# Patient Record
Sex: MALE | Race: BLACK OR AFRICAN AMERICAN | Employment: UNEMPLOYED | URBAN - METROPOLITAN AREA
[De-identification: names, ages, dates, MRNs, and addresses within clinical notes are randomized per-mention and may not be internally consistent; named-entity substitution may affect disease eponyms.]

---

## 2020-04-26 ENCOUNTER — APPOINTMENT (OUTPATIENT)
Dept: GENERAL RADIOLOGY | Age: 45
DRG: 053 | End: 2020-04-26
Payer: MEDICAID

## 2020-04-26 ENCOUNTER — APPOINTMENT (OUTPATIENT)
Dept: CT IMAGING | Age: 45
DRG: 053 | End: 2020-04-26
Payer: MEDICAID

## 2020-04-26 ENCOUNTER — HOSPITAL ENCOUNTER (INPATIENT)
Age: 45
LOS: 2 days | Discharge: HOME | DRG: 053 | End: 2020-04-28
Attending: EMERGENCY MEDICINE | Admitting: FAMILY MEDICINE
Payer: MEDICAID

## 2020-04-26 PROBLEM — R56.9 SEIZURE-LIKE ACTIVITY (HCC): Status: ACTIVE | Noted: 2020-04-26

## 2020-04-26 LAB
A/G RATIO: 1.1 (ref 1.1–2.2)
ACETAMINOPHEN LEVEL: <5 UG/ML (ref 10–30)
ALBUMIN SERPL-MCNC: 4.7 G/DL (ref 3.4–5)
ALP BLD-CCNC: 84 U/L (ref 40–129)
ALT SERPL-CCNC: 80 U/L (ref 10–40)
AMPHETAMINE SCREEN, URINE: NORMAL
ANION GAP SERPL CALCULATED.3IONS-SCNC: 33 MMOL/L (ref 3–16)
AST SERPL-CCNC: 104 U/L (ref 15–37)
BARBITURATE SCREEN URINE: NORMAL
BASE EXCESS VENOUS: -2.9 MMOL/L (ref -3–3)
BASOPHILS ABSOLUTE: 0.1 K/UL (ref 0–0.2)
BASOPHILS RELATIVE PERCENT: 1.7 %
BENZODIAZEPINE SCREEN, URINE: NORMAL
BETA-HYDROXYBUTYRATE: 0.1 MMOL/L (ref 0–0.27)
BILIRUB SERPL-MCNC: 0.5 MG/DL (ref 0–1)
BILIRUBIN URINE: NEGATIVE
BLOOD, URINE: ABNORMAL
BUN BLDV-MCNC: 12 MG/DL (ref 7–20)
CALCIUM SERPL-MCNC: 9.3 MG/DL (ref 8.3–10.6)
CANNABINOID SCREEN URINE: NORMAL
CARBOXYHEMOGLOBIN: 2.5 % (ref 0–1.5)
CHLORIDE BLD-SCNC: 89 MMOL/L (ref 99–110)
CLARITY: CLEAR
CO2: 12 MMOL/L (ref 21–32)
COCAINE METABOLITE SCREEN URINE: NORMAL
COLOR: YELLOW
CREAT SERPL-MCNC: 1 MG/DL (ref 0.9–1.3)
EOSINOPHILS ABSOLUTE: 0.1 K/UL (ref 0–0.6)
EOSINOPHILS RELATIVE PERCENT: 1 %
EPITHELIAL CELLS, UA: 0 /HPF (ref 0–5)
ETHANOL: NORMAL MG/DL (ref 0–0.08)
GFR AFRICAN AMERICAN: >60
GFR NON-AFRICAN AMERICAN: >60
GLOBULIN: 4.4 G/DL
GLUCOSE BLD-MCNC: 244 MG/DL (ref 70–99)
GLUCOSE BLD-MCNC: 77 MG/DL (ref 70–99)
GLUCOSE BLD-MCNC: 79 MG/DL (ref 70–99)
GLUCOSE URINE: 250 MG/DL
HCO3 VENOUS: 24.3 MMOL/L (ref 23–29)
HCT VFR BLD CALC: 43.1 % (ref 40.5–52.5)
HEMOGLOBIN, VEN, REDUCED: 13 %
HEMOGLOBIN: 14.3 G/DL (ref 13.5–17.5)
HYALINE CASTS: 1 /LPF (ref 0–8)
INR BLD: 1 (ref 0.86–1.14)
KETONES, URINE: NEGATIVE MG/DL
LACTIC ACID: 3.3 MMOL/L (ref 0.4–2)
LEUKOCYTE ESTERASE, URINE: NEGATIVE
LIPASE: 30 U/L (ref 13–60)
LYMPHOCYTES ABSOLUTE: 1.8 K/UL (ref 1–5.1)
LYMPHOCYTES RELATIVE PERCENT: 30.7 %
Lab: NORMAL
MCH RBC QN AUTO: 34.1 PG (ref 26–34)
MCHC RBC AUTO-ENTMCNC: 33.1 G/DL (ref 31–36)
MCV RBC AUTO: 103 FL (ref 80–100)
METHADONE SCREEN, URINE: NORMAL
METHEMOGLOBIN VENOUS: 0.5 %
MICROSCOPIC EXAMINATION: YES
MONOCYTES ABSOLUTE: 0.6 K/UL (ref 0–1.3)
MONOCYTES RELATIVE PERCENT: 10.2 %
NEUTROPHILS ABSOLUTE: 3.2 K/UL (ref 1.7–7.7)
NEUTROPHILS RELATIVE PERCENT: 56.4 %
NITRITE, URINE: NEGATIVE
O2 CONTENT, VEN: 16 VOL %
O2 SAT, VEN: 87 %
O2 THERAPY: ABNORMAL
OPIATE SCREEN URINE: NORMAL
OXYCODONE URINE: NORMAL
PCO2, VEN: 50.8 MMHG (ref 40–50)
PDW BLD-RTO: 12.9 % (ref 12.4–15.4)
PERFORMED ON: NORMAL
PERFORMED ON: NORMAL
PH UA: 6
PH UA: 6 (ref 5–8)
PH VENOUS: 7.29 (ref 7.35–7.45)
PHENCYCLIDINE SCREEN URINE: NORMAL
PLATELET # BLD: 149 K/UL (ref 135–450)
PMV BLD AUTO: 7.8 FL (ref 5–10.5)
PO2, VEN: 59.9 MMHG (ref 25–40)
POTASSIUM REFLEX MAGNESIUM: 3.6 MMOL/L (ref 3.5–5.1)
PROPOXYPHENE SCREEN: NORMAL
PROTEIN UA: 100 MG/DL
PROTHROMBIN TIME: 11.6 SEC (ref 10–13.2)
RBC # BLD: 4.18 M/UL (ref 4.2–5.9)
RBC UA: 1 /HPF (ref 0–4)
SALICYLATE, SERUM: <0.3 MG/DL (ref 15–30)
SODIUM BLD-SCNC: 134 MMOL/L (ref 136–145)
SPECIFIC GRAVITY UA: 1.02 (ref 1–1.03)
TCO2 CALC VENOUS: 58 MMOL/L
TOTAL PROTEIN: 9.1 G/DL (ref 6.4–8.2)
TROPONIN: <0.01 NG/ML
URINE REFLEX TO CULTURE: ABNORMAL
URINE TYPE: ABNORMAL
UROBILINOGEN, URINE: 0.2 E.U./DL
WBC # BLD: 5.7 K/UL (ref 4–11)
WBC UA: 0 /HPF (ref 0–5)

## 2020-04-26 PROCEDURE — G0480 DRUG TEST DEF 1-7 CLASSES: HCPCS

## 2020-04-26 PROCEDURE — 2100000000 HC CCU R&B

## 2020-04-26 PROCEDURE — 96365 THER/PROPH/DIAG IV INF INIT: CPT

## 2020-04-26 PROCEDURE — 83036 HEMOGLOBIN GLYCOSYLATED A1C: CPT

## 2020-04-26 PROCEDURE — 80053 COMPREHEN METABOLIC PANEL: CPT

## 2020-04-26 PROCEDURE — 82803 BLOOD GASES ANY COMBINATION: CPT

## 2020-04-26 PROCEDURE — 80307 DRUG TEST PRSMV CHEM ANLYZR: CPT

## 2020-04-26 PROCEDURE — 6360000002 HC RX W HCPCS: Performed by: FAMILY MEDICINE

## 2020-04-26 PROCEDURE — 93005 ELECTROCARDIOGRAM TRACING: CPT

## 2020-04-26 PROCEDURE — 99285 EMERGENCY DEPT VISIT HI MDM: CPT

## 2020-04-26 PROCEDURE — 85610 PROTHROMBIN TIME: CPT

## 2020-04-26 PROCEDURE — 6370000000 HC RX 637 (ALT 250 FOR IP): Performed by: FAMILY MEDICINE

## 2020-04-26 PROCEDURE — 6360000002 HC RX W HCPCS: Performed by: EMERGENCY MEDICINE

## 2020-04-26 PROCEDURE — 71045 X-RAY EXAM CHEST 1 VIEW: CPT

## 2020-04-26 PROCEDURE — 85025 COMPLETE CBC W/AUTO DIFF WBC: CPT

## 2020-04-26 PROCEDURE — 2500000003 HC RX 250 WO HCPCS: Performed by: EMERGENCY MEDICINE

## 2020-04-26 PROCEDURE — 83605 ASSAY OF LACTIC ACID: CPT

## 2020-04-26 PROCEDURE — 83690 ASSAY OF LIPASE: CPT

## 2020-04-26 PROCEDURE — 81001 URINALYSIS AUTO W/SCOPE: CPT

## 2020-04-26 PROCEDURE — 96375 TX/PRO/DX INJ NEW DRUG ADDON: CPT

## 2020-04-26 PROCEDURE — 74176 CT ABD & PELVIS W/O CONTRAST: CPT

## 2020-04-26 PROCEDURE — 84484 ASSAY OF TROPONIN QUANT: CPT

## 2020-04-26 PROCEDURE — 82010 KETONE BODYS QUAN: CPT

## 2020-04-26 PROCEDURE — 70450 CT HEAD/BRAIN W/O DYE: CPT

## 2020-04-26 PROCEDURE — 36415 COLL VENOUS BLD VENIPUNCTURE: CPT

## 2020-04-26 PROCEDURE — 2580000003 HC RX 258: Performed by: FAMILY MEDICINE

## 2020-04-26 PROCEDURE — 2580000003 HC RX 258: Performed by: EMERGENCY MEDICINE

## 2020-04-26 RX ORDER — ONDANSETRON 2 MG/ML
INJECTION INTRAMUSCULAR; INTRAVENOUS
Status: DISPENSED
Start: 2020-04-26 | End: 2020-04-27

## 2020-04-26 RX ORDER — INSULIN LISPRO 100 [IU]/ML
0-12 INJECTION, SOLUTION INTRAVENOUS; SUBCUTANEOUS
Status: DISCONTINUED | OUTPATIENT
Start: 2020-04-27 | End: 2020-04-28 | Stop reason: HOSPADM

## 2020-04-26 RX ORDER — LEVETIRACETAM 5 MG/ML
500 INJECTION INTRAVASCULAR EVERY 12 HOURS
Status: DISCONTINUED | OUTPATIENT
Start: 2020-04-26 | End: 2020-04-28 | Stop reason: HOSPADM

## 2020-04-26 RX ORDER — LORAZEPAM 2 MG/ML
1 INJECTION INTRAMUSCULAR ONCE
Status: COMPLETED | OUTPATIENT
Start: 2020-04-26 | End: 2020-04-26

## 2020-04-26 RX ORDER — ONDANSETRON 2 MG/ML
4 INJECTION INTRAMUSCULAR; INTRAVENOUS ONCE
Status: COMPLETED | OUTPATIENT
Start: 2020-04-26 | End: 2020-04-26

## 2020-04-26 RX ORDER — NICOTINE POLACRILEX 4 MG
15 LOZENGE BUCCAL PRN
Status: DISCONTINUED | OUTPATIENT
Start: 2020-04-26 | End: 2020-04-28 | Stop reason: HOSPADM

## 2020-04-26 RX ORDER — INSULIN LISPRO 100 [IU]/ML
0-6 INJECTION, SOLUTION INTRAVENOUS; SUBCUTANEOUS NIGHTLY
Status: DISCONTINUED | OUTPATIENT
Start: 2020-04-26 | End: 2020-04-28 | Stop reason: HOSPADM

## 2020-04-26 RX ORDER — DEXTROSE MONOHYDRATE 25 G/50ML
12.5 INJECTION, SOLUTION INTRAVENOUS PRN
Status: DISCONTINUED | OUTPATIENT
Start: 2020-04-26 | End: 2020-04-28 | Stop reason: HOSPADM

## 2020-04-26 RX ORDER — POLYETHYLENE GLYCOL 3350 17 G/17G
17 POWDER, FOR SOLUTION ORAL DAILY PRN
Status: DISCONTINUED | OUTPATIENT
Start: 2020-04-26 | End: 2020-04-28 | Stop reason: HOSPADM

## 2020-04-26 RX ORDER — SODIUM CHLORIDE 0.9 % (FLUSH) 0.9 %
10 SYRINGE (ML) INJECTION PRN
Status: DISCONTINUED | OUTPATIENT
Start: 2020-04-26 | End: 2020-04-28 | Stop reason: HOSPADM

## 2020-04-26 RX ORDER — 0.9 % SODIUM CHLORIDE 0.9 %
1000 INTRAVENOUS SOLUTION INTRAVENOUS ONCE
Status: COMPLETED | OUTPATIENT
Start: 2020-04-26 | End: 2020-04-26

## 2020-04-26 RX ORDER — AMLODIPINE BESYLATE 5 MG/1
10 TABLET ORAL NIGHTLY
Status: DISCONTINUED | OUTPATIENT
Start: 2020-04-26 | End: 2020-04-28 | Stop reason: HOSPADM

## 2020-04-26 RX ORDER — PROMETHAZINE HYDROCHLORIDE 25 MG/1
12.5 TABLET ORAL EVERY 6 HOURS PRN
Status: DISCONTINUED | OUTPATIENT
Start: 2020-04-26 | End: 2020-04-28 | Stop reason: HOSPADM

## 2020-04-26 RX ORDER — DEXTROSE MONOHYDRATE 50 MG/ML
100 INJECTION, SOLUTION INTRAVENOUS PRN
Status: DISCONTINUED | OUTPATIENT
Start: 2020-04-26 | End: 2020-04-28 | Stop reason: HOSPADM

## 2020-04-26 RX ORDER — HYDRALAZINE HYDROCHLORIDE 20 MG/ML
10 INJECTION INTRAMUSCULAR; INTRAVENOUS EVERY 4 HOURS PRN
Status: DISCONTINUED | OUTPATIENT
Start: 2020-04-26 | End: 2020-04-28 | Stop reason: HOSPADM

## 2020-04-26 RX ORDER — ACETAMINOPHEN 325 MG/1
650 TABLET ORAL EVERY 6 HOURS PRN
Status: DISCONTINUED | OUTPATIENT
Start: 2020-04-26 | End: 2020-04-28 | Stop reason: HOSPADM

## 2020-04-26 RX ORDER — LORAZEPAM 2 MG/ML
2 INJECTION INTRAMUSCULAR
Status: DISCONTINUED | OUTPATIENT
Start: 2020-04-26 | End: 2020-04-28 | Stop reason: HOSPADM

## 2020-04-26 RX ORDER — ONDANSETRON 2 MG/ML
4 INJECTION INTRAMUSCULAR; INTRAVENOUS EVERY 6 HOURS PRN
Status: DISCONTINUED | OUTPATIENT
Start: 2020-04-26 | End: 2020-04-28 | Stop reason: HOSPADM

## 2020-04-26 RX ORDER — ACETAMINOPHEN 650 MG/1
650 SUPPOSITORY RECTAL EVERY 6 HOURS PRN
Status: DISCONTINUED | OUTPATIENT
Start: 2020-04-26 | End: 2020-04-28 | Stop reason: HOSPADM

## 2020-04-26 RX ORDER — LORAZEPAM 2 MG/ML
3 INJECTION INTRAMUSCULAR
Status: DISCONTINUED | OUTPATIENT
Start: 2020-04-26 | End: 2020-04-28 | Stop reason: HOSPADM

## 2020-04-26 RX ORDER — LORAZEPAM 1 MG/1
4 TABLET ORAL
Status: DISCONTINUED | OUTPATIENT
Start: 2020-04-26 | End: 2020-04-28 | Stop reason: HOSPADM

## 2020-04-26 RX ORDER — SODIUM CHLORIDE 0.9 % (FLUSH) 0.9 %
10 SYRINGE (ML) INJECTION EVERY 12 HOURS SCHEDULED
Status: DISCONTINUED | OUTPATIENT
Start: 2020-04-26 | End: 2020-04-28 | Stop reason: HOSPADM

## 2020-04-26 RX ORDER — LORAZEPAM 2 MG/ML
4 INJECTION INTRAMUSCULAR
Status: DISCONTINUED | OUTPATIENT
Start: 2020-04-26 | End: 2020-04-28 | Stop reason: HOSPADM

## 2020-04-26 RX ORDER — LORAZEPAM 1 MG/1
3 TABLET ORAL
Status: DISCONTINUED | OUTPATIENT
Start: 2020-04-26 | End: 2020-04-28 | Stop reason: HOSPADM

## 2020-04-26 RX ORDER — LORAZEPAM 2 MG/ML
1 INJECTION INTRAMUSCULAR
Status: DISCONTINUED | OUTPATIENT
Start: 2020-04-26 | End: 2020-04-28 | Stop reason: HOSPADM

## 2020-04-26 RX ORDER — LORAZEPAM 1 MG/1
1 TABLET ORAL
Status: DISCONTINUED | OUTPATIENT
Start: 2020-04-26 | End: 2020-04-28 | Stop reason: HOSPADM

## 2020-04-26 RX ORDER — LORAZEPAM 1 MG/1
2 TABLET ORAL
Status: DISCONTINUED | OUTPATIENT
Start: 2020-04-26 | End: 2020-04-28 | Stop reason: HOSPADM

## 2020-04-26 RX ADMIN — FOLIC ACID 1000 ML/HR: 5 INJECTION, SOLUTION INTRAMUSCULAR; INTRAVENOUS; SUBCUTANEOUS at 17:07

## 2020-04-26 RX ADMIN — Medication 10 ML: at 21:50

## 2020-04-26 RX ADMIN — AMLODIPINE BESYLATE 10 MG: 5 TABLET ORAL at 21:20

## 2020-04-26 RX ADMIN — LEVETIRACETAM 500 MG: 5 INJECTION INTRAVENOUS at 21:21

## 2020-04-26 RX ADMIN — SODIUM CHLORIDE 1000 ML: 9 INJECTION, SOLUTION INTRAVENOUS at 15:37

## 2020-04-26 RX ADMIN — ENOXAPARIN SODIUM 40 MG: 40 INJECTION SUBCUTANEOUS at 21:44

## 2020-04-26 RX ADMIN — LORAZEPAM 1 MG: 2 INJECTION INTRAMUSCULAR; INTRAVENOUS at 16:18

## 2020-04-26 RX ADMIN — ONDANSETRON 4 MG: 2 INJECTION INTRAMUSCULAR; INTRAVENOUS at 16:17

## 2020-04-26 ASSESSMENT — PAIN SCALES - GENERAL
PAINLEVEL_OUTOF10: 0
PAINLEVEL_OUTOF10: 0

## 2020-04-26 NOTE — ED PROVIDER NOTES
[facility] Emergency Department      Pt Name: Lois Bruner  MRN: [id number]  [place] 1975  Date of evaluation: 4/26/2020  Provider: Westley Gallardo MD  CHIEF COMPLAINT  Chief Complaint   Patient presents with    Seizures     Pt brought in per [address] [place] [address], per report family reports possible seizure, pt was shaking and not responsive for short period of time. Pt arrives alert and oriented to person and place only. HPI  Lois Bruner is a 38 y.o. male who presents because of a seizure. A friend of his called EMS because he was unresponsive and exhibited shaking at home. Is unclear how long it occurred. The patient was confused on EMS arrival.  He reports not ever having a seizure before. He does occasionally use alcohol. He denies drug use. He is initially a very poor historian and confused. He admits to abdominal pain. REVIEW OF SYSTEMS:  No fever, no new medication, no tongue injury, no chest pain, incontinence negative Pertinent positives and negatives as per the HPI. All other review of systems reviewed and negative. Nursing notes reviewed. PAST MEDICAL HISTORY  History reviewed. No pertinent past medical history. SURGICAL HISTORY  History reviewed. No pertinent surgical history. MEDICATIONS:  No current facility-administered medications on file prior to encounter. No current outpatient medications on file prior to encounter. ALLERGIES  Patient has no known allergies. FAMILY HISTORY:  History reviewed. No pertinent family history. SOCIAL HISTORY  Social History     Tobacco Use    Smoking status: Current Every Day Smoker    Smokeless tobacco: Never Used   Substance Use Topics    Alcohol use: Yes     Comment: socially    Drug use: Never     IMMUNIZATIONS  Noncontributory    PHYSICAL EXAM  VITAL SIGNS:  Blood pressure (!) 152/92, pulse 97, temperature 99 °F (37.2 °C), temperature source Oral, resp. rate 23, SpO2 96 %.   Constitutional:  38 y.o. male alert, cooperative but appears confused  HENT:  Atraumatic, mucous membranes moist, tongue without bruising  Eyes:   Conjunctiva clear, no icterus  Neck:  Supple, no adenopathy, no JVD, no meningeal signs  Cardiovascular:  Regular, no rubs, no discernible murmur  Thorax & Lungs:  No accessory muscle usage, clear  Abdomen:  Soft, bowel sounds present, no tenderness  Back:  No deformity  Genitalia:  Deferred  Rectal:  Deferred  Extremities:  No cyanosis, no edema  Skin:  Warm, dry  Neurologic:  Alert & oriented, no slurred speech, CN function intact, PERRL, coordination of extremities symmetric, tremor of both hands and tongue  Psychiatric:  Affect appropriate    DIAGNOSTIC RESULTS:  Labs resulted at the time of this note reviewed.   Labs Reviewed   CBC WITH AUTO DIFFERENTIAL - Abnormal; Notable for the following components:       Result Value    RBC 4.18 (*)     .0 (*)     MCH 34.1 (*)     All other components within normal limits    Narrative:     Performed at:  [facility] [address], [place], [address] [facility]   Phone [phone]   COMPREHENSIVE METABOLIC PANEL W/ REFLEX TO MG FOR LOW K - Abnormal; Notable for the following components:    Sodium 134 (*)     Chloride 89 (*)     CO2 12 (*)     Anion Gap 33 (*)     Glucose 244 (*)     Total Protein 9.1 (*)     ALT 80 (*)      (*)     All other components within normal limits    Narrative:     Farzaneh Friedman  [facility] tel. [phone],  Chemistry results called to and read back by Narendra Bowden, 04/26/2020  16:19, by [facility]  Performed at:  [facility] [address], [place], [address]   Phone [phone]   URINE RT REFLEX TO CULTURE - Abnormal; Notable for the following components:    Glucose, Ur 250 (*)     Blood, Urine TRACE (*)     Protein,  (*)     All other components within normal limits    Narrative:     Performed at:  [facility] [address], [place], [place] [address]   Phone [phone]   BLOOD GAS, VENOUS - Abnormal; Notable for the following components:    pH, Yoan 7.288 (*)     pCO2, Yoan 50.8 (*)     pO2, Yoan 59.9 (*)     Carboxyhemoglobin 2.5 (*)     All other components within normal limits    Narrative:     Performed at:  [facility] [address] [place], [address]   Phone [phone]   ACETAMINOPHEN LEVEL - Abnormal; Notable for the following components:    Acetaminophen Level <5 (*)     All other components within normal limits    Narrative:     Performed at:  [facility] [address] [facility] [facility] [address]  [place], [address] [facility]   Phone [phone]   SALICYLATE LEVEL - Abnormal; Notable for the following components:    Salicylate, Serum <0.1 (*)     All other components within normal limits    Narrative:     Performed at:  [facility] [address] [place],  [place], [address]   Phone [phone]   LACTIC ACID, PLASMA - Abnormal; Notable for the following components:    Lactic Acid 3.3 (*)     All other components within normal limits    Narrative:     Performed at:  [facility] [address] [place],  [place], [address] [employer]   Phone [phone]   TROPONIN    Narrative:     Roxanna Davila  [facility] tel. [phone],  Chemistry results called to and read back by Osman Huang, 04/26/2020  16:19, by [facility]  Performed at:  [facility] [address] [place],  [place], [address] [employer]   Phone [phone]   PROTIME-INR    Narrative:     Performed at:  [facility] [address] [place],  [place], [address]   Phone [phone]   URINE DRUG SCREEN    Narrative:     Performed at:  [facility] [address] [facility] [place], [address] [facility]   Phone [phone]   ETHANOL    Narrative:     oRxanna Davila  [facility] tel. [phone],  Chemistry results called to and read back by Osman Huang,

## 2020-04-26 NOTE — ED NOTES
Dr. Rima Valentino notified of CO2 12. Pt alert and oriented times 4. Confused at times, but easily redirectable. No seizure activity noted.       Charly Phillips RN  04/26/20 3022

## 2020-04-26 NOTE — ED NOTES
Large amount of undigested emesis at this time.  Dr. Sheela Mosqueda notified     Niki Dubon RN  04/26/20 1615

## 2020-04-27 ENCOUNTER — APPOINTMENT (OUTPATIENT)
Dept: MRI IMAGING | Age: 45
DRG: 053 | End: 2020-04-27
Payer: MEDICAID

## 2020-04-27 LAB
A/G RATIO: 1.1 (ref 1.1–2.2)
ALBUMIN SERPL-MCNC: 4 G/DL (ref 3.4–5)
ALP BLD-CCNC: 49 U/L (ref 40–129)
ALT SERPL-CCNC: 62 U/L (ref 10–40)
ANION GAP SERPL CALCULATED.3IONS-SCNC: 12 MMOL/L (ref 3–16)
AST SERPL-CCNC: 74 U/L (ref 15–37)
BASE EXCESS VENOUS: 2.7 MMOL/L (ref -3–3)
BASOPHILS ABSOLUTE: 0.1 K/UL (ref 0–0.2)
BASOPHILS RELATIVE PERCENT: 1.4 %
BILIRUB SERPL-MCNC: 1 MG/DL (ref 0–1)
BUN BLDV-MCNC: 6 MG/DL (ref 7–20)
CALCIUM SERPL-MCNC: 8.8 MG/DL (ref 8.3–10.6)
CARBOXYHEMOGLOBIN: 1.2 % (ref 0–1.5)
CHLORIDE BLD-SCNC: 98 MMOL/L (ref 99–110)
CO2: 26 MMOL/L (ref 21–32)
CREAT SERPL-MCNC: 0.7 MG/DL (ref 0.9–1.3)
EKG ATRIAL RATE: 94 BPM
EKG DIAGNOSIS: NORMAL
EKG P AXIS: 66 DEGREES
EKG P-R INTERVAL: 154 MS
EKG Q-T INTERVAL: 352 MS
EKG QRS DURATION: 92 MS
EKG QTC CALCULATION (BAZETT): 440 MS
EKG R AXIS: 70 DEGREES
EKG T AXIS: 32 DEGREES
EKG VENTRICULAR RATE: 94 BPM
EOSINOPHILS ABSOLUTE: 0.1 K/UL (ref 0–0.6)
EOSINOPHILS RELATIVE PERCENT: 3.6 %
ESTIMATED AVERAGE GLUCOSE: 111.2 MG/DL
GFR AFRICAN AMERICAN: >60
GFR NON-AFRICAN AMERICAN: >60
GLOBULIN: 3.8 G/DL
GLUCOSE BLD-MCNC: 109 MG/DL (ref 70–99)
GLUCOSE BLD-MCNC: 91 MG/DL (ref 70–99)
HBA1C MFR BLD: 5.5 %
HCO3 VENOUS: 27.1 MMOL/L (ref 23–29)
HCT VFR BLD CALC: 39.2 % (ref 40.5–52.5)
HEMOGLOBIN: 13.6 G/DL (ref 13.5–17.5)
LACTIC ACID: 1.1 MMOL/L (ref 0.4–2)
LYMPHOCYTES ABSOLUTE: 0.6 K/UL (ref 1–5.1)
LYMPHOCYTES RELATIVE PERCENT: 14.9 %
MAGNESIUM: 2.1 MG/DL (ref 1.8–2.4)
MCH RBC QN AUTO: 34 PG (ref 26–34)
MCHC RBC AUTO-ENTMCNC: 34.7 G/DL (ref 31–36)
MCV RBC AUTO: 98 FL (ref 80–100)
METHEMOGLOBIN VENOUS: 0.5 %
MONOCYTES ABSOLUTE: 0.6 K/UL (ref 0–1.3)
MONOCYTES RELATIVE PERCENT: 14.1 %
NEUTROPHILS ABSOLUTE: 2.6 K/UL (ref 1.7–7.7)
NEUTROPHILS RELATIVE PERCENT: 66 %
O2 CONTENT, VEN: 19 VOL %
O2 SAT, VEN: 99 %
O2 THERAPY: ABNORMAL
PCO2, VEN: 40.1 MMHG (ref 40–50)
PDW BLD-RTO: 12.9 % (ref 12.4–15.4)
PERFORMED ON: NORMAL
PH VENOUS: 7.44 (ref 7.35–7.45)
PLATELET # BLD: 113 K/UL (ref 135–450)
PMV BLD AUTO: 7.5 FL (ref 5–10.5)
PO2, VEN: 99.4 MMHG (ref 25–40)
POTASSIUM REFLEX MAGNESIUM: 3.5 MMOL/L (ref 3.5–5.1)
RBC # BLD: 4 M/UL (ref 4.2–5.9)
SODIUM BLD-SCNC: 136 MMOL/L (ref 136–145)
TCO2 CALC VENOUS: 64 MMOL/L
TOTAL PROTEIN: 7.8 G/DL (ref 6.4–8.2)
WBC # BLD: 3.9 K/UL (ref 4–11)

## 2020-04-27 PROCEDURE — 6360000002 HC RX W HCPCS: Performed by: FAMILY MEDICINE

## 2020-04-27 PROCEDURE — 93010 ELECTROCARDIOGRAM REPORT: CPT | Performed by: INTERNAL MEDICINE

## 2020-04-27 PROCEDURE — 85025 COMPLETE CBC W/AUTO DIFF WBC: CPT

## 2020-04-27 PROCEDURE — 95819 EEG AWAKE AND ASLEEP: CPT

## 2020-04-27 PROCEDURE — 95819 EEG AWAKE AND ASLEEP: CPT | Performed by: PSYCHIATRY & NEUROLOGY

## 2020-04-27 PROCEDURE — 2580000003 HC RX 258: Performed by: FAMILY MEDICINE

## 2020-04-27 PROCEDURE — 94760 N-INVAS EAR/PLS OXIMETRY 1: CPT

## 2020-04-27 PROCEDURE — 80053 COMPREHEN METABOLIC PANEL: CPT

## 2020-04-27 PROCEDURE — 6370000000 HC RX 637 (ALT 250 FOR IP): Performed by: FAMILY MEDICINE

## 2020-04-27 PROCEDURE — 70551 MRI BRAIN STEM W/O DYE: CPT

## 2020-04-27 PROCEDURE — 82803 BLOOD GASES ANY COMBINATION: CPT

## 2020-04-27 PROCEDURE — 83605 ASSAY OF LACTIC ACID: CPT

## 2020-04-27 PROCEDURE — 83735 ASSAY OF MAGNESIUM: CPT

## 2020-04-27 PROCEDURE — 2060000000 HC ICU INTERMEDIATE R&B

## 2020-04-27 PROCEDURE — 36415 COLL VENOUS BLD VENIPUNCTURE: CPT

## 2020-04-27 RX ADMIN — LEVETIRACETAM 500 MG: 5 INJECTION INTRAVENOUS at 23:55

## 2020-04-27 RX ADMIN — LEVETIRACETAM 500 MG: 5 INJECTION INTRAVENOUS at 09:29

## 2020-04-27 RX ADMIN — Medication 10 ML: at 23:42

## 2020-04-27 RX ADMIN — Medication 10 ML: at 09:29

## 2020-04-27 RX ADMIN — ENOXAPARIN SODIUM 40 MG: 40 INJECTION SUBCUTANEOUS at 09:29

## 2020-04-27 RX ADMIN — AMLODIPINE BESYLATE 10 MG: 5 TABLET ORAL at 23:42

## 2020-04-27 ASSESSMENT — PAIN SCALES - GENERAL
PAINLEVEL_OUTOF10: 0

## 2020-04-27 NOTE — H&P
HOSPITALISTS HISTORY AND PHYSICAL    4/26/2020 10:08 PM    Patient Information:  Juan Luis Crocker is a 38 y.o. male [id number]  PCP:  No primary care provider on file. (Tel: None )    Chief complaint:    Chief Complaint   Patient presents with    Seizures     Pt brought in per [facility], per report family reports possible seizure, pt was shaking and not responsive for short period of time. Pt arrives alert and oriented to person and place only. History of Present Illness:  Zulema Kessler is a 38 y.o. male  Is brought in due to concern for seizure like activity. The pt Is alert here but is somewhat slow in responding. He states he felt very weak this morning and was not able to get out of bed. Denies toungue or lip biting. He denies family or personal h/o seizurre. Denies drug use. Drink couple of beers on the weekend. Last drink was last night. Smokes cigarettes. Urine drug screen is neg for illicit drugs  NIHSScore 0 . CT head is neg for acute findings. REVIEW OF SYSTEMS:   Constitutional: Negative for fever,chills or night sweats  ENT: Negative for rhinorrhea, epistaxis, hoarseness, sore throat. Respiratory: Negative for shortness of breath,wheezing  Cardiovascular: Negative for chest pain, palpitations   Gastrointestinal: Negative for nausea, vomiting, diarrhea  Genitourinary: Negative for polyuria, dysuria   Hematologic/Lymphatic: Negative for bleeding tendency, easy bruising  Musculoskeletal: Negative for myalgias and arthralgias  Neurologic: Negative for confusion,dysarthria. Skin: Negative for itching,rash  Psychiatric: Negative for depression,anxiety, agitation. Endocrine: Negative for polydipsia,polyuria,heat /cold intolerance. Past Medical History:   has no past medical history on file. Past Surgical History:   has no past surgical history on file.      Medications:  No current facility-administered medications on file MD        enoxaparin (LOVENOX) injection 40 mg  40 mg Subcutaneous Daily Sharita Lopez MD   40 mg at 04/26/20 2144    glucose (GLUTOSE) 40 % oral gel 15 g  15 g Oral PRN Ivana Foley MD        dextrose 50 % IV solution  12.5 g Intravenous PRN Ivana Foley MD        glucagon (rDNA) injection 1 mg  1 mg Intramuscular PRN Ivana Foley MD        dextrose 5 % solution  100 mL/hr Intravenous PRN Ivana Foley MD       Chelsea Gerard Denise Relic ON 4/27/2020] insulin lispro (1 Unit Dial) 0-12 Units  0-12 Units Subcutaneous TID WC Sharita Wilkins MD        insulin lispro (1 Unit Dial) 0-6 Units  0-6 Units Subcutaneous Nightly Ivana Foley MD        ondansetron [facility]) 4 MG/2ML injection             hydrALAZINE (APRESOLINE) injection 10 mg  10 mg Intravenous Q4H PRN Ivana Foley MD        amLODIPine (NORVASC) tablet 10 mg  10 mg Oral Nightly Ivana Foley MD   10 mg at 04/26/20 2120     Allergies:  No Known Allergies     Social History:   reports that he has been smoking. He has never used smokeless tobacco. He reports current alcohol use of about 2.0 standard drinks of alcohol per week. He reports that he does not use drugs. Family History:  family history is not on file. ,     Physical Exam:  BP (!) 163/101   Pulse 95   Temp 98.1 °F (36.7 °C) (Temporal)   Resp 20   Wt 150 lb 9.2 oz (68.3 kg)   SpO2 99%   BMI 26.67 kg/m²     General appearance:  Appears comfortable. Well nourished  Eyes: Sclera clear, pupils equal  ENT: Moist mucus membranes, no thrush. Trachea midline. Cardiovascular: Regular rhythm, normal S1, S2. No murmur, gallop, rub. No edema in lower extremities  Respiratory: Clear to auscultation bilaterally, no wheeze, good inspiratory effort  Gastrointestinal: Abdomen soft, non-tender, not distended, normal bowel sounds  Musculoskeletal: No cyanosis in digits, neck supple  Neurology: Cranial nerves grossly intact. Alert and oriented in time, place and person.  No speech or motor deficits  Psychiatry:

## 2020-04-28 VITALS
BODY MASS INDEX: 23.63 KG/M2 | OXYGEN SATURATION: 100 % | RESPIRATION RATE: 16 BRPM | DIASTOLIC BLOOD PRESSURE: 93 MMHG | SYSTOLIC BLOOD PRESSURE: 131 MMHG | TEMPERATURE: 98 F | WEIGHT: 150.57 LBS | HEART RATE: 81 BPM | HEIGHT: 67 IN

## 2020-04-28 LAB
GLUCOSE BLD-MCNC: 91 MG/DL (ref 70–99)
GLUCOSE BLD-MCNC: 99 MG/DL (ref 70–99)
PERFORMED ON: NORMAL
PERFORMED ON: NORMAL

## 2020-04-28 PROCEDURE — 6360000002 HC RX W HCPCS: Performed by: FAMILY MEDICINE

## 2020-04-28 PROCEDURE — 2580000003 HC RX 258: Performed by: FAMILY MEDICINE

## 2020-04-28 PROCEDURE — 99254 IP/OBS CNSLTJ NEW/EST MOD 60: CPT | Performed by: PSYCHIATRY & NEUROLOGY

## 2020-04-28 RX ORDER — AMLODIPINE BESYLATE 10 MG/1
10 TABLET ORAL NIGHTLY
Qty: 30 TABLET | Refills: 3 | Status: SHIPPED | OUTPATIENT
Start: 2020-04-28 | End: 2020-04-28 | Stop reason: HOSPADM

## 2020-04-28 RX ORDER — AMLODIPINE BESYLATE 10 MG/1
10 TABLET ORAL DAILY
Qty: 30 TABLET | Refills: 3 | Status: ON HOLD | OUTPATIENT
Start: 2020-04-28 | End: 2020-06-12 | Stop reason: SDUPTHER

## 2020-04-28 RX ORDER — AMLODIPINE BESYLATE 10 MG/1
10 TABLET ORAL NIGHTLY
Qty: 30 TABLET | Refills: 3 | Status: SHIPPED | OUTPATIENT
Start: 2020-04-28 | End: 2020-04-28

## 2020-04-28 RX ADMIN — ENOXAPARIN SODIUM 40 MG: 40 INJECTION SUBCUTANEOUS at 08:35

## 2020-04-28 RX ADMIN — Medication 10 ML: at 08:35

## 2020-04-28 ASSESSMENT — PAIN SCALES - GENERAL
PAINLEVEL_OUTOF10: 0
PAINLEVEL_OUTOF10: 0

## 2020-04-28 NOTE — DISCHARGE SUMMARY
Discharge Medication List        Current Discharge Medication List          Labs: For convenience and continuity at follow-up the following most recent labs are provided:    Lab Results   Component Value Date    WBC 3.9 04/27/2020    HGB 13.6 04/27/2020    HCT 39.2 04/27/2020    MCV 98.0 04/27/2020     04/27/2020     04/27/2020    K 3.5 04/27/2020    CL 98 04/27/2020    CO2 26 04/27/2020    BUN 6 04/27/2020    CREATININE 0.7 04/27/2020    CALCIUM 8.8 04/27/2020    ALKPHOS 49 04/27/2020    ALT 62 04/27/2020    AST 74 04/27/2020    BILITOT 1.0 04/27/2020    LABALBU 4.0 04/27/2020     Lab Results   Component Value Date    INR 1.00 04/26/2020       Radiology:  Ct Abdomen Pelvis Wo Contrast Additional Contrast? None    Result Date: 4/26/2020  EXAMINATION: CT OF THE ABDOMEN AND PELVIS WITHOUT CONTRAST 4/26/2020 4:06 pm TECHNIQUE: CT of the abdomen and pelvis was performed without the administration of intravenous contrast. Multiplanar reformatted images are provided for review. Dose modulation, iterative reconstruction, and/or weight based adjustment of the mA/kV was utilized to reduce the radiation dose to as low as reasonably achievable. COMPARISON: None. HISTORY: ORDERING SYSTEM PROVIDED HISTORY: vomiting, ams TECHNOLOGIST PROVIDED HISTORY: Additional Contrast?->None Reason for exam:->vomiting, ams Reason for Exam: AMS vomiting Acuity: Acute Type of Exam: Initial Additional signs and symptoms: seizure FINDINGS: Lower Chest: Limited images through the lung bases are unremarkable. Organs: Lack of intravenous contrast limits evaluation of the solid organs, vascular structures, and bowel. The liver and gallbladder are unremarkable. No biliary ductal dilatation is identified. The pancreas, spleen, and bilateral adrenal glands are unremarkable. 13 mm cyst in the left kidney. The right kidney is normal in appearance. No obstructive uropathy or urinary collecting system calculi. GI/Bowel: Normal appendix.   The is mild mucoperiosteal thickening in the maxillary sinuses, ethmoid air cells and sphenoid sinus. There is mild mucoperiosteal thickening in the frontal sinuses. The mastoid air cells are clear. BONES/SOFT TISSUES: The bone marrow signal intensity appears normal. The soft tissues demonstrate no acute abnormality. High-signal change in the periventricular white matter that are nonspecific. Demyelination could be considered in the appropriate clinical setting given the patient's young age. Chronic small vessel ischemic disease, Lyme disease, vasculitis, or migraines can cause a similar finding. No acute brain parenchymal abnormality.          Signed:    Warden Haley MD   4/28/2020

## 2020-04-28 NOTE — CONSULTS
per day Patrica Miller MD   10 mL at 04/28/20 0835    sodium chloride flush 0.9 % injection 10 mL  10 mL Intravenous PRN Patrica Miller MD        acetaminophen (TYLENOL) tablet 650 mg  650 mg Oral Q6H PRN Patrica Miller MD        Or   Ailin Rod acetaminophen (TYLENOL) suppository 650 mg  650 mg Rectal Q6H PRN Patrica Miller MD        polyethylene glycol (GLYCOLAX) packet 17 g  17 g Oral Daily PRN Patrica Miller MD        promethazine (PHENERGAN) tablet 12.5 mg  12.5 mg Oral Q6H PRN Patrica Miller MD        Or    ondansetron (ZOFRAN) injection 4 mg  4 mg Intravenous Q6H PRN Patrica Miller MD        enoxaparin (LOVENOX) injection 40 mg  40 mg Subcutaneous Daily Patrica Miller MD   40 mg at 04/28/20 0835    glucose (GLUTOSE) 40 % oral gel 15 g  15 g Oral PRN Patrica Miller MD        dextrose 50 % IV solution  12.5 g Intravenous PRN Patrica Miller MD        glucagon (rDNA) injection 1 mg  1 mg Intramuscular PRN Patrica Miller MD        dextrose 5 % solution  100 mL/hr Intravenous PRN Patrica Miller MD        insulin lispro (1 Unit Dial) 0-12 Units  0-12 Units Subcutaneous TID WC Patrica Miller MD        insulin lispro (1 Unit Dial) 0-6 Units  0-6 Units Subcutaneous Nightly Patrica Miller MD        hydrALAZINE (APRESOLINE) injection 10 mg  10 mg Intravenous Q4H PRN Patrica Miller MD        amLODIPine (NORVASC) tablet 10 mg  10 mg Oral Nightly Patrica Miller MD   10 mg at 04/27/20 2902     No Known Allergies    PHYSICAL EXAMINATION:  BP (!) 131/93   Pulse 81   Temp 98 °F (36.7 °C) (Oral)   Resp 16   Ht 5' 7\" (1.702 m)   Wt 150 lb 9.2 oz (68.3 kg)   SpO2 100%   BMI 23.58 kg/m²   Appearance: Well appearing, well nourished and in no distress  Mental Status Exam: Patient is alert, oriented to person, place and time.    Recent and remote memory is normal  Fund of Knowledge is normal  Attention/concentration is normal.   Speech : No dysarthria  Language : No aphasia  Funduscopic Exam: sharp disc margins  Cranial Nerves:   II:

## 2020-04-28 NOTE — PROGRESS NOTES
4 Eyes Skin Assessment     The patient is being assess for  Admission    I agree that 2 RN's have performed a thorough Head to Toe Skin Assessment on the patient. ALL assessment sites listed below have been assessed. Areas assessed by both nurses: patrick and arnav  [x]   Head, Face, and Ears   [x]   Shoulders, Back, and Chest  [x]   Arms, Elbows, and Hands   [x]   Coccyx, Sacrum, and IschIum  [x]   Legs, Feet, and Heels        Does the Patient have Skin Breakdown?   No         Luc Prevention initiated:  No   Wound Care Orders initiated:  No      [facility] nurse consulted for Pressure Injury (Stage 3,4, Unstageable, DTI, NWPT, and Complex wounds), New and Established Ostomies:  No      Nurse 1 eSignature: Electronically signed by Willy Keys RN on 4/27/20 at 6:49 AM EDT    **SHARE this note so that the co-signing nurse is able to place an eSignature**    Nurse 2 eSignature: Electronically signed by Isaak Donnelly RN on 4/27/20 at 6:57 AM EDT
Data- discharge order received, pt verbalized agreement to discharge, disposition to previous residence, no needs for HHC/DME. Action- discharge instructions prepared and given to Patient, pt verbalized understanding. Medication information packet given r/t NEW and/or CHANGED prescriptions emphasizing name/purpose/side effects, pt verbalized understanding. Discharge instruction summary: Diet- General, Activity- As tolerated,  Patient given a list of PCP's and I highlighted offices close to home. Paper prescription given. Response- Pt belongings gathered, IV removed. Disposition is home (no HHC/DME needs), transported with personal belonings, taken to lobby via w/c, no complications. Patient reminded that he can not drive for 3 months or until he is cleared by a family physician. PCP list was given. Highlighted close offices. Patient reminded that his prescription is only for three months and will need a new prescription by a PCP.
Pt transferred to  via w/c.
at 4/26/2020 1810  Gross per 24 hour   Intake 2000 ml   Output --   Net 2000 ml      Wt Readings from Last 3 Encounters:   04/27/20 150 lb 9.2 oz (68.3 kg)   07/06/18 160 lb (72.6 kg)       General appearance:  Appears comfortable. AAOx3  HEENT: atraumatic, Pupils equal, muscous membranes moist, no masses appreciated  Cardiovascular: Regular rate and rhythm no murmurs appreciated  Respiratory: CTAB no wheezing  Gastrointestinal: Abdomen soft, non-tender, BS+  EXT: no edema  Neurology: no gross focal deficts, strenght 5/5 bilaterally no dysmetria  Psychiatry: Appropriate affect. Not agitated  Skin: Warm, dry, no rashes appreciated    Labs and Tests:  CBC:   Recent Labs     04/26/20  1530   WBC 5.7   HGB 14.3        BMP:    Recent Labs     04/26/20  1530   *   K 3.6   CL 89*   CO2 12*   BUN 12   CREATININE 1.0   GLUCOSE 244*     Hepatic:   Recent Labs     04/26/20  1530   *   ALT 80*   BILITOT 0.5   ALKPHOS 84     CT ABDOMEN PELVIS WO CONTRAST Additional Contrast? None   Final Result   No acute abnormality in the abdomen or pelvis. CT Head WO Contrast   Final Result   No acute intracranial abnormality. XR CHEST PORTABLE   Final Result   No acute process. MRI BRAIN WO CONTRAST    (Results Pending)       Recent imaging reviewed    Problem List  Active Problems:    Seizure-like activity ([address]  Resolved Problems:    * No resolved hospital problems.  *       Assessment & Plan:   ?Seizure:  ?metabolic acidosis at time bicarb of 12 will repeat bmp and check, get vbg  - eeg and mri pending  - IV keppra  - neuro consult  - seziure precuations    HTN: started norvascs    Elevated lfts check cmp repeat     Diet: DIET CARB CONTROL;  Code:Full Code  DVT PPXlovenox  Disposition  Pending work up likely home after      Karla Rocha MD   4/27/2020 9:51 AM

## 2020-04-28 NOTE — PLAN OF CARE
Problem: Falls - Risk of:  Goal: Will remain free from falls  Description: Will remain free from falls  Outcome: Ongoing  Goal: Absence of physical injury  Description: Absence of physical injury  Outcome: Ongoing  Note: LOW fall risk. Pt is aware of needs, rings for assistance if needed. Nonskid socks on, callbell in reach.

## 2020-05-21 ENCOUNTER — HOSPITAL ENCOUNTER (INPATIENT)
Age: 45
LOS: 1 days | Discharge: HOME | DRG: 897 | End: 2020-05-22
Attending: EMERGENCY MEDICINE | Admitting: FAMILY MEDICINE
Payer: MEDICAID

## 2020-05-21 ENCOUNTER — APPOINTMENT (OUTPATIENT)
Dept: GENERAL RADIOLOGY | Age: 45
DRG: 897 | End: 2020-05-21

## 2020-05-21 PROBLEM — F10.239 ALCOHOL DEPENDENCE WITH WITHDRAWAL (HCC): Status: ACTIVE | Noted: 2020-05-21

## 2020-05-21 LAB
A/G RATIO: 1.2 (ref 1.1–2.2)
ALBUMIN SERPL-MCNC: 4.6 G/DL (ref 3.4–5)
ALP BLD-CCNC: 87 U/L (ref 40–129)
ALT SERPL-CCNC: 88 U/L (ref 10–40)
AMPHETAMINE SCREEN, URINE: NORMAL
ANION GAP SERPL CALCULATED.3IONS-SCNC: 18 MMOL/L (ref 3–16)
AST SERPL-CCNC: 108 U/L (ref 15–37)
BARBITURATE SCREEN URINE: NORMAL
BASE EXCESS VENOUS: 2.3 MMOL/L (ref -3–3)
BASOPHILS ABSOLUTE: 0.1 K/UL (ref 0–0.2)
BASOPHILS RELATIVE PERCENT: 1.3 %
BENZODIAZEPINE SCREEN, URINE: NORMAL
BILIRUB SERPL-MCNC: 0.7 MG/DL (ref 0–1)
BILIRUBIN URINE: NEGATIVE
BLOOD, URINE: NEGATIVE
BUN BLDV-MCNC: 12 MG/DL (ref 7–20)
CALCIUM SERPL-MCNC: 9.6 MG/DL (ref 8.3–10.6)
CANNABINOID SCREEN URINE: NORMAL
CARBOXYHEMOGLOBIN: 2.6 % (ref 0–1.5)
CHLORIDE BLD-SCNC: 90 MMOL/L (ref 99–110)
CLARITY: CLEAR
CO2: 23 MMOL/L (ref 21–32)
COCAINE METABOLITE SCREEN URINE: NORMAL
COLOR: YELLOW
CREAT SERPL-MCNC: 0.6 MG/DL (ref 0.9–1.3)
EOSINOPHILS ABSOLUTE: 0 K/UL (ref 0–0.6)
EOSINOPHILS RELATIVE PERCENT: 0.6 %
EPITHELIAL CELLS, UA: 1 /HPF (ref 0–5)
ETHANOL: 57 MG/DL (ref 0–0.08)
GFR AFRICAN AMERICAN: >60
GFR NON-AFRICAN AMERICAN: >60
GLOBULIN: 3.8 G/DL
GLUCOSE BLD-MCNC: 93 MG/DL (ref 70–99)
GLUCOSE URINE: NEGATIVE MG/DL
HCO3 VENOUS: 26.9 MMOL/L (ref 23–29)
HCT VFR BLD CALC: 39.1 % (ref 40.5–52.5)
HEMOGLOBIN: 13.4 G/DL (ref 13.5–17.5)
HYALINE CASTS: 4 /LPF (ref 0–8)
INR BLD: 0.94 (ref 0.86–1.14)
KETONES, URINE: ABNORMAL MG/DL
LACTIC ACID: 2.4 MMOL/L (ref 0.4–2)
LACTIC ACID: 4 MMOL/L (ref 0.4–2)
LEUKOCYTE ESTERASE, URINE: NEGATIVE
LIPASE: 44 U/L (ref 13–60)
LYMPHOCYTES ABSOLUTE: 0.6 K/UL (ref 1–5.1)
LYMPHOCYTES RELATIVE PERCENT: 15 %
Lab: NORMAL
MCH RBC QN AUTO: 33.5 PG (ref 26–34)
MCHC RBC AUTO-ENTMCNC: 34.4 G/DL (ref 31–36)
MCV RBC AUTO: 97.4 FL (ref 80–100)
METHADONE SCREEN, URINE: NORMAL
METHEMOGLOBIN VENOUS: 0.3 %
MICROSCOPIC EXAMINATION: YES
MONOCYTES ABSOLUTE: 0.4 K/UL (ref 0–1.3)
MONOCYTES RELATIVE PERCENT: 10.4 %
NEUTROPHILS ABSOLUTE: 2.8 K/UL (ref 1.7–7.7)
NEUTROPHILS RELATIVE PERCENT: 72.7 %
NITRITE, URINE: NEGATIVE
O2 CONTENT, VEN: 19 VOL %
O2 SAT, VEN: 100 %
O2 THERAPY: ABNORMAL
OPIATE SCREEN URINE: NORMAL
OXYCODONE URINE: NORMAL
PCO2, VEN: 40.7 MMHG (ref 40–50)
PDW BLD-RTO: 12.5 % (ref 12.4–15.4)
PH UA: 7
PH UA: 7 (ref 5–8)
PH VENOUS: 7.43 (ref 7.35–7.45)
PHENCYCLIDINE SCREEN URINE: NORMAL
PLATELET # BLD: 111 K/UL (ref 135–450)
PMV BLD AUTO: 7.3 FL (ref 5–10.5)
PO2, VEN: 181 MMHG (ref 25–40)
POTASSIUM REFLEX MAGNESIUM: 3.8 MMOL/L (ref 3.5–5.1)
PROPOXYPHENE SCREEN: NORMAL
PROTEIN UA: 100 MG/DL
PROTHROMBIN TIME: 10.9 SEC (ref 10–13.2)
RBC # BLD: 4.01 M/UL (ref 4.2–5.9)
RBC UA: 1 /HPF (ref 0–4)
SODIUM BLD-SCNC: 131 MMOL/L (ref 136–145)
SPECIFIC GRAVITY UA: 1.02 (ref 1–1.03)
TCO2 CALC VENOUS: 63 MMOL/L
TOTAL PROTEIN: 8.4 G/DL (ref 6.4–8.2)
TROPONIN: <0.01 NG/ML
URINE REFLEX TO CULTURE: ABNORMAL
URINE TYPE: ABNORMAL
UROBILINOGEN, URINE: 2 E.U./DL
WBC # BLD: 3.8 K/UL (ref 4–11)
WBC UA: 2 /HPF (ref 0–5)

## 2020-05-21 PROCEDURE — 80053 COMPREHEN METABOLIC PANEL: CPT

## 2020-05-21 PROCEDURE — 6360000002 HC RX W HCPCS: Performed by: EMERGENCY MEDICINE

## 2020-05-21 PROCEDURE — 36415 COLL VENOUS BLD VENIPUNCTURE: CPT

## 2020-05-21 PROCEDURE — 96372 THER/PROPH/DIAG INJ SC/IM: CPT

## 2020-05-21 PROCEDURE — 82803 BLOOD GASES ANY COMBINATION: CPT

## 2020-05-21 PROCEDURE — 99285 EMERGENCY DEPT VISIT HI MDM: CPT

## 2020-05-21 PROCEDURE — 84484 ASSAY OF TROPONIN QUANT: CPT

## 2020-05-21 PROCEDURE — 6360000002 HC RX W HCPCS: Performed by: PHYSICIAN ASSISTANT

## 2020-05-21 PROCEDURE — 6370000000 HC RX 637 (ALT 250 FOR IP): Performed by: PHYSICIAN ASSISTANT

## 2020-05-21 PROCEDURE — G0480 DRUG TEST DEF 1-7 CLASSES: HCPCS

## 2020-05-21 PROCEDURE — 71045 X-RAY EXAM CHEST 1 VIEW: CPT

## 2020-05-21 PROCEDURE — 96361 HYDRATE IV INFUSION ADD-ON: CPT

## 2020-05-21 PROCEDURE — 2580000003 HC RX 258: Performed by: PHYSICIAN ASSISTANT

## 2020-05-21 PROCEDURE — 83690 ASSAY OF LIPASE: CPT

## 2020-05-21 PROCEDURE — 96374 THER/PROPH/DIAG INJ IV PUSH: CPT

## 2020-05-21 PROCEDURE — 85610 PROTHROMBIN TIME: CPT

## 2020-05-21 PROCEDURE — 80307 DRUG TEST PRSMV CHEM ANLYZR: CPT

## 2020-05-21 PROCEDURE — U0003 INFECTIOUS AGENT DETECTION BY NUCLEIC ACID (DNA OR RNA); SEVERE ACUTE RESPIRATORY SYNDROME CORONAVIRUS 2 (SARS-COV-2) (CORONAVIRUS DISEASE [COVID-19]), AMPLIFIED PROBE TECHNIQUE, MAKING USE OF HIGH THROUGHPUT TECHNOLOGIES AS DESCRIBED BY CMS-2020-01-R: HCPCS

## 2020-05-21 PROCEDURE — 1200000000 HC SEMI PRIVATE

## 2020-05-21 PROCEDURE — 93005 ELECTROCARDIOGRAM TRACING: CPT | Performed by: EMERGENCY MEDICINE

## 2020-05-21 PROCEDURE — 85025 COMPLETE CBC W/AUTO DIFF WBC: CPT

## 2020-05-21 PROCEDURE — 96375 TX/PRO/DX INJ NEW DRUG ADDON: CPT

## 2020-05-21 PROCEDURE — 83605 ASSAY OF LACTIC ACID: CPT

## 2020-05-21 PROCEDURE — 81001 URINALYSIS AUTO W/SCOPE: CPT

## 2020-05-21 RX ORDER — THIAMINE HYDROCHLORIDE 100 MG/ML
300 INJECTION, SOLUTION INTRAMUSCULAR; INTRAVENOUS NIGHTLY
Status: CANCELLED | OUTPATIENT
Start: 2020-05-21 | End: 2020-05-24

## 2020-05-21 RX ORDER — ONDANSETRON 2 MG/ML
4 INJECTION INTRAMUSCULAR; INTRAVENOUS ONCE
Status: COMPLETED | OUTPATIENT
Start: 2020-05-21 | End: 2020-05-21

## 2020-05-21 RX ORDER — PROMETHAZINE HYDROCHLORIDE 25 MG/1
12.5 TABLET ORAL EVERY 6 HOURS PRN
Status: DISCONTINUED | OUTPATIENT
Start: 2020-05-21 | End: 2020-05-22 | Stop reason: HOSPADM

## 2020-05-21 RX ORDER — LORAZEPAM 1 MG/1
1 TABLET ORAL
Status: DISCONTINUED | OUTPATIENT
Start: 2020-05-21 | End: 2020-05-22 | Stop reason: HOSPADM

## 2020-05-21 RX ORDER — SODIUM CHLORIDE 0.9 % (FLUSH) 0.9 %
10 SYRINGE (ML) INJECTION EVERY 12 HOURS SCHEDULED
Status: DISCONTINUED | OUTPATIENT
Start: 2020-05-21 | End: 2020-05-22 | Stop reason: HOSPADM

## 2020-05-21 RX ORDER — LORAZEPAM 2 MG/ML
1 INJECTION INTRAMUSCULAR ONCE
Status: COMPLETED | OUTPATIENT
Start: 2020-05-21 | End: 2020-05-21

## 2020-05-21 RX ORDER — FOLIC ACID 1 MG/1
1 TABLET ORAL DAILY
Status: DISCONTINUED | OUTPATIENT
Start: 2020-05-22 | End: 2020-05-22 | Stop reason: HOSPADM

## 2020-05-21 RX ORDER — PHENOBARBITAL SODIUM 130 MG/ML
200 INJECTION INTRAMUSCULAR ONCE
Status: COMPLETED | OUTPATIENT
Start: 2020-05-21 | End: 2020-05-21

## 2020-05-21 RX ORDER — SODIUM CHLORIDE 0.9 % (FLUSH) 0.9 %
10 SYRINGE (ML) INJECTION EVERY 12 HOURS SCHEDULED
Status: DISCONTINUED | OUTPATIENT
Start: 2020-05-21 | End: 2020-05-21 | Stop reason: SDUPTHER

## 2020-05-21 RX ORDER — SODIUM CHLORIDE 0.9 % (FLUSH) 0.9 %
10 SYRINGE (ML) INJECTION PRN
Status: DISCONTINUED | OUTPATIENT
Start: 2020-05-21 | End: 2020-05-22 | Stop reason: HOSPADM

## 2020-05-21 RX ORDER — ACETAMINOPHEN 650 MG/1
650 SUPPOSITORY RECTAL EVERY 6 HOURS PRN
Status: DISCONTINUED | OUTPATIENT
Start: 2020-05-21 | End: 2020-05-22 | Stop reason: HOSPADM

## 2020-05-21 RX ORDER — LORAZEPAM 1 MG/1
4 TABLET ORAL
Status: DISCONTINUED | OUTPATIENT
Start: 2020-05-21 | End: 2020-05-22 | Stop reason: HOSPADM

## 2020-05-21 RX ORDER — LORAZEPAM 2 MG/ML
1 INJECTION INTRAMUSCULAR
Status: DISCONTINUED | OUTPATIENT
Start: 2020-05-21 | End: 2020-05-22 | Stop reason: HOSPADM

## 2020-05-21 RX ORDER — LORAZEPAM 2 MG/ML
2 INJECTION INTRAMUSCULAR
Status: DISCONTINUED | OUTPATIENT
Start: 2020-05-21 | End: 2020-05-22 | Stop reason: HOSPADM

## 2020-05-21 RX ORDER — AMLODIPINE BESYLATE 5 MG/1
10 TABLET ORAL DAILY
Status: DISCONTINUED | OUTPATIENT
Start: 2020-05-22 | End: 2020-05-22 | Stop reason: HOSPADM

## 2020-05-21 RX ORDER — SODIUM CHLORIDE 9 MG/ML
INJECTION, SOLUTION INTRAVENOUS CONTINUOUS
Status: DISCONTINUED | OUTPATIENT
Start: 2020-05-21 | End: 2020-05-22 | Stop reason: HOSPADM

## 2020-05-21 RX ORDER — FAMOTIDINE 20 MG/1
20 TABLET, FILM COATED ORAL 2 TIMES DAILY
Status: DISCONTINUED | OUTPATIENT
Start: 2020-05-21 | End: 2020-05-22 | Stop reason: HOSPADM

## 2020-05-21 RX ORDER — CHLORDIAZEPOXIDE HYDROCHLORIDE 25 MG/1
50 CAPSULE, GELATIN COATED ORAL 3 TIMES DAILY
Status: DISCONTINUED | OUTPATIENT
Start: 2020-05-21 | End: 2020-05-22

## 2020-05-21 RX ORDER — 0.9 % SODIUM CHLORIDE 0.9 %
1000 INTRAVENOUS SOLUTION INTRAVENOUS ONCE
Status: COMPLETED | OUTPATIENT
Start: 2020-05-21 | End: 2020-05-21

## 2020-05-21 RX ORDER — LORAZEPAM 2 MG/ML
4 INJECTION INTRAMUSCULAR
Status: DISCONTINUED | OUTPATIENT
Start: 2020-05-21 | End: 2020-05-22 | Stop reason: HOSPADM

## 2020-05-21 RX ORDER — SODIUM CHLORIDE 0.9 % (FLUSH) 0.9 %
10 SYRINGE (ML) INJECTION PRN
Status: DISCONTINUED | OUTPATIENT
Start: 2020-05-21 | End: 2020-05-21 | Stop reason: SDUPTHER

## 2020-05-21 RX ORDER — LORAZEPAM 1 MG/1
2 TABLET ORAL
Status: DISCONTINUED | OUTPATIENT
Start: 2020-05-21 | End: 2020-05-22 | Stop reason: HOSPADM

## 2020-05-21 RX ORDER — LORAZEPAM 1 MG/1
3 TABLET ORAL
Status: DISCONTINUED | OUTPATIENT
Start: 2020-05-21 | End: 2020-05-22 | Stop reason: HOSPADM

## 2020-05-21 RX ORDER — ACETAMINOPHEN 325 MG/1
650 TABLET ORAL EVERY 6 HOURS PRN
Status: DISCONTINUED | OUTPATIENT
Start: 2020-05-21 | End: 2020-05-22 | Stop reason: HOSPADM

## 2020-05-21 RX ORDER — LORAZEPAM 2 MG/ML
3 INJECTION INTRAMUSCULAR
Status: DISCONTINUED | OUTPATIENT
Start: 2020-05-21 | End: 2020-05-22 | Stop reason: HOSPADM

## 2020-05-21 RX ORDER — THIAMINE MONONITRATE (VIT B1) 100 MG
100 TABLET ORAL DAILY
Status: DISCONTINUED | OUTPATIENT
Start: 2020-05-24 | End: 2020-05-22 | Stop reason: HOSPADM

## 2020-05-21 RX ORDER — MULTIVITAMIN WITH IRON
1 TABLET ORAL DAILY
Status: DISCONTINUED | OUTPATIENT
Start: 2020-05-22 | End: 2020-05-22 | Stop reason: HOSPADM

## 2020-05-21 RX ORDER — ONDANSETRON 2 MG/ML
4 INJECTION INTRAMUSCULAR; INTRAVENOUS EVERY 6 HOURS PRN
Status: DISCONTINUED | OUTPATIENT
Start: 2020-05-21 | End: 2020-05-22 | Stop reason: HOSPADM

## 2020-05-21 RX ADMIN — THIAMINE HYDROCHLORIDE 300 MG: 100 INJECTION, SOLUTION INTRAMUSCULAR; INTRAVENOUS at 23:36

## 2020-05-21 RX ADMIN — SODIUM CHLORIDE: 9 INJECTION, SOLUTION INTRAVENOUS at 23:36

## 2020-05-21 RX ADMIN — ONDANSETRON 4 MG: 2 INJECTION INTRAMUSCULAR; INTRAVENOUS at 17:41

## 2020-05-21 RX ADMIN — SODIUM CHLORIDE 1000 ML: 9 INJECTION, SOLUTION INTRAVENOUS at 18:16

## 2020-05-21 RX ADMIN — PHENOBARBITAL SODIUM 200 MG: 130 INJECTION INTRAMUSCULAR at 19:54

## 2020-05-21 RX ADMIN — SODIUM CHLORIDE 1000 ML: 9 INJECTION, SOLUTION INTRAVENOUS at 17:41

## 2020-05-21 RX ADMIN — CHLORDIAZEPOXIDE HYDROCHLORIDE 50 MG: 25 CAPSULE ORAL at 23:36

## 2020-05-21 RX ADMIN — LORAZEPAM 1 MG: 2 INJECTION INTRAMUSCULAR; INTRAVENOUS at 18:57

## 2020-05-21 RX ADMIN — FAMOTIDINE 20 MG: 20 TABLET, FILM COATED ORAL at 23:36

## 2020-05-21 ASSESSMENT — ENCOUNTER SYMPTOMS
RHINORRHEA: 0
SHORTNESS OF BREATH: 0
NAUSEA: 1
ABDOMINAL PAIN: 0
DIARRHEA: 0
VOMITING: 1
COUGH: 0

## 2020-05-21 NOTE — ED PROVIDER NOTES
As physician-in-triage, I performed a medical screening history and physical exam on Ricardo Ram. I also cared for and evaluated the patient in conjunction with the ED Advanced Practice Provider. All diagnostic, treatment, and disposition decisions were made by myself in conjunction with the advanced practice provider. For all further details of the patient's emergency department visit, please see the advanced practice provider's documentation. Presents the ER for evaluation positive tremulousness, with the use of alcohol was ethanol is at the 7 mg/dL, his tox urine is negative for any amphetamines, concern for underlying alcohol withdrawal he has a mild depressed white blood cell count his hemoglobin stable his platelets are slightly depressed. He was given Ativan and phenobarb. He will be placed on alcohol withdrawal taper. Is an extensive work-up for seizures which has been negative as well as for stroke. Patient will be admitted to the hospital for evaluation of acute alcohol withdrawal, he did require benzodiazepine use, as well as barbiturates for alcohol withdrawal tremors and agitation controlled.       Impression: Acute alcohol withdrawal, agitation,     Gerson Lange MD  43/92/93 7393

## 2020-05-21 NOTE — ED NOTES
kelvin Shore at bedside, states not concerned for CoVid-19 at this time.      Linden Johnson RN  05/21/20 9632

## 2020-05-21 NOTE — ED PROVIDER NOTES
[address] [facility]        Pt Name: Adriana Orourke  MRN: [id number]  [place] 1975  Date of evaluation: 5/21/2020  Provider: Echo Marino PA-C  PCP: No primary care provider on file. I have seen and evaluated this patient with my supervising physician Omayra Baumann MD.    [facility]       Chief Complaint   Patient presents with    Emesis     Pt to ER with ff squad from home with c/o fevers, emesis, and visable chills, states started this morning. pt dry heaving at triage, able to bring up scant amount of bile. pt warm to touch, 99.4. pt onloy answering triage questions with yes or no and whispers. speaks Albanian, but able to understand Lonza Hausen. HISTORY OF PRESENT ILLNESS   (Location, Timing/Onset, Context/Setting, Quality, Duration, Modifying Factors, Severity, Associated Signs and Symptoms)  Note limiting factors. Adriana Orourke is a 38 y.o. male who presents to the emergency department today for evaluation for evaluation for nausea and vomiting. The patient states that he felt fine last night when he went to bed, and when he woke up this morning he had multiple episodes of nausea and vomiting. There is been no reports of any bilious emesis, hematemesis or coffee-ground emesis. The patient has no abdominal pain or diarrhea. The patient has chills, but has not had any fevers at home and is afebrile when he arrived to the ED. There is been no chest pain or shortness of breath. He has no cough or congestion. He has no urinary symptoms. No known sick contacts. He denies any alcohol use, drug use or tobacco use. He is not eating any new or different foods. The patient otherwise has no complaints. Nursing Notes were all reviewed and agreed with or any disagreements were addressed in the HPI.     REVIEW OF SYSTEMS    (2-9 systems for level 4, 10 or more for level 5)     Review of Systems   Constitutional: Positive for following components:    Lactic Acid 4.0 (*)     All other components within normal limits    Narrative:     T[facility] [place]cl[place]  [phone],  Chemistry results called to and read back by RNTesfaye, 05/21/2020  18:07, by [facility]  Performed at:  [facility]  [address], [address]   Phone [phone]   URINE RT REFLEX TO CULTURE - Abnormal; Notable for the following components:    Ketones, Urine TRACE (*)     Protein,  (*)     Urobilinogen, Urine 2.0 (*)     All other components within normal limits    Narrative:     Performed at:  [facility] [address], [address]   Phone [phone]   LACTIC ACID, PLASMA - Abnormal; Notable for the following components:    Lactic Acid 2.4 (*)     All other components within normal limits    Narrative:     Performed at:  [facility] [address] [place], [address]   Phone [phone]   TROPONIN    Narrative:     Performed at:  [facility] [address], [address]   Phone [phone]   PROTIME-INR    Narrative:     Performed at:  [facility]  [email or web address],  [place], [address]   Phone [phone]   ETHANOL    Narrative:     Performed at:  [facility] [address] [place], [address]   Phone [phone]   URINE DRUG SCREEN    Narrative:     Performed at:  [facility] [email or web address],  [place], [address]   Phone [phone]   LIPASE    Narrative:     Performed at:  [facility] [address] [place], [address]   Phone [phone]   MICROSCOPIC URINALYSIS    Narrative:     Performed at:  [facility]  [email or web address],  [place], [address]   Phone [phone]       All other labs were

## 2020-05-22 VITALS
RESPIRATION RATE: 14 BRPM | HEART RATE: 82 BPM | DIASTOLIC BLOOD PRESSURE: 67 MMHG | HEIGHT: 67 IN | TEMPERATURE: 98.5 F | BODY MASS INDEX: 22.85 KG/M2 | WEIGHT: 145.6 LBS | SYSTOLIC BLOOD PRESSURE: 103 MMHG | OXYGEN SATURATION: 99 %

## 2020-05-22 LAB
A/G RATIO: 1.1 (ref 1.1–2.2)
ALBUMIN SERPL-MCNC: 3.8 G/DL (ref 3.4–5)
ALP BLD-CCNC: 61 U/L (ref 40–129)
ALT SERPL-CCNC: 68 U/L (ref 10–40)
ANION GAP SERPL CALCULATED.3IONS-SCNC: 10 MMOL/L (ref 3–16)
AST SERPL-CCNC: 79 U/L (ref 15–37)
BILIRUB SERPL-MCNC: 0.8 MG/DL (ref 0–1)
BILIRUB SERPL-MCNC: 0.9 MG/DL (ref 0–1)
BILIRUBIN DIRECT: <0.2 MG/DL (ref 0–0.3)
BILIRUBIN, INDIRECT: NORMAL MG/DL (ref 0–1)
BLOOD SMEAR REVIEW: NORMAL
BUN BLDV-MCNC: 10 MG/DL (ref 7–20)
C DIFF TOXIN/ANTIGEN: NORMAL
CALCIUM SERPL-MCNC: 9.3 MG/DL (ref 8.3–10.6)
CHLORIDE BLD-SCNC: 97 MMOL/L (ref 99–110)
CO2: 26 MMOL/L (ref 21–32)
CREAT SERPL-MCNC: 0.7 MG/DL (ref 0.9–1.3)
EKG ATRIAL RATE: 96 BPM
EKG DIAGNOSIS: NORMAL
EKG P AXIS: 58 DEGREES
EKG P-R INTERVAL: 136 MS
EKG Q-T INTERVAL: 358 MS
EKG QRS DURATION: 94 MS
EKG QTC CALCULATION (BAZETT): 452 MS
EKG R AXIS: 44 DEGREES
EKG T AXIS: 37 DEGREES
EKG VENTRICULAR RATE: 96 BPM
GFR AFRICAN AMERICAN: >60
GFR NON-AFRICAN AMERICAN: >60
GLOBULIN: 3.6 G/DL
GLUCOSE BLD-MCNC: 80 MG/DL (ref 70–99)
HCT VFR BLD CALC: 37.5 % (ref 40.5–52.5)
HCT VFR BLD CALC: 38 % (ref 40.5–52.5)
HEMOGLOBIN: 13.3 G/DL (ref 13.5–17.5)
IMMATURE RETIC FRACT: 0.46 (ref 0.21–0.37)
LACTATE DEHYDROGENASE: 181 U/L (ref 100–190)
LACTIC ACID: 0.8 MMOL/L (ref 0.4–2)
MAGNESIUM: 1.6 MG/DL (ref 1.8–2.4)
MCH RBC QN AUTO: 33.8 PG (ref 26–34)
MCHC RBC AUTO-ENTMCNC: 35 G/DL (ref 31–36)
MCV RBC AUTO: 96.6 FL (ref 80–100)
PDW BLD-RTO: 12.6 % (ref 12.4–15.4)
PLATELET # BLD: 97 K/UL (ref 135–450)
PMV BLD AUTO: 7.1 FL (ref 5–10.5)
POTASSIUM REFLEX MAGNESIUM: 3.5 MMOL/L (ref 3.5–5.1)
RBC # BLD: 3.94 M/UL (ref 4.2–5.9)
RETICULOCYTE ABSOLUTE COUNT: 0.05 M/UL
RETICULOCYTE COUNT PCT: 1.25 % (ref 0.5–2.18)
SARS-COV-2, PCR: NOT DETECTED
SODIUM BLD-SCNC: 133 MMOL/L (ref 136–145)
TOTAL PROTEIN: 7.4 G/DL (ref 6.4–8.2)
WBC # BLD: 3.9 K/UL (ref 4–11)

## 2020-05-22 PROCEDURE — 83010 ASSAY OF HAPTOGLOBIN QUANT: CPT

## 2020-05-22 PROCEDURE — 87449 NOS EACH ORGANISM AG IA: CPT

## 2020-05-22 PROCEDURE — 83615 LACTATE (LD) (LDH) ENZYME: CPT

## 2020-05-22 PROCEDURE — 83735 ASSAY OF MAGNESIUM: CPT

## 2020-05-22 PROCEDURE — 82247 BILIRUBIN TOTAL: CPT

## 2020-05-22 PROCEDURE — 83540 ASSAY OF IRON: CPT

## 2020-05-22 PROCEDURE — 85027 COMPLETE CBC AUTOMATED: CPT

## 2020-05-22 PROCEDURE — 83550 IRON BINDING TEST: CPT

## 2020-05-22 PROCEDURE — 82728 ASSAY OF FERRITIN: CPT

## 2020-05-22 PROCEDURE — 82248 BILIRUBIN DIRECT: CPT

## 2020-05-22 PROCEDURE — 6360000002 HC RX W HCPCS: Performed by: PHYSICIAN ASSISTANT

## 2020-05-22 PROCEDURE — 6370000000 HC RX 637 (ALT 250 FOR IP): Performed by: PHYSICIAN ASSISTANT

## 2020-05-22 PROCEDURE — 93010 ELECTROCARDIOGRAM REPORT: CPT | Performed by: INTERNAL MEDICINE

## 2020-05-22 PROCEDURE — 2580000003 HC RX 258: Performed by: PHYSICIAN ASSISTANT

## 2020-05-22 PROCEDURE — 85045 AUTOMATED RETICULOCYTE COUNT: CPT

## 2020-05-22 PROCEDURE — 82746 ASSAY OF FOLIC ACID SERUM: CPT

## 2020-05-22 PROCEDURE — 36415 COLL VENOUS BLD VENIPUNCTURE: CPT

## 2020-05-22 PROCEDURE — 82607 VITAMIN B-12: CPT

## 2020-05-22 PROCEDURE — 87324 CLOSTRIDIUM AG IA: CPT

## 2020-05-22 PROCEDURE — 80053 COMPREHEN METABOLIC PANEL: CPT

## 2020-05-22 PROCEDURE — 83605 ASSAY OF LACTIC ACID: CPT

## 2020-05-22 RX ORDER — LOPERAMIDE HYDROCHLORIDE 2 MG/1
2 CAPSULE ORAL 3 TIMES DAILY PRN
Status: DISCONTINUED | OUTPATIENT
Start: 2020-05-22 | End: 2020-05-22 | Stop reason: HOSPADM

## 2020-05-22 RX ADMIN — ENOXAPARIN SODIUM 40 MG: 40 INJECTION SUBCUTANEOUS at 09:01

## 2020-05-22 RX ADMIN — FAMOTIDINE 20 MG: 20 TABLET, FILM COATED ORAL at 09:01

## 2020-05-22 RX ADMIN — CHLORDIAZEPOXIDE HYDROCHLORIDE 50 MG: 25 CAPSULE ORAL at 14:25

## 2020-05-22 RX ADMIN — THERA TABS 1 TABLET: TAB at 09:01

## 2020-05-22 RX ADMIN — FOLIC ACID 1 MG: 1 TABLET ORAL at 09:02

## 2020-05-22 RX ADMIN — AMLODIPINE BESYLATE 10 MG: 5 TABLET ORAL at 09:01

## 2020-05-22 RX ADMIN — SODIUM CHLORIDE: 9 INJECTION, SOLUTION INTRAVENOUS at 09:53

## 2020-05-22 RX ADMIN — CHLORDIAZEPOXIDE HYDROCHLORIDE 50 MG: 25 CAPSULE ORAL at 09:01

## 2020-05-22 ASSESSMENT — PAIN SCALES - GENERAL
PAINLEVEL_OUTOF10: 0
PAINLEVEL_OUTOF10: 0

## 2020-05-22 NOTE — FLOWSHEET NOTE
IV removed went over discharge instructions. Labs were drawn. Discussed ride home. Pt did not have one.

## 2020-05-22 NOTE — ACP (ADVANCE CARE PLANNING)
Advance Care Planning     Advance Care Planning Clinical Specialist  Conversation Note      Date of ACP Conversation: 5/21/2020    Conversation Conducted with: artur    ACP Clinical Specialist: [id number]  makes decisions on behalf of the incapacitated patient: Decision Maker is asked to consider and make decisions based on patient values, known preferences, or best interests. Health Care Decision Maker:   Patient makes his own decisions    Current Designated [place]:   (If there is a [address] named in the [facility] Makers\" box in the ACP activity, but it is not visible above, be sure to open that field and then select the health care decision maker relationship (ie \"primary\") in the blank space to the right of the name.) Validate  this information as still accurate & up-to-date; edit [place] field as needed.)    Note: Assess and validate information in current ACP documents, as indicated. If no Decision Maker listed above or available through scanned documents, then:    If no Authorized Decision Maker has previously been identified, then patient chooses [place]:  \"Who would you like to name as your primary health care decision-maker? \"               Name: Ginna Rasmussen        Relationship: spouse          Phone number: [phone]  Idalmis Hug this person be reached easily? \" Yes      Note: If the relationship of these Decision-Makers to the patient does NOT follow your state's Next of Kin hierarchy, recommend that patient complete ACP document that meets state-specific requirements to allow them to act on the patient's behalf when appropriate. Care Preferences    Hospitalization: \"If your health worsens and it becomes clear that your chance of recovery is unlikely, what would your preference be regarding hospitalization? \"    Choice:  [x] The patient wants hospitalization  [] The patient prefers

## 2020-05-22 NOTE — PROGRESS NOTES
[facility]   CONSULT NOTE    5/22/2020 1:15 PM    Patient Information: Duc Olguin   Date of Admit:  5/21/2020  Primary Care Physician:  No primary care provider on file. Requesting Physician:  Tatyana Pool MD    Reason for consult:   Evaluation and recommendations for pancytopenia    Chief complaint:    Chief Complaint   Patient presents with    Emesis     Pt to ER with ff squad from home with c/o fevers, emesis, and visable chills, states started this morning. pt dry heaving at triage, able to bring up scant amount of bile. pt warm to touch, 99.4. pt onloy answering triage questions with yes or no and whispers. speaks Chadian, but able to understand [address] History of Present Illness:  Duc Olguin is a 38 y.o. male on Tatyana Pool MD service who was admitted on 5/21/2020 for nausea and vomiting. He was admitted with alcohol withdrawal. He was found to have pancytopenia. He has no fevers, chills or night sweats. No weight loss. No external bruising or bleeding. He had pancytopenia when he was admitted last month with seizures due to alcohol withdrawal. He was tested for COVID-19 during this admission which was negative. Also negative for C. Diff and UTI. Past Medical History:     has a past medical history of HTN (hypertension). Past Surgical History:    History reviewed. No pertinent surgical history. Current Medications:     amLODIPine  10 mg Oral Daily    sodium chloride flush  10 mL Intravenous 2 times per day    famotidine  20 mg Oral BID    enoxaparin  40 mg Subcutaneous Daily    folic acid  1 mg Oral Daily    multivitamin  1 tablet Oral Daily    chlordiazePOXIDE  50 mg Oral TID    thiamine (VITAMIN B1) IVPB  300 mg Intravenous Q24H    Followed by   Gokul Show ON 5/24/2020] thiamine  100 mg Oral Daily       Allergies:    No Known Allergies     Social History:    reports that he has been smoking.  He has never used smokeless tobacco. He reports current vomiting, ams   TECHNOLOGIST PROVIDED HISTORY:   Additional Contrast?->None   Reason for exam:->vomiting, ams   Reason for Exam: AMS vomiting   Acuity: Acute   Type of Exam: Initial   Additional signs and symptoms: seizure       FINDINGS:   Lower Chest: Limited images through the lung bases are unremarkable.       Organs: Lack of intravenous contrast limits evaluation of the solid organs,   vascular structures, and bowel.  The liver and gallbladder are unremarkable. No biliary ductal dilatation is identified.  The pancreas, spleen, and   bilateral adrenal glands are unremarkable.  13 mm cyst in the left kidney. The right kidney is normal in appearance.  No obstructive uropathy or urinary   collecting system calculi.       GI/Bowel: Normal appendix.  The colon is unremarkable.  No bowel obstruction   or abnormal bowel wall thickening.       Pelvis: The urinary bladder is normal in appearance.  The prostate gland is   unremarkable.  No free fluid in the pelvis.  No pelvic or inguinal   lymphadenopathy.       Peritoneum/Retroperitoneum: The abdominal aorta is normal in appearance.  No   retroperitoneal or mesenteric lymphadenopathy is identified.  No free air or   fluid is seen in the abdomen.       Bones/Soft Tissues: No acute osseous or soft tissue abnormality.           Impression   No acute abnormality in the abdomen or pelvis.         Narrative   EXAMINATION:   MRI OF THE BRAIN WITHOUT CONTRAST  4/27/2020 3:32 pm       TECHNIQUE:   Multiplanar multisequence MRI of the brain was performed without the   administration of intravenous contrast.       COMPARISON:   None       HISTORY:   ORDERING SYSTEM PROVIDED HISTORY: seizures altered mental state   TECHNOLOGIST PROVIDED HISTORY:   Reason for exam:->seizures altered mental state   Reason for Exam: Pt brought in per [address] [place] [address], per report family reports   possible seizure, pt was shaking and not responsive for short period of time.    Pt arrives alert and oriented for projection and lung volumes.  Lungs clear.  Costophrenic   angles sharp           Impression   No evidence of pneumonia           IMPRESSION/RECOMMENDATIONS:    Active Problems:    Alcohol dependence with withdrawal (HCC)  Resolved Problems:    * No resolved hospital problems. *       41 year old male with a history of hypertension. Admitted with alcohol withdrawal. He has:    Pancytopenia:  - Likely bone marrow suppression secondary to ETOH abuse. - Will check iron studies, B12 and folate. - Workup for hemolysis. - Peripheral smear to be reviewed. This plan was discussed with the patient and he/she verbalized understanding. Thank you for allowing us to participate in the care of this patient.      Maxim Trinidad, [facility]  [facility], [address] [place] [address].  [phone]

## 2020-05-22 NOTE — PLAN OF CARE
Problem: Safety:  Goal: Ability to remain free from injury will improve  Description: Ability to remain free from injury will improve  5/22/2020 0946 by Savanna Connor RN  Outcome: Ongoing  5/22/2020 0123 by Maeve Zuluaga RN  Outcome: Ongoing   Independent after set up     Problem: Falls - Risk of:  Goal: Will remain free from falls  Description: Will remain free from falls  Outcome: Ongoing   Will monitor

## 2020-05-22 NOTE — PROGRESS NOTES
Assessment completed, see flowsheet for details. Respiratory: Lung sounds are clear bilaterally. GI: Bowel sounds are present in all quadrants. No N/V reported. : Within defined limits. Neuro: Pt A/O x4     Peripheral vascular: Pulses are palpable in all extremities. Tremors noted on Bilateral upper extremities. Skin:  Abrasion to the lip noted. No evidence of new skin breakdown assessed during shift. Pain: Pt reported a 0 on a 0-10 scale. /89   Pulse 93   Temp 99.3 °F (37.4 °C) (Temporal)   Resp 28   Ht 5' 7\" (1.702 m)   Wt 145 lb 4.8 oz (65.9 kg)   SpO2 98%   BMI 22.76 kg/m²      Call light within reach. Will continue to monitor.

## 2020-05-22 NOTE — DISCHARGE SUMMARY
Hospital Medicine Discharge Summary    Patient: Nikolai Bravo     Gender: male  : 1975   Age: 38 y.o. MRN: [id number]    Admitting Physician: Eliza Martin MD  Discharge Physician: Valentin Ramirez MD     Code Status: Full Code     Admit Date: 2020   Discharge Date:   2020    Disposition:  Home    Discharge Diagnoses: Active Hospital Problems    Diagnosis Date Noted    Alcohol dependence with withdrawal [facility]) [F10.239] 2020       Follow-up appointments:  two weeks    Outpatient to do list: get a pcp    Condition at Discharge:  [address] Course:   Nikolai Bravo is a 38 y.o. male who presented to ED with complaint of nausea and vomiting. He reports he felt fine when he went to bed last night but when he woke up this morning he was nauseous and has had multiple episodes of vomiting throughout the day. He denies any bilious emesis, hematemesis, or coffee-ground emesis. He denies abdominal pain, diarrhea, constipation. He does report chills but denies any fevers. He denies dizziness, chest pain, shortness of breath, cough, edema, pain with urination. He denies any known sick contacts or recent travel. He denies any drug or alcohol use. Of note, patient was admitted about a month ago for seizure-like activity after vomiting. He had an extensive workup with negative EEG and MRI showing mild periventricular disease. Patient did not have any prior history of seizures and had no further seizure activity throughout hospital course. Neurology recommended repeat MRI in 6 months, and he was not discharged on any antiepileptics.     Work-up initiated in ED. CXR negative for acute process. UA negative for UTI. EKG shows NSR with nonspecific ST/T changes without evidence of acute ischemia or infarction. Troponin negative. Mild hyponatremia noted (Na 131). Mild transaminitis noted (, ALT 88), stable compared to previous.   Pancytopenia noted (WBC 3.8, Hgb 13.4, Plt 111), stable compared to previous. Although patient denies any alcohol use, his ethanol level was 57. Urine drug screen negative. Initial lactic acid 4.0, improved to 2.4 following 2L IV NS in ED. Patient reports nausea has improved with treatment in ED    Alcohol dependence with withdrawal  Patient advised to stop drinking alcohol and was advised to join a 12 step program.    for resources. IV thiamine, PO folic acid, MVI  CIWA protocol to manage withdrawal.  Librium 50 mg TID  He did not require any ativan     Nausea and vomiting  Patient denies abdominal pain  Stable transaminitis. Lipase WNL. Antiemetics. IVF  Resolved and tolerating a regular diet     Pancytopenia  Stable. WBC 3.8, Hgb 13.4, Plt 111  No obvious s/s of bleeding. Afebrile. No previous workup available for review  Likely suppressed from etoh     Hyponatremia  Mild and asymptomatic  IVF with NS   Monitor Na     Transaminitis  Stable.  Suspect due to alcohol use  Will repeat in AM  If LFTs remain stable, recommend outpatient follow up for further evaluation     HTN  Continue home norvasc      COVID-19 NEGATIVE     Discharge Medications:   Current Discharge Medication List        Current Discharge Medication List        Current Discharge Medication List      CONTINUE these medications which have NOT CHANGED    Details   amLODIPine (NORVASC) 10 MG tablet Take 1 tablet by mouth daily  Qty: 30 tablet, Refills: 3           Current Discharge Medication List          Discharge ROS:  A complete review of systems was asked and negative except for wanting to go home    Discharge Exam:    /67   Pulse 82   Temp 98.5 °F (36.9 °C) (Temporal)   Resp 14   Ht 5' 7\" (1.702 m)   Wt 145 lb 9.6 oz (66 kg)   SpO2 99%   BMI 22.80 kg/m²   General appearance:  NAD  HEENT:   Normal cephalic, atraumatic, moist mucous membranes, no oropharyngeal erythema or exudate  Neck: Supple, trachea midline, no anterior cervical or SC LAD  Heart[de-identified] Normal s1/s2, RRR, no

## 2020-05-22 NOTE — PLAN OF CARE
Problem: Coping:  Goal: Ability to cope will improve  Description: Ability to cope will improve  Outcome: Ongoing  Goal: Ability to identify appropriate support needs will improve  Description: Ability to identify appropriate support needs will improve  Outcome: Ongoing     Problem: Safety:  Goal: Ability to remain free from injury will improve  Description: Ability to remain free from injury will improve  Outcome: Ongoing     Problem: Coping:  Goal: Ability to cope will improve  Description: Ability to cope will improve  Outcome: Ongoing  Goal: Ability to identify appropriate support needs will improve  Description: Ability to identify appropriate support needs will improve  Outcome: Ongoing     Problem: Safety:  Goal: Ability to remain free from injury will improve  Description: Ability to remain free from injury will improve  Outcome: Ongoing

## 2020-05-22 NOTE — H&P
N/A    Diet: DIET GENERAL;  Code Status: Full Code    Consults:  IP CONSULT TO HOSPITALIST  IP CONSULT TO SOCIAL WORK  IP CONSULT TO HEM/ONC    Disposition: Admit to Inpatient   ELOS: Greater than two midnights due to medical therapy     Vicente Mueller PA-C    Thank you for the opportunity to be involved in this patient's care. If you have any questions or concerns please feel free to contact me at [phone].

## 2020-05-23 LAB
FERRITIN: 1029 NG/ML (ref 30–400)
FOLATE: 10.67 NG/ML (ref 4.78–24.2)
HAPTOGLOBIN: 83 MG/DL (ref 30–200)
IRON SATURATION: 43 % (ref 20–50)
IRON: 96 UG/DL (ref 59–158)
TOTAL IRON BINDING CAPACITY: 222 UG/DL (ref 260–445)
VITAMIN B-12: 1269 PG/ML (ref 211–911)

## 2020-06-09 ENCOUNTER — HOSPITAL ENCOUNTER (EMERGENCY)
Age: 45
Discharge: HOME | End: 2020-06-09
Payer: MEDICAID

## 2020-06-09 ENCOUNTER — APPOINTMENT (OUTPATIENT)
Dept: CT IMAGING | Age: 45
End: 2020-06-09
Payer: MEDICAID

## 2020-06-09 ENCOUNTER — HOSPITAL ENCOUNTER (INPATIENT)
Age: 45
LOS: 2 days | Discharge: HOME | End: 2020-06-12
Attending: EMERGENCY MEDICINE | Admitting: INTERNAL MEDICINE
Payer: MEDICAID

## 2020-06-09 VITALS
SYSTOLIC BLOOD PRESSURE: 115 MMHG | RESPIRATION RATE: 18 BRPM | HEART RATE: 89 BPM | BODY MASS INDEX: 22.91 KG/M2 | HEIGHT: 67 IN | DIASTOLIC BLOOD PRESSURE: 60 MMHG | TEMPERATURE: 97.5 F | WEIGHT: 146 LBS | OXYGEN SATURATION: 94 %

## 2020-06-09 LAB
A/G RATIO: 1 (ref 1.1–2.2)
ALBUMIN SERPL-MCNC: 4.1 G/DL (ref 3.4–5)
ALP BLD-CCNC: 88 U/L (ref 40–129)
ALT SERPL-CCNC: 29 U/L (ref 10–40)
ANION GAP SERPL CALCULATED.3IONS-SCNC: 17 MMOL/L (ref 3–16)
AST SERPL-CCNC: 71 U/L (ref 15–37)
BASOPHILS ABSOLUTE: 0.2 K/UL (ref 0–0.2)
BASOPHILS RELATIVE PERCENT: 4.7 %
BILIRUB SERPL-MCNC: 0.4 MG/DL (ref 0–1)
BILIRUBIN URINE: NEGATIVE
BLOOD, URINE: NEGATIVE
BUN BLDV-MCNC: 11 MG/DL (ref 7–20)
CALCIUM SERPL-MCNC: 8.6 MG/DL (ref 8.3–10.6)
CHLORIDE BLD-SCNC: 97 MMOL/L (ref 99–110)
CLARITY: CLEAR
CO2: 20 MMOL/L (ref 21–32)
COLOR: YELLOW
CREAT SERPL-MCNC: 0.8 MG/DL (ref 0.9–1.3)
EOSINOPHILS ABSOLUTE: 0.2 K/UL (ref 0–0.6)
EOSINOPHILS RELATIVE PERCENT: 5.4 %
GFR AFRICAN AMERICAN: >60
GFR NON-AFRICAN AMERICAN: >60
GLOBULIN: 4 G/DL
GLUCOSE BLD-MCNC: 99 MG/DL (ref 70–99)
GLUCOSE URINE: NEGATIVE MG/DL
HCT VFR BLD CALC: 41.5 % (ref 40.5–52.5)
HEMOGLOBIN: 14.2 G/DL (ref 13.5–17.5)
KETONES, URINE: NEGATIVE MG/DL
LEUKOCYTE ESTERASE, URINE: NEGATIVE
LIPASE: 79 U/L (ref 13–60)
LYMPHOCYTES ABSOLUTE: 1.3 K/UL (ref 1–5.1)
LYMPHOCYTES RELATIVE PERCENT: 35.1 %
MCH RBC QN AUTO: 34.1 PG (ref 26–34)
MCHC RBC AUTO-ENTMCNC: 34.3 G/DL (ref 31–36)
MCV RBC AUTO: 99.2 FL (ref 80–100)
MICROSCOPIC EXAMINATION: NORMAL
MONOCYTES ABSOLUTE: 0.5 K/UL (ref 0–1.3)
MONOCYTES RELATIVE PERCENT: 12.2 %
NEUTROPHILS ABSOLUTE: 1.6 K/UL (ref 1.7–7.7)
NEUTROPHILS RELATIVE PERCENT: 42.6 %
NITRITE, URINE: NEGATIVE
PDW BLD-RTO: 13.1 % (ref 12.4–15.4)
PH UA: 5.5 (ref 5–8)
PLATELET # BLD: 158 K/UL (ref 135–450)
PMV BLD AUTO: 7.1 FL (ref 5–10.5)
POTASSIUM SERPL-SCNC: 4.4 MMOL/L (ref 3.5–5.1)
PROTEIN UA: NEGATIVE MG/DL
RBC # BLD: 4.18 M/UL (ref 4.2–5.9)
SODIUM BLD-SCNC: 134 MMOL/L (ref 136–145)
SPECIFIC GRAVITY UA: 1.02 (ref 1–1.03)
TOTAL PROTEIN: 8.1 G/DL (ref 6.4–8.2)
URINE REFLEX TO CULTURE: NORMAL
URINE TYPE: NORMAL
UROBILINOGEN, URINE: 0.2 E.U./DL
WBC # BLD: 3.8 K/UL (ref 4–11)

## 2020-06-09 PROCEDURE — 96374 THER/PROPH/DIAG INJ IV PUSH: CPT

## 2020-06-09 PROCEDURE — 99285 EMERGENCY DEPT VISIT HI MDM: CPT

## 2020-06-09 PROCEDURE — 83690 ASSAY OF LIPASE: CPT

## 2020-06-09 PROCEDURE — 80053 COMPREHEN METABOLIC PANEL: CPT

## 2020-06-09 PROCEDURE — 6360000002 HC RX W HCPCS: Performed by: PHYSICIAN ASSISTANT

## 2020-06-09 PROCEDURE — 96375 TX/PRO/DX INJ NEW DRUG ADDON: CPT

## 2020-06-09 PROCEDURE — 81003 URINALYSIS AUTO W/O SCOPE: CPT

## 2020-06-09 PROCEDURE — 85025 COMPLETE CBC W/AUTO DIFF WBC: CPT

## 2020-06-09 PROCEDURE — 6360000004 HC RX CONTRAST MEDICATION: Performed by: PHYSICIAN ASSISTANT

## 2020-06-09 PROCEDURE — 2580000003 HC RX 258: Performed by: PHYSICIAN ASSISTANT

## 2020-06-09 PROCEDURE — 99284 EMERGENCY DEPT VISIT MOD MDM: CPT

## 2020-06-09 PROCEDURE — 36415 COLL VENOUS BLD VENIPUNCTURE: CPT

## 2020-06-09 PROCEDURE — 74177 CT ABD & PELVIS W/CONTRAST: CPT

## 2020-06-09 RX ORDER — DICYCLOMINE HYDROCHLORIDE 10 MG/1
10 CAPSULE ORAL EVERY 6 HOURS PRN
Qty: 20 CAPSULE | Refills: 0 | Status: ON HOLD | OUTPATIENT
Start: 2020-06-09 | End: 2020-06-12 | Stop reason: HOSPADM

## 2020-06-09 RX ORDER — MORPHINE SULFATE 4 MG/ML
4 INJECTION, SOLUTION INTRAMUSCULAR; INTRAVENOUS ONCE
Status: COMPLETED | OUTPATIENT
Start: 2020-06-09 | End: 2020-06-09

## 2020-06-09 RX ORDER — 0.9 % SODIUM CHLORIDE 0.9 %
1000 INTRAVENOUS SOLUTION INTRAVENOUS ONCE
Status: COMPLETED | OUTPATIENT
Start: 2020-06-09 | End: 2020-06-09

## 2020-06-09 RX ORDER — ONDANSETRON 2 MG/ML
4 INJECTION INTRAMUSCULAR; INTRAVENOUS ONCE
Status: COMPLETED | OUTPATIENT
Start: 2020-06-09 | End: 2020-06-09

## 2020-06-09 RX ORDER — ONDANSETRON 4 MG/1
4 TABLET, ORALLY DISINTEGRATING ORAL EVERY 8 HOURS PRN
Qty: 20 TABLET | Refills: 0 | Status: SHIPPED | OUTPATIENT
Start: 2020-06-09

## 2020-06-09 RX ADMIN — MORPHINE SULFATE 4 MG: 4 INJECTION INTRAVENOUS at 12:51

## 2020-06-09 RX ADMIN — ONDANSETRON 4 MG: 2 INJECTION INTRAMUSCULAR; INTRAVENOUS at 12:51

## 2020-06-09 RX ADMIN — SODIUM CHLORIDE 1000 ML: 9 INJECTION, SOLUTION INTRAVENOUS at 12:51

## 2020-06-09 RX ADMIN — IOPAMIDOL 75 ML: 755 INJECTION, SOLUTION INTRAVENOUS at 13:02

## 2020-06-09 ASSESSMENT — PAIN SCALES - WONG BAKER: WONGBAKER_NUMERICALRESPONSE: 2

## 2020-06-09 ASSESSMENT — PAIN DESCRIPTION - PROGRESSION: CLINICAL_PROGRESSION: RESOLVED

## 2020-06-09 ASSESSMENT — ENCOUNTER SYMPTOMS
CHEST TIGHTNESS: 0
BACK PAIN: 0
ABDOMINAL PAIN: 1
VOMITING: 0
NAUSEA: 0
SHORTNESS OF BREATH: 0
DIARRHEA: 0

## 2020-06-09 ASSESSMENT — PAIN SCALES - GENERAL: PAINLEVEL_OUTOF10: 5

## 2020-06-09 ASSESSMENT — PAIN DESCRIPTION - LOCATION: LOCATION: ABDOMEN

## 2020-06-09 ASSESSMENT — PAIN DESCRIPTION - DESCRIPTORS: DESCRIPTORS: CRAMPING;DISCOMFORT

## 2020-06-09 NOTE — ED PROVIDER NOTES
[address] [facility]        Pt Name: Jassi Aldana  MRN: [id number]  [place] 1975  Date of evaluation: 6/9/2020  Provider: Joyce Bueno PA-C  PCP: No primary care provider on file. I have seen and evaluated this patient with my supervising physician No att. providers found. CHIEF COMPLAINT       Chief Complaint   Patient presents with    Abdominal Pain     pt brought in by  EMS from home. Pt c/o intermitten lower abd pain. Sts it makes it hard to eat. But denies n/v/d or constipation. HISTORY OF PRESENT ILLNESS   (Location, Timing/Onset, Context/Setting, Quality, Duration, Modifying Factors, Severity, Associated Signs and Symptoms)  Note limiting factors. Jassi Aldana is a 38 y.o. male presents the emergency department with reports of increasing lower abdominal pain that began this morning. Patient states that he has had both right lower quadrant as well as left lower quadrant abdominal tenderness. He states it feels somewhat crampy in nature but has become increasingly constant through the course of the morning. He states he has mild nausea and is not hungry but reports he is not had any vomiting. He reports positive flatus and denies diarrhea or constipation. Patient's had difficulty similar to this in the past but he does not know what his diagnosis was. He states the pain, discomfort was so bad he had to call emergency medical services was summoned by [place] to his home to bring him to the emergency department for evaluation and treatment. At the present time he is reporting to me pain and discomfort over his abdomen rating it to be 7 out of 10. He is found nothing to make the symptoms better and or worsen with this he presents for evaluation and treatment. Upon further questioning the patient denies hematuria and or flank pain. He has no additional GI or  complaints.   He denies chest pain, palpitations, lightheadedness shortness of breath denies fevers and or chills and has had no sick contacts. No reports of concerns for possibility of foodborne or waterborne illness and no reports of recent antibiotic use. Nursing Notes were all reviewed and agreed with or any disagreements were addressed in the HPI. REVIEW OF SYSTEMS    (2-9 systems for level 4, 10 or more for level 5)     Review of Systems   Constitutional: Negative for activity change, chills and fever. Respiratory: Negative for chest tightness and shortness of breath. Cardiovascular: Negative for chest pain. Gastrointestinal: Positive for abdominal pain. Negative for diarrhea, nausea and vomiting. Genitourinary: Negative for dysuria and flank pain. Musculoskeletal: Negative for back pain and myalgias. Skin: Negative for rash and wound. Positives and Pertinent negatives as per HPI. Except as noted above in the ROS, all other systems were reviewed and negative. PAST MEDICAL HISTORY     Past Medical History:   Diagnosis Date    HTN (hypertension)          SURGICAL HISTORY   History reviewed. No pertinent surgical history. CURRENTMEDICATIONS       Previous Medications    AMLODIPINE (NORVASC) 10 MG TABLET    Take 1 tablet by mouth daily         ALLERGIES     Patient has no known allergies. FAMILYHISTORY     History reviewed. No pertinent family history. SOCIAL HISTORY       Social History     Tobacco Use    Smoking status: Former Smoker    Smokeless tobacco: Never Used    Tobacco comment: one a day    Substance Use Topics    Alcohol use:  Yes     Alcohol/week: 2.0 standard drinks     Types: 2 Cans of beer per week     Comment: socially    Drug use: Never       SCREENINGS             PHYSICAL EXAM    (up to 7 for level 4, 8 or more for level 5)     ED Triage Vitals [06/09/20 1152]   BP Temp Temp Source Pulse Resp SpO2 Height Weight   (!) 149/95 97.5 °F (36.4 °C) Oral 89 18 94 % 5' 7\" (1.702 m) 146 lb (66.2 kg) Physical Exam  Vitals signs and nursing note reviewed. Constitutional:       General: He is not in acute distress. Appearance: Normal appearance. He is well-developed. He is not diaphoretic. HENT:      Head: Normocephalic and atraumatic. Right Ear: External ear normal.      Left Ear: External ear normal.   Eyes:      General: No scleral icterus. Right eye: No discharge. Left eye: No discharge. Conjunctiva/sclera: Conjunctivae normal.   Neck:      Musculoskeletal: Normal range of motion. Vascular: No JVD. Cardiovascular:      Rate and Rhythm: Normal rate and regular rhythm. Heart sounds: No murmur. No friction rub. No gallop. Pulmonary:      Effort: Pulmonary effort is normal. No accessory muscle usage or respiratory distress. Breath sounds: Normal breath sounds. No wheezing, rhonchi or rales. Abdominal:      General: Abdomen is flat. Bowel sounds are decreased. There is no distension. Palpations: Abdomen is soft. Abdomen is not rigid. There is no mass. Tenderness: There is abdominal tenderness in the right lower quadrant and left lower quadrant. There is no right CVA tenderness, left CVA tenderness, guarding or rebound. Positive signs include McBurney's sign. Negative signs include Martin's sign. Skin:     General: Skin is warm and dry. Neurological:      Mental Status: He is alert and oriented to person, place, and time. GCS: GCS eye subscore is 4. GCS verbal subscore is 5. GCS motor subscore is 6. Cranial Nerves: No cranial nerve deficit. Sensory: No sensory deficit.       Coordination: Coordination normal.   Psychiatric:         Behavior: Behavior normal.         DIAGNOSTIC RESULTS   LABS:    Labs Reviewed   CBC WITH AUTO DIFFERENTIAL - Abnormal; Notable for the following components:       Result Value    WBC 3.8 (*)     RBC 4.18 (*)     MCH 34.1 (*)     Neutrophils Absolute 1.6 (*)     All other components within normal 126/76   Pulse:       Resp:       Temp:       TempSrc:       SpO2: 97% 95% 96% 91%   Weight:       Height:           Patient was given the following medications:  Medications   0.9 % sodium chloride bolus (0 mLs Intravenous Stopped 6/9/20 1400)   morphine injection 4 mg (4 mg Intravenous Given 6/9/20 1251)   ondansetron (ZOFRAN) injection 4 mg (4 mg Intravenous Given 6/9/20 1251)   iopamidol (ISOVUE-370) 76 % injection 75 mL (75 mLs Intravenous Given 6/9/20 1302)       The patient's detailed history of present illness is documented as above. Upon arrival to the emergency department the patient's vital signs are as documented. The patient is noted to be hemodynamically stable and afebrile. Physical examination findings are as above. IV access was obtained. Laboratory testing and work-up was initiated. Patient CBC demonstrates evidence of leukopenia. He has an absolute neutrophil count of 1.6. He has had the leukopenia in the past.  Today he has no evidence of anemia and/or thrombocytopenia. Patient's BUN is 11 creatinine is 0.8 nonfasting glucose 199. Sodium 134 and chloride 97 which would have been addressed with the saline as above. He does have a CO2 of 20. He does have a mild gap and does report that he uses alcohol on a regular basis and this likely is the cause of his mild metabolic acidosis. Lipase is mildly elevated at 79 and UA demonstrates no evidence of infection. CT the abdomen and pelvis with IV contrast is completed and demonstrates no evidence of acute intra-abdominal or intrapelvic pathology. Lengthy discussion took place with the patient in regards the above-mentioned. He is had problems with abdominal pain in the past.  He states he really would like to go home. I do believe that he can be managed with close follow-up on an outpatient basis with a primary care physician as well as gastroenterology but he is also been given strict potential instructions for return.  The patient has been

## 2020-06-09 NOTE — ED NOTES
Pt d/c instructions given, v/u. IV removed without complications. A&O with no signs of distress. Wheel chair offered, pt declined. Denies needs at this time. Pt ambulated to exit.         Dasha Arambula RN  06/09/20 [id number]

## 2020-06-10 PROBLEM — F10.930 ALCOHOL WITHDRAWAL, UNCOMPLICATED (HCC): Status: ACTIVE | Noted: 2020-06-10

## 2020-06-10 LAB
A/G RATIO: 1.2 (ref 1.1–2.2)
ALBUMIN SERPL-MCNC: 4.4 G/DL (ref 3.4–5)
ALP BLD-CCNC: 81 U/L (ref 40–129)
ALT SERPL-CCNC: 31 U/L (ref 10–40)
ANION GAP SERPL CALCULATED.3IONS-SCNC: 14 MMOL/L (ref 3–16)
AST SERPL-CCNC: 64 U/L (ref 15–37)
BASOPHILS ABSOLUTE: 0.1 K/UL (ref 0–0.2)
BASOPHILS RELATIVE PERCENT: 1.4 %
BILIRUB SERPL-MCNC: 0.6 MG/DL (ref 0–1)
BUN BLDV-MCNC: 9 MG/DL (ref 7–20)
CALCIUM SERPL-MCNC: 8.7 MG/DL (ref 8.3–10.6)
CHLORIDE BLD-SCNC: 97 MMOL/L (ref 99–110)
CO2: 25 MMOL/L (ref 21–32)
CREAT SERPL-MCNC: 0.7 MG/DL (ref 0.9–1.3)
EOSINOPHILS ABSOLUTE: 0 K/UL (ref 0–0.6)
EOSINOPHILS RELATIVE PERCENT: 1 %
ETHANOL: 101 MG/DL (ref 0–0.08)
GFR AFRICAN AMERICAN: >60
GFR NON-AFRICAN AMERICAN: >60
GLOBULIN: 3.8 G/DL
GLUCOSE BLD-MCNC: 130 MG/DL (ref 70–99)
HCT VFR BLD CALC: 40.8 % (ref 40.5–52.5)
HEMOGLOBIN: 14.2 G/DL (ref 13.5–17.5)
LYMPHOCYTES ABSOLUTE: 0.7 K/UL (ref 1–5.1)
LYMPHOCYTES RELATIVE PERCENT: 14 %
MCH RBC QN AUTO: 33.7 PG (ref 26–34)
MCHC RBC AUTO-ENTMCNC: 34.9 G/DL (ref 31–36)
MCV RBC AUTO: 96.5 FL (ref 80–100)
MONOCYTES ABSOLUTE: 0.4 K/UL (ref 0–1.3)
MONOCYTES RELATIVE PERCENT: 8.1 %
NEUTROPHILS ABSOLUTE: 3.8 K/UL (ref 1.7–7.7)
NEUTROPHILS RELATIVE PERCENT: 75.5 %
PDW BLD-RTO: 12.9 % (ref 12.4–15.4)
PLATELET # BLD: 148 K/UL (ref 135–450)
PMV BLD AUTO: 6.9 FL (ref 5–10.5)
POTASSIUM REFLEX MAGNESIUM: 3.6 MMOL/L (ref 3.5–5.1)
RBC # BLD: 4.23 M/UL (ref 4.2–5.9)
SODIUM BLD-SCNC: 136 MMOL/L (ref 136–145)
TOTAL PROTEIN: 8.2 G/DL (ref 6.4–8.2)
WBC # BLD: 5 K/UL (ref 4–11)

## 2020-06-10 PROCEDURE — 96375 TX/PRO/DX INJ NEW DRUG ADDON: CPT

## 2020-06-10 PROCEDURE — 2580000003 HC RX 258: Performed by: EMERGENCY MEDICINE

## 2020-06-10 PROCEDURE — 6370000000 HC RX 637 (ALT 250 FOR IP): Performed by: NURSE PRACTITIONER

## 2020-06-10 PROCEDURE — 6360000002 HC RX W HCPCS

## 2020-06-10 PROCEDURE — G0480 DRUG TEST DEF 1-7 CLASSES: HCPCS

## 2020-06-10 PROCEDURE — 85025 COMPLETE CBC W/AUTO DIFF WBC: CPT

## 2020-06-10 PROCEDURE — 6360000002 HC RX W HCPCS: Performed by: NURSE PRACTITIONER

## 2020-06-10 PROCEDURE — 80053 COMPREHEN METABOLIC PANEL: CPT

## 2020-06-10 PROCEDURE — 2580000003 HC RX 258: Performed by: NURSE PRACTITIONER

## 2020-06-10 PROCEDURE — 6360000002 HC RX W HCPCS: Performed by: EMERGENCY MEDICINE

## 2020-06-10 PROCEDURE — 2500000003 HC RX 250 WO HCPCS: Performed by: NURSE PRACTITIONER

## 2020-06-10 PROCEDURE — 6370000000 HC RX 637 (ALT 250 FOR IP): Performed by: INTERNAL MEDICINE

## 2020-06-10 PROCEDURE — 6360000002 HC RX W HCPCS: Performed by: INTERNAL MEDICINE

## 2020-06-10 PROCEDURE — 96374 THER/PROPH/DIAG INJ IV PUSH: CPT

## 2020-06-10 PROCEDURE — 2060000000 HC ICU INTERMEDIATE R&B

## 2020-06-10 PROCEDURE — 2580000003 HC RX 258: Performed by: INTERNAL MEDICINE

## 2020-06-10 RX ORDER — MAGNESIUM SULFATE 1 G/100ML
1 INJECTION INTRAVENOUS PRN
Status: DISCONTINUED | OUTPATIENT
Start: 2020-06-10 | End: 2020-06-12 | Stop reason: HOSPADM

## 2020-06-10 RX ORDER — MULTIVITAMIN WITH IRON
1 TABLET ORAL DAILY
Status: DISCONTINUED | OUTPATIENT
Start: 2020-06-10 | End: 2020-06-12 | Stop reason: HOSPADM

## 2020-06-10 RX ORDER — ONDANSETRON 2 MG/ML
4 INJECTION INTRAMUSCULAR; INTRAVENOUS ONCE
Status: COMPLETED | OUTPATIENT
Start: 2020-06-10 | End: 2020-06-10

## 2020-06-10 RX ORDER — LORAZEPAM 1 MG/1
2 TABLET ORAL
Status: DISCONTINUED | OUTPATIENT
Start: 2020-06-10 | End: 2020-06-10

## 2020-06-10 RX ORDER — LORAZEPAM 1 MG/1
1 TABLET ORAL
Status: DISCONTINUED | OUTPATIENT
Start: 2020-06-10 | End: 2020-06-10

## 2020-06-10 RX ORDER — LORAZEPAM 1 MG/1
4 TABLET ORAL
Status: DISCONTINUED | OUTPATIENT
Start: 2020-06-10 | End: 2020-06-10

## 2020-06-10 RX ORDER — LORAZEPAM 1 MG/1
3 TABLET ORAL EVERY 4 HOURS PRN
Status: DISCONTINUED | OUTPATIENT
Start: 2020-06-10 | End: 2020-06-12 | Stop reason: HOSPADM

## 2020-06-10 RX ORDER — LORAZEPAM 1 MG/1
2 TABLET ORAL EVERY 4 HOURS PRN
Status: DISCONTINUED | OUTPATIENT
Start: 2020-06-10 | End: 2020-06-12 | Stop reason: HOSPADM

## 2020-06-10 RX ORDER — AMLODIPINE BESYLATE 5 MG/1
10 TABLET ORAL DAILY
Status: DISCONTINUED | OUTPATIENT
Start: 2020-06-10 | End: 2020-06-12 | Stop reason: HOSPADM

## 2020-06-10 RX ORDER — DOCUSATE SODIUM 100 MG/1
100 CAPSULE, LIQUID FILLED ORAL 2 TIMES DAILY PRN
Status: DISCONTINUED | OUTPATIENT
Start: 2020-06-10 | End: 2020-06-12 | Stop reason: HOSPADM

## 2020-06-10 RX ORDER — LORAZEPAM 2 MG/ML
2 INJECTION INTRAMUSCULAR
Status: DISCONTINUED | OUTPATIENT
Start: 2020-06-10 | End: 2020-06-10

## 2020-06-10 RX ORDER — LORAZEPAM 1 MG/1
1 TABLET ORAL EVERY 4 HOURS PRN
Status: DISCONTINUED | OUTPATIENT
Start: 2020-06-10 | End: 2020-06-12 | Stop reason: HOSPADM

## 2020-06-10 RX ORDER — POTASSIUM CHLORIDE 20 MEQ/1
40 TABLET, EXTENDED RELEASE ORAL PRN
Status: DISCONTINUED | OUTPATIENT
Start: 2020-06-10 | End: 2020-06-12 | Stop reason: HOSPADM

## 2020-06-10 RX ORDER — LORAZEPAM 2 MG/ML
INJECTION INTRAMUSCULAR
Status: COMPLETED
Start: 2020-06-10 | End: 2020-06-10

## 2020-06-10 RX ORDER — SODIUM CHLORIDE AND POTASSIUM CHLORIDE .9; .15 G/100ML; G/100ML
SOLUTION INTRAVENOUS CONTINUOUS
Status: DISPENSED | OUTPATIENT
Start: 2020-06-10 | End: 2020-06-12

## 2020-06-10 RX ORDER — LORAZEPAM 2 MG/ML
4 INJECTION INTRAMUSCULAR EVERY 4 HOURS PRN
Status: DISCONTINUED | OUTPATIENT
Start: 2020-06-10 | End: 2020-06-12 | Stop reason: HOSPADM

## 2020-06-10 RX ORDER — SODIUM CHLORIDE 0.9 % (FLUSH) 0.9 %
10 SYRINGE (ML) INJECTION PRN
Status: DISCONTINUED | OUTPATIENT
Start: 2020-06-10 | End: 2020-06-12 | Stop reason: HOSPADM

## 2020-06-10 RX ORDER — ONDANSETRON 2 MG/ML
4 INJECTION INTRAMUSCULAR; INTRAVENOUS EVERY 6 HOURS PRN
Status: DISCONTINUED | OUTPATIENT
Start: 2020-06-10 | End: 2020-06-12 | Stop reason: HOSPADM

## 2020-06-10 RX ORDER — ACETAMINOPHEN 650 MG/1
650 SUPPOSITORY RECTAL EVERY 6 HOURS PRN
Status: DISCONTINUED | OUTPATIENT
Start: 2020-06-10 | End: 2020-06-12 | Stop reason: HOSPADM

## 2020-06-10 RX ORDER — PROMETHAZINE HYDROCHLORIDE 25 MG/1
12.5 TABLET ORAL EVERY 6 HOURS PRN
Status: DISCONTINUED | OUTPATIENT
Start: 2020-06-10 | End: 2020-06-12 | Stop reason: HOSPADM

## 2020-06-10 RX ORDER — FOLIC ACID 1 MG/1
1 TABLET ORAL DAILY
Status: DISCONTINUED | OUTPATIENT
Start: 2020-06-10 | End: 2020-06-10

## 2020-06-10 RX ORDER — CHLORDIAZEPOXIDE HYDROCHLORIDE 5 MG/1
5 CAPSULE, GELATIN COATED ORAL EVERY 6 HOURS
Status: DISCONTINUED | OUTPATIENT
Start: 2020-06-10 | End: 2020-06-12 | Stop reason: HOSPADM

## 2020-06-10 RX ORDER — LORAZEPAM 2 MG/ML
4 INJECTION INTRAMUSCULAR
Status: DISCONTINUED | OUTPATIENT
Start: 2020-06-10 | End: 2020-06-10

## 2020-06-10 RX ORDER — LORAZEPAM 1 MG/1
4 TABLET ORAL EVERY 4 HOURS PRN
Status: DISCONTINUED | OUTPATIENT
Start: 2020-06-10 | End: 2020-06-12 | Stop reason: HOSPADM

## 2020-06-10 RX ORDER — LORAZEPAM 2 MG/ML
1 INJECTION INTRAMUSCULAR EVERY 4 HOURS PRN
Status: DISCONTINUED | OUTPATIENT
Start: 2020-06-10 | End: 2020-06-12 | Stop reason: HOSPADM

## 2020-06-10 RX ORDER — ACETAMINOPHEN 325 MG/1
650 TABLET ORAL EVERY 6 HOURS PRN
Status: DISCONTINUED | OUTPATIENT
Start: 2020-06-10 | End: 2020-06-12 | Stop reason: HOSPADM

## 2020-06-10 RX ORDER — LORAZEPAM 2 MG/ML
3 INJECTION INTRAMUSCULAR
Status: DISCONTINUED | OUTPATIENT
Start: 2020-06-10 | End: 2020-06-10

## 2020-06-10 RX ORDER — LORAZEPAM 2 MG/ML
2 INJECTION INTRAMUSCULAR EVERY 4 HOURS PRN
Status: DISCONTINUED | OUTPATIENT
Start: 2020-06-10 | End: 2020-06-12 | Stop reason: HOSPADM

## 2020-06-10 RX ORDER — THIAMINE MONONITRATE (VIT B1) 100 MG
100 TABLET ORAL DAILY
Status: DISCONTINUED | OUTPATIENT
Start: 2020-06-10 | End: 2020-06-10

## 2020-06-10 RX ORDER — POTASSIUM CHLORIDE 7.45 MG/ML
10 INJECTION INTRAVENOUS PRN
Status: DISCONTINUED | OUTPATIENT
Start: 2020-06-10 | End: 2020-06-12 | Stop reason: HOSPADM

## 2020-06-10 RX ORDER — LORAZEPAM 2 MG/ML
3 INJECTION INTRAMUSCULAR EVERY 4 HOURS PRN
Status: DISCONTINUED | OUTPATIENT
Start: 2020-06-10 | End: 2020-06-12 | Stop reason: HOSPADM

## 2020-06-10 RX ORDER — LORAZEPAM 1 MG/1
3 TABLET ORAL
Status: DISCONTINUED | OUTPATIENT
Start: 2020-06-10 | End: 2020-06-10

## 2020-06-10 RX ORDER — LORAZEPAM 2 MG/ML
1 INJECTION INTRAMUSCULAR
Status: DISCONTINUED | OUTPATIENT
Start: 2020-06-10 | End: 2020-06-10

## 2020-06-10 RX ORDER — FOLIC ACID 1 MG/1
1 TABLET ORAL DAILY
Status: DISCONTINUED | OUTPATIENT
Start: 2020-06-13 | End: 2020-06-12 | Stop reason: HOSPADM

## 2020-06-10 RX ORDER — 0.9 % SODIUM CHLORIDE 0.9 %
1000 INTRAVENOUS SOLUTION INTRAVENOUS ONCE
Status: COMPLETED | OUTPATIENT
Start: 2020-06-10 | End: 2020-06-10

## 2020-06-10 RX ORDER — SODIUM CHLORIDE 0.9 % (FLUSH) 0.9 %
10 SYRINGE (ML) INJECTION PRN
Status: DISCONTINUED | OUTPATIENT
Start: 2020-06-10 | End: 2020-06-10 | Stop reason: SDUPTHER

## 2020-06-10 RX ORDER — DICYCLOMINE HYDROCHLORIDE 10 MG/1
10 CAPSULE ORAL EVERY 6 HOURS PRN
Status: DISCONTINUED | OUTPATIENT
Start: 2020-06-10 | End: 2020-06-12 | Stop reason: HOSPADM

## 2020-06-10 RX ORDER — SODIUM CHLORIDE 0.9 % (FLUSH) 0.9 %
10 SYRINGE (ML) INJECTION EVERY 12 HOURS SCHEDULED
Status: DISCONTINUED | OUTPATIENT
Start: 2020-06-10 | End: 2020-06-10 | Stop reason: SDUPTHER

## 2020-06-10 RX ORDER — SODIUM CHLORIDE 0.9 % (FLUSH) 0.9 %
10 SYRINGE (ML) INJECTION EVERY 12 HOURS SCHEDULED
Status: DISCONTINUED | OUTPATIENT
Start: 2020-06-10 | End: 2020-06-12 | Stop reason: HOSPADM

## 2020-06-10 RX ORDER — THIAMINE MONONITRATE (VIT B1) 100 MG
100 TABLET ORAL DAILY
Status: DISCONTINUED | OUTPATIENT
Start: 2020-06-13 | End: 2020-06-12 | Stop reason: HOSPADM

## 2020-06-10 RX ADMIN — AMLODIPINE BESYLATE 10 MG: 5 TABLET ORAL at 08:33

## 2020-06-10 RX ADMIN — LORAZEPAM 1 MG: 1 TABLET ORAL at 12:34

## 2020-06-10 RX ADMIN — LORAZEPAM 3 MG: 2 INJECTION INTRAMUSCULAR; INTRAVENOUS at 08:23

## 2020-06-10 RX ADMIN — ONDANSETRON 4 MG: 2 INJECTION INTRAMUSCULAR; INTRAVENOUS at 01:34

## 2020-06-10 RX ADMIN — CHLORDIAZEPOXIDE HYDROCHLORIDE 5 MG: 5 CAPSULE ORAL at 23:44

## 2020-06-10 RX ADMIN — SODIUM CHLORIDE 1000 ML: 9 INJECTION, SOLUTION INTRAVENOUS at 01:34

## 2020-06-10 RX ADMIN — LORAZEPAM 1 MG: 2 INJECTION INTRAMUSCULAR at 04:11

## 2020-06-10 RX ADMIN — CHLORDIAZEPOXIDE HYDROCHLORIDE 5 MG: 5 CAPSULE ORAL at 09:48

## 2020-06-10 RX ADMIN — POTASSIUM CHLORIDE AND SODIUM CHLORIDE: 900; 150 INJECTION, SOLUTION INTRAVENOUS at 12:34

## 2020-06-10 RX ADMIN — POTASSIUM CHLORIDE AND SODIUM CHLORIDE: 900; 150 INJECTION, SOLUTION INTRAVENOUS at 19:46

## 2020-06-10 RX ADMIN — ENOXAPARIN SODIUM 40 MG: 40 INJECTION SUBCUTANEOUS at 08:33

## 2020-06-10 RX ADMIN — ONDANSETRON 4 MG: 2 INJECTION INTRAMUSCULAR; INTRAVENOUS at 07:31

## 2020-06-10 RX ADMIN — CHLORDIAZEPOXIDE HYDROCHLORIDE 5 MG: 5 CAPSULE ORAL at 17:44

## 2020-06-10 RX ADMIN — Medication 10 ML: at 08:34

## 2020-06-10 RX ADMIN — Medication 100 MG: at 05:56

## 2020-06-10 RX ADMIN — THIAMINE HYDROCHLORIDE: 100 INJECTION, SOLUTION INTRAMUSCULAR; INTRAVENOUS at 13:58

## 2020-06-10 RX ADMIN — LORAZEPAM 1 MG: 2 INJECTION INTRAMUSCULAR; INTRAVENOUS at 02:32

## 2020-06-10 RX ADMIN — POTASSIUM CHLORIDE AND SODIUM CHLORIDE: 900; 150 INJECTION, SOLUTION INTRAVENOUS at 05:56

## 2020-06-10 RX ADMIN — THERA TABS 1 TABLET: TAB at 05:56

## 2020-06-10 RX ADMIN — LORAZEPAM 1 MG: 2 INJECTION INTRAMUSCULAR; INTRAVENOUS at 04:11

## 2020-06-10 RX ADMIN — FOLIC ACID 1 MG: 1 TABLET ORAL at 05:56

## 2020-06-10 ASSESSMENT — PAIN SCALES - GENERAL
PAINLEVEL_OUTOF10: 0

## 2020-06-10 NOTE — PROGRESS NOTES
[facility] PROGRESS NOTE    6/10/2020 7:10 AM        Name: Kenji Hair . Admitted: 6/9/2020  Primary Care Provider: No primary care provider on file. (Tel: None)      Subjective:  .     Admitted this am with ETOH withdrawal   Was just d/c in April and May for similar admission  Very mild tremor noted  He is alert and pleasant       Reviewed interval ancillary notes    Current Medications  amLODIPine (NORVASC) tablet 10 mg, Daily  dicyclomine (BENTYL) capsule 10 mg, Q6H PRN  sodium chloride flush 0.9 % injection 10 mL, 2 times per day  sodium chloride flush 0.9 % injection 10 mL, PRN  vitamin B-1 (THIAMINE) tablet 427 mg, Daily  folic acid (FOLVITE) tablet 1 mg, Daily  multivitamin 1 tablet, Daily  potassium chloride (KLOR-CON M) extended release tablet 40 mEq, PRN    Or  potassium bicarb-citric acid (EFFER-K) effervescent tablet 40 mEq, PRN    Or  potassium chloride 10 mEq/100 mL IVPB (Peripheral Line), PRN  magnesium sulfate 1 g in dextrose 5% 100 mL IVPB, PRN  acetaminophen (TYLENOL) tablet 650 mg, Q6H PRN    Or  acetaminophen (TYLENOL) suppository 650 mg, Q6H PRN  docusate sodium (COLACE) capsule 100 mg, BID PRN  promethazine (PHENERGAN) tablet 12.5 mg, Q6H PRN    Or  ondansetron (ZOFRAN) injection 4 mg, Q6H PRN  enoxaparin (LOVENOX) injection 40 mg, Daily  LORazepam (ATIVAN) tablet 1 mg, Q4H PRN    Or  LORazepam (ATIVAN) injection 1 mg, Q4H PRN    Or  LORazepam (ATIVAN) tablet 2 mg, Q4H PRN    Or  LORazepam (ATIVAN) injection 2 mg, Q4H PRN    Or  LORazepam (ATIVAN) tablet 3 mg, Q4H PRN    Or  LORazepam (ATIVAN) injection 3 mg, Q4H PRN    Or  LORazepam (ATIVAN) tablet 4 mg, Q4H PRN    Or  LORazepam (ATIVAN) injection 4 mg, Q4H PRN  0.9% NaCl with KCl 20 mEq infusion, Continuous        Objective:  /84   Pulse 85   Temp 98.2 °F (36.8 °C) (Oral)   Resp 14   Ht 5' 7\" (1.702 m)   Wt 148 lb (67.1 kg)   SpO2

## 2020-06-10 NOTE — ED NOTES
Nursing report called to  HUBER Chapman on receiving unit. RN accepts patient and has no further questions.      Oumar Reeder RN  06/10/20 [id number]

## 2020-06-10 NOTE — ED PROVIDER NOTES
Right Ear: External ear normal.      Left Ear: External ear normal.      Nose: Nose normal.      Mouth/Throat:      Mouth: Mucous membranes are moist.   Eyes:      General: No scleral icterus. Right eye: No discharge. Left eye: No discharge. Comments: Conjunctival injection   Neck:      Musculoskeletal: Neck supple. Cardiovascular:      Rate and Rhythm: Normal rate and regular rhythm. Heart sounds: Normal heart sounds. Pulmonary:      Effort: Pulmonary effort is normal. No respiratory distress. Breath sounds: Normal breath sounds. No wheezing or rales. Abdominal:      General: Bowel sounds are normal. There is no distension. Palpations: Abdomen is soft. Tenderness: There is no abdominal tenderness. There is no guarding or rebound. Comments: Patient is heaving and does vomit nonbloody emesis. Skin:     Coloration: Skin is not pale. Neurological:      Mental Status: He is alert. Comments: Tongue fasciculations and flapping tremors. Resting tremors which are worsened with movement.    Psychiatric:         Mood and Affect: Mood normal.         Behavior: Behavior normal.           DIAGNOSTIC RESULTS     EKG: All EKG's are interpreted by the Emergency Department Physician who either signs or Co-signs this chart in the absence of a cardiologist.    12 lead EKG shows     RADIOLOGY:   Non-plain film images such as CT, Ultrasound and MRI are read by the radiologist. Plain radiographic images are visualized and preliminarily interpreted by the emergency physician with the below findings:        Interpretation per the Radiologist below, if available at the time of this note:    No orders to display         ED BEDSIDE ULTRASOUND:   Performed by ED Physician - none    LABS:  Labs Reviewed   CBC WITH AUTO DIFFERENTIAL - Abnormal; Notable for the following components:       Result Value    Lymphocytes Absolute 0.7 (*)     All other components within normal limits

## 2020-06-11 ENCOUNTER — ANESTHESIA EVENT (OUTPATIENT)
Dept: ENDOSCOPY | Age: 45
End: 2020-06-11
Payer: MEDICAID

## 2020-06-11 LAB
A/G RATIO: 1.1 (ref 1.1–2.2)
ALBUMIN SERPL-MCNC: 3.9 G/DL (ref 3.4–5)
ALP BLD-CCNC: 69 U/L (ref 40–129)
ALT SERPL-CCNC: 30 U/L (ref 10–40)
ANION GAP SERPL CALCULATED.3IONS-SCNC: 10 MMOL/L (ref 3–16)
AST SERPL-CCNC: 55 U/L (ref 15–37)
BASOPHILS ABSOLUTE: 0 K/UL (ref 0–0.2)
BASOPHILS RELATIVE PERCENT: 1.1 %
BILIRUB SERPL-MCNC: 0.9 MG/DL (ref 0–1)
BUN BLDV-MCNC: 5 MG/DL (ref 7–20)
CALCIUM SERPL-MCNC: 9.2 MG/DL (ref 8.3–10.6)
CHLORIDE BLD-SCNC: 99 MMOL/L (ref 99–110)
CO2: 25 MMOL/L (ref 21–32)
CREAT SERPL-MCNC: 0.7 MG/DL (ref 0.9–1.3)
EOSINOPHILS ABSOLUTE: 0.2 K/UL (ref 0–0.6)
EOSINOPHILS RELATIVE PERCENT: 5.2 %
GFR AFRICAN AMERICAN: >60
GFR NON-AFRICAN AMERICAN: >60
GLOBULIN: 3.7 G/DL
GLUCOSE BLD-MCNC: 119 MG/DL (ref 70–99)
HCT VFR BLD CALC: 40.1 % (ref 40.5–52.5)
HEMOGLOBIN: 14 G/DL (ref 13.5–17.5)
LYMPHOCYTES ABSOLUTE: 0.6 K/UL (ref 1–5.1)
LYMPHOCYTES RELATIVE PERCENT: 18.9 %
MAGNESIUM: 1.8 MG/DL (ref 1.8–2.4)
MCH RBC QN AUTO: 34.1 PG (ref 26–34)
MCHC RBC AUTO-ENTMCNC: 35 G/DL (ref 31–36)
MCV RBC AUTO: 97.5 FL (ref 80–100)
MONOCYTES ABSOLUTE: 0.4 K/UL (ref 0–1.3)
MONOCYTES RELATIVE PERCENT: 12.8 %
NEUTROPHILS ABSOLUTE: 2 K/UL (ref 1.7–7.7)
NEUTROPHILS RELATIVE PERCENT: 62 %
PDW BLD-RTO: 12.9 % (ref 12.4–15.4)
PHOSPHORUS: 2.2 MG/DL (ref 2.5–4.9)
PLATELET # BLD: 130 K/UL (ref 135–450)
PMV BLD AUTO: 7.2 FL (ref 5–10.5)
POTASSIUM SERPL-SCNC: 3.9 MMOL/L (ref 3.5–5.1)
RBC # BLD: 4.12 M/UL (ref 4.2–5.9)
SODIUM BLD-SCNC: 134 MMOL/L (ref 136–145)
TOTAL PROTEIN: 7.6 G/DL (ref 6.4–8.2)
WBC # BLD: 3.2 K/UL (ref 4–11)

## 2020-06-11 PROCEDURE — 2580000003 HC RX 258: Performed by: NURSE PRACTITIONER

## 2020-06-11 PROCEDURE — 6360000002 HC RX W HCPCS: Performed by: NURSE PRACTITIONER

## 2020-06-11 PROCEDURE — 86705 HEP B CORE ANTIBODY IGM: CPT

## 2020-06-11 PROCEDURE — 36415 COLL VENOUS BLD VENIPUNCTURE: CPT

## 2020-06-11 PROCEDURE — 85025 COMPLETE CBC W/AUTO DIFF WBC: CPT

## 2020-06-11 PROCEDURE — 80053 COMPREHEN METABOLIC PANEL: CPT

## 2020-06-11 PROCEDURE — 86704 HEP B CORE ANTIBODY TOTAL: CPT

## 2020-06-11 PROCEDURE — 2060000000 HC ICU INTERMEDIATE R&B

## 2020-06-11 PROCEDURE — 87340 HEPATITIS B SURFACE AG IA: CPT

## 2020-06-11 PROCEDURE — 6370000000 HC RX 637 (ALT 250 FOR IP): Performed by: NURSE PRACTITIONER

## 2020-06-11 PROCEDURE — 6370000000 HC RX 637 (ALT 250 FOR IP): Performed by: PHYSICIAN ASSISTANT

## 2020-06-11 PROCEDURE — 83735 ASSAY OF MAGNESIUM: CPT

## 2020-06-11 PROCEDURE — 86803 HEPATITIS C AB TEST: CPT

## 2020-06-11 PROCEDURE — 2500000003 HC RX 250 WO HCPCS: Performed by: NURSE PRACTITIONER

## 2020-06-11 PROCEDURE — 6360000002 HC RX W HCPCS: Performed by: INTERNAL MEDICINE

## 2020-06-11 PROCEDURE — 6370000000 HC RX 637 (ALT 250 FOR IP): Performed by: INTERNAL MEDICINE

## 2020-06-11 PROCEDURE — 84100 ASSAY OF PHOSPHORUS: CPT

## 2020-06-11 RX ORDER — PANTOPRAZOLE SODIUM 40 MG/1
40 TABLET, DELAYED RELEASE ORAL
Status: DISCONTINUED | OUTPATIENT
Start: 2020-06-11 | End: 2020-06-12 | Stop reason: HOSPADM

## 2020-06-11 RX ORDER — FAMOTIDINE 20 MG/1
20 TABLET, FILM COATED ORAL 2 TIMES DAILY
Status: ON HOLD | COMMUNITY
Start: 2020-05-21 | End: 2020-06-12 | Stop reason: HOSPADM

## 2020-06-11 RX ORDER — FOLIC ACID 1 MG/1
1 TABLET ORAL DAILY
Status: ON HOLD | COMMUNITY
Start: 2020-05-22 | End: 2020-06-12 | Stop reason: SDUPTHER

## 2020-06-11 RX ORDER — MULTIVITAMIN WITH IRON
1 TABLET ORAL DAILY
COMMUNITY
Start: 2020-05-22

## 2020-06-11 RX ADMIN — PANTOPRAZOLE SODIUM 40 MG: 40 TABLET, DELAYED RELEASE ORAL at 16:35

## 2020-06-11 RX ADMIN — CHLORDIAZEPOXIDE HYDROCHLORIDE 5 MG: 5 CAPSULE ORAL at 05:51

## 2020-06-11 RX ADMIN — POTASSIUM CHLORIDE AND SODIUM CHLORIDE: 900; 150 INJECTION, SOLUTION INTRAVENOUS at 12:02

## 2020-06-11 RX ADMIN — AMLODIPINE BESYLATE 10 MG: 5 TABLET ORAL at 09:55

## 2020-06-11 RX ADMIN — POTASSIUM CHLORIDE AND SODIUM CHLORIDE: 900; 150 INJECTION, SOLUTION INTRAVENOUS at 20:26

## 2020-06-11 RX ADMIN — THIAMINE HYDROCHLORIDE: 100 INJECTION, SOLUTION INTRAMUSCULAR; INTRAVENOUS at 09:59

## 2020-06-11 RX ADMIN — SODIUM PHOSPHATE, MONOBASIC, MONOHYDRATE 10.74 MMOL: 276; 142 INJECTION, SOLUTION INTRAVENOUS at 12:01

## 2020-06-11 RX ADMIN — DICYCLOMINE HYDROCHLORIDE 10 MG: 10 CAPSULE ORAL at 11:57

## 2020-06-11 RX ADMIN — LORAZEPAM 2 MG: 1 TABLET ORAL at 12:19

## 2020-06-11 RX ADMIN — CHLORDIAZEPOXIDE HYDROCHLORIDE 5 MG: 5 CAPSULE ORAL at 16:35

## 2020-06-11 RX ADMIN — ENOXAPARIN SODIUM 40 MG: 40 INJECTION SUBCUTANEOUS at 09:57

## 2020-06-11 RX ADMIN — CHLORDIAZEPOXIDE HYDROCHLORIDE 5 MG: 5 CAPSULE ORAL at 11:57

## 2020-06-11 RX ADMIN — ONDANSETRON 4 MG: 2 INJECTION INTRAMUSCULAR; INTRAVENOUS at 11:57

## 2020-06-11 RX ADMIN — THERA TABS 1 TABLET: TAB at 09:56

## 2020-06-11 ASSESSMENT — PAIN SCALES - GENERAL
PAINLEVEL_OUTOF10: 0

## 2020-06-11 NOTE — CONSULTS
Gastroenterology Consult Note      Patient: Zulema Kessler  : 1975  Acct#:      Date:  2020    Subjective:       History of Present Illness  Patient is a 38 y.o.  male admitted with Alcohol withdrawal, uncomplicated ([facility] [S57.144]  Alcohol withdrawal, uncomplicated ([facility] [O27.319] who is seen in consult for vomiting, weight loss. He has had 2 prior admissions in the past 2 months related to vomiting. He came to the ED this time for tremors and dry heaves. He was admitted for alcohol withdrawal. Pt EtOH level was 0.1. Pt states that he doesn't drink alcohol anymore (since April admission) although he describes drinking an African malt beverage. Sometimes he gets tremors. He will spit after eating and drinking some times. Maybe feels as if he has acid reflux. Occasionally has vomiting. Denies abdominal pain to me. Reports weight loss (85 kg to 67 kg). Stool sometimes dark. Past Medical History:   Diagnosis Date    Alcohol abuse with physiological dependence ([facility]     HTN (hypertension)       History reviewed. No pertinent surgical history. Past Endoscopic History: none     Admission Meds  No current facility-administered medications on file prior to encounter. Current Outpatient Medications on File Prior to Encounter   Medication Sig Dispense Refill    ondansetron (ZOFRAN ODT) 4 MG disintegrating tablet Take 1 tablet by mouth every 8 hours as needed for Nausea 20 tablet 0    dicyclomine (BENTYL) 10 MG capsule Take 1 capsule by mouth every 6 hours as needed (cramps) 20 capsule 0    amLODIPine (NORVASC) 10 MG tablet Take 1 tablet by mouth daily 30 tablet 3        Allergies  No Known Allergies   Social   Social History     Tobacco Use    Smoking status: Former Smoker    Smokeless tobacco: Never Used    Tobacco comment: one a day    Substance Use Topics    Alcohol use:  Yes     Alcohol/week: 2.0 standard drinks     Types: 2 Cans of beer per week     Comment: last 72 hours. No results for input(s): PTT in the last 72 hours. No results for input(s): OCCULTBLD in the last 72 hours. Imaging Studies:                 CT-scan of abdomen and pelvis w IV contrast 6/9/20      Impression   No acute abdominopelvic abnormality.                          Assessment:     Active Problems:    Alcohol withdrawal, uncomplicated (HCC)  Resolved Problems:    * No resolved hospital problems. *    Vomiting - sounds like sometimes pt has acid reflux and other times pt vomits. CT unrevealing. H/o elevated transaminases- noted in April and improved since. Could have been related to alcohol as AST>ALT. Will check labs for Hepatitis B and C as chronic Hep B or C could cause mild elevations of transaminases. Elevated BAL     Recommendations:   - trial of PPI  - check magnesium level  - NPO after midnight  - plan EGD tomorrow     Discussed with Dr. Pat Collier, [address] [place] [address]    I have personally performed a face to face diagnostic evaluation on this patient. I have interviewed and examined the patient and I agree with the findings and recommended plan of care. In summary, my findings and plan are the following: emesis, abd soft, ast>alt likely etoh, ct neg, plan ppi, check mg, egd in am.  Pt agrees.        Hernan Hernandez MD  [address] and [address]

## 2020-06-11 NOTE — PROGRESS NOTES
[facility] PROGRESS NOTE    6/11/2020 2:16 PM        Name: Telly Taylor . Admitted: 6/9/2020  Primary Care Provider: No primary care provider on file. (Tel: None)      Subjective:  . Admitted this am with ETOH withdrawal   However he denies any etoh use    Seen this am   We had detailed conversation  He reports vomiting and weight loss from 85 kg to 67 kg  During our conversation he jumped out of bed to vomit pale orange liquid in the sink.   ( had previously drank OJ)    No current tremor      Reviewed interval ancillary notes    Current Medications  sodium phosphate 10.74 mmol in dextrose 5 % 250 mL IVPB, PRN    Or  sodium phosphate 21.48 mmol in dextrose 5 % 250 mL IVPB, PRN  pantoprazole (PROTONIX) tablet 40 mg, QAM AC  amLODIPine (NORVASC) tablet 10 mg, Daily  dicyclomine (BENTYL) capsule 10 mg, Q6H PRN  sodium chloride flush 0.9 % injection 10 mL, 2 times per day  sodium chloride flush 0.9 % injection 10 mL, PRN  multivitamin 1 tablet, Daily  potassium chloride (KLOR-CON M) extended release tablet 40 mEq, PRN    Or  potassium bicarb-citric acid (EFFER-K) effervescent tablet 40 mEq, PRN    Or  potassium chloride 10 mEq/100 mL IVPB (Peripheral Line), PRN  magnesium sulfate 1 g in dextrose 5% 100 mL IVPB, PRN  acetaminophen (TYLENOL) tablet 650 mg, Q6H PRN    Or  acetaminophen (TYLENOL) suppository 650 mg, Q6H PRN  docusate sodium (COLACE) capsule 100 mg, BID PRN  promethazine (PHENERGAN) tablet 12.5 mg, Q6H PRN    Or  ondansetron (ZOFRAN) injection 4 mg, Q6H PRN  enoxaparin (LOVENOX) injection 40 mg, Daily  LORazepam (ATIVAN) tablet 1 mg, Q4H PRN    Or  LORazepam (ATIVAN) injection 1 mg, Q4H PRN    Or  LORazepam (ATIVAN) tablet 2 mg, Q4H PRN    Or  LORazepam (ATIVAN) injection 2 mg, Q4H PRN    Or  LORazepam (ATIVAN) tablet 3 mg, Q4H PRN    Or  LORazepam (ATIVAN) injection 3 mg, Q4H PRN    Or  LORazepam (ATIVAN)

## 2020-06-12 ENCOUNTER — ANESTHESIA (OUTPATIENT)
Dept: ENDOSCOPY | Age: 45
End: 2020-06-12
Payer: MEDICAID

## 2020-06-12 VITALS
RESPIRATION RATE: 18 BRPM | OXYGEN SATURATION: 99 % | SYSTOLIC BLOOD PRESSURE: 125 MMHG | DIASTOLIC BLOOD PRESSURE: 91 MMHG

## 2020-06-12 VITALS
DIASTOLIC BLOOD PRESSURE: 92 MMHG | WEIGHT: 146.2 LBS | BODY MASS INDEX: 22.95 KG/M2 | SYSTOLIC BLOOD PRESSURE: 134 MMHG | HEART RATE: 83 BPM | OXYGEN SATURATION: 99 % | RESPIRATION RATE: 18 BRPM | HEIGHT: 67 IN | TEMPERATURE: 97.5 F

## 2020-06-12 LAB
ALBUMIN SERPL-MCNC: 3.7 G/DL (ref 3.4–5)
ANION GAP SERPL CALCULATED.3IONS-SCNC: 9 MMOL/L (ref 3–16)
BASOPHILS ABSOLUTE: 0 K/UL (ref 0–0.2)
BASOPHILS RELATIVE PERCENT: 1.2 %
BUN BLDV-MCNC: 5 MG/DL (ref 7–20)
CALCIUM SERPL-MCNC: 9.4 MG/DL (ref 8.3–10.6)
CHLORIDE BLD-SCNC: 102 MMOL/L (ref 99–110)
CO2: 25 MMOL/L (ref 21–32)
CREAT SERPL-MCNC: 0.7 MG/DL (ref 0.9–1.3)
EOSINOPHILS ABSOLUTE: 0.2 K/UL (ref 0–0.6)
EOSINOPHILS RELATIVE PERCENT: 7 %
GFR AFRICAN AMERICAN: >60
GFR NON-AFRICAN AMERICAN: >60
GLUCOSE BLD-MCNC: 97 MG/DL (ref 70–99)
HCT VFR BLD CALC: 39.2 % (ref 40.5–52.5)
HEMOGLOBIN: 13.6 G/DL (ref 13.5–17.5)
HEPATITIS B CORE IGM ANTIBODY: NORMAL
HEPATITIS B SURFACE ANTIGEN INTERPRETATION: NORMAL
HEPATITIS C ANTIBODY INTERPRETATION: NORMAL
LYMPHOCYTES ABSOLUTE: 0.6 K/UL (ref 1–5.1)
LYMPHOCYTES RELATIVE PERCENT: 21.9 %
MCH RBC QN AUTO: 33.9 PG (ref 26–34)
MCHC RBC AUTO-ENTMCNC: 34.6 G/DL (ref 31–36)
MCV RBC AUTO: 98.1 FL (ref 80–100)
MONOCYTES ABSOLUTE: 0.4 K/UL (ref 0–1.3)
MONOCYTES RELATIVE PERCENT: 13.1 %
NEUTROPHILS ABSOLUTE: 1.6 K/UL (ref 1.7–7.7)
NEUTROPHILS RELATIVE PERCENT: 56.8 %
PDW BLD-RTO: 12.7 % (ref 12.4–15.4)
PHOSPHORUS: 3.2 MG/DL (ref 2.5–4.9)
PLATELET # BLD: 121 K/UL (ref 135–450)
PMV BLD AUTO: 7.2 FL (ref 5–10.5)
POTASSIUM SERPL-SCNC: 3.9 MMOL/L (ref 3.5–5.1)
RBC # BLD: 4 M/UL (ref 4.2–5.9)
SODIUM BLD-SCNC: 136 MMOL/L (ref 136–145)
WBC # BLD: 2.8 K/UL (ref 4–11)

## 2020-06-12 PROCEDURE — 6370000000 HC RX 637 (ALT 250 FOR IP): Performed by: NURSE PRACTITIONER

## 2020-06-12 PROCEDURE — 36415 COLL VENOUS BLD VENIPUNCTURE: CPT

## 2020-06-12 PROCEDURE — 7100000000 HC PACU RECOVERY - FIRST 15 MIN: Performed by: INTERNAL MEDICINE

## 2020-06-12 PROCEDURE — 88305 TISSUE EXAM BY PATHOLOGIST: CPT

## 2020-06-12 PROCEDURE — 2580000003 HC RX 258: Performed by: INTERNAL MEDICINE

## 2020-06-12 PROCEDURE — 2500000003 HC RX 250 WO HCPCS: Performed by: NURSE PRACTITIONER

## 2020-06-12 PROCEDURE — 7100000001 HC PACU RECOVERY - ADDTL 15 MIN: Performed by: INTERNAL MEDICINE

## 2020-06-12 PROCEDURE — 0DB68ZX EXCISION OF STOMACH, VIA NATURAL OR ARTIFICIAL OPENING ENDOSCOPIC, DIAGNOSTIC: ICD-10-PCS | Performed by: INTERNAL MEDICINE

## 2020-06-12 PROCEDURE — 6360000002 HC RX W HCPCS: Performed by: NURSE PRACTITIONER

## 2020-06-12 PROCEDURE — 3700000000 HC ANESTHESIA ATTENDED CARE: Performed by: INTERNAL MEDICINE

## 2020-06-12 PROCEDURE — 2580000003 HC RX 258: Performed by: NURSE ANESTHETIST, CERTIFIED REGISTERED

## 2020-06-12 PROCEDURE — 2709999900 HC NON-CHARGEABLE SUPPLY: Performed by: INTERNAL MEDICINE

## 2020-06-12 PROCEDURE — 85025 COMPLETE CBC W/AUTO DIFF WBC: CPT

## 2020-06-12 PROCEDURE — 6360000002 HC RX W HCPCS: Performed by: INTERNAL MEDICINE

## 2020-06-12 PROCEDURE — 80069 RENAL FUNCTION PANEL: CPT

## 2020-06-12 PROCEDURE — 2580000003 HC RX 258: Performed by: NURSE PRACTITIONER

## 2020-06-12 PROCEDURE — 6370000000 HC RX 637 (ALT 250 FOR IP): Performed by: INTERNAL MEDICINE

## 2020-06-12 PROCEDURE — 6370000000 HC RX 637 (ALT 250 FOR IP): Performed by: PHYSICIAN ASSISTANT

## 2020-06-12 PROCEDURE — 3700000001 HC ADD 15 MINUTES (ANESTHESIA): Performed by: INTERNAL MEDICINE

## 2020-06-12 PROCEDURE — 2580000003 HC RX 258: Performed by: ANESTHESIOLOGY

## 2020-06-12 PROCEDURE — 6360000002 HC RX W HCPCS: Performed by: NURSE ANESTHETIST, CERTIFIED REGISTERED

## 2020-06-12 PROCEDURE — 3609012400 HC EGD TRANSORAL BIOPSY SINGLE/MULTIPLE: Performed by: INTERNAL MEDICINE

## 2020-06-12 RX ORDER — AMLODIPINE BESYLATE 10 MG/1
10 TABLET ORAL DAILY
Qty: 30 TABLET | Refills: 3 | Status: SHIPPED | OUTPATIENT
Start: 2020-06-12

## 2020-06-12 RX ORDER — MIDAZOLAM HYDROCHLORIDE 1 MG/ML
INJECTION INTRAMUSCULAR; INTRAVENOUS PRN
Status: DISCONTINUED | OUTPATIENT
Start: 2020-06-12 | End: 2020-06-12 | Stop reason: SDUPTHER

## 2020-06-12 RX ORDER — SODIUM CHLORIDE 9 MG/ML
INJECTION, SOLUTION INTRAVENOUS CONTINUOUS PRN
Status: DISCONTINUED | OUTPATIENT
Start: 2020-06-12 | End: 2020-06-12 | Stop reason: SDUPTHER

## 2020-06-12 RX ORDER — PROPOFOL 10 MG/ML
INJECTION, EMULSION INTRAVENOUS CONTINUOUS PRN
Status: DISCONTINUED | OUTPATIENT
Start: 2020-06-12 | End: 2020-06-12 | Stop reason: SDUPTHER

## 2020-06-12 RX ORDER — FOLIC ACID 1 MG/1
1 TABLET ORAL DAILY
Qty: 30 TABLET | Refills: 3 | Status: SHIPPED | OUTPATIENT
Start: 2020-06-12

## 2020-06-12 RX ORDER — PROPOFOL 10 MG/ML
INJECTION, EMULSION INTRAVENOUS PRN
Status: DISCONTINUED | OUTPATIENT
Start: 2020-06-12 | End: 2020-06-12 | Stop reason: SDUPTHER

## 2020-06-12 RX ORDER — SODIUM CHLORIDE AND POTASSIUM CHLORIDE .9; .15 G/100ML; G/100ML
SOLUTION INTRAVENOUS
Status: DISCONTINUED
Start: 2020-06-12 | End: 2020-06-12 | Stop reason: HOSPADM

## 2020-06-12 RX ORDER — LANOLIN ALCOHOL/MO/W.PET/CERES
100 CREAM (GRAM) TOPICAL DAILY
Qty: 30 TABLET | Refills: 3 | Status: SHIPPED | OUTPATIENT
Start: 2020-06-13

## 2020-06-12 RX ORDER — SODIUM CHLORIDE 9 MG/ML
INJECTION, SOLUTION INTRAVENOUS CONTINUOUS
Status: DISCONTINUED | OUTPATIENT
Start: 2020-06-12 | End: 2020-06-12 | Stop reason: HOSPADM

## 2020-06-12 RX ORDER — PANTOPRAZOLE SODIUM 40 MG/1
40 TABLET, DELAYED RELEASE ORAL
Qty: 30 TABLET | Refills: 3 | Status: SHIPPED | OUTPATIENT
Start: 2020-06-13

## 2020-06-12 RX ADMIN — PROPOFOL 100 MG: 10 INJECTION, EMULSION INTRAVENOUS at 08:54

## 2020-06-12 RX ADMIN — CHLORDIAZEPOXIDE HYDROCHLORIDE 5 MG: 5 CAPSULE ORAL at 11:07

## 2020-06-12 RX ADMIN — THERA TABS 1 TABLET: TAB at 10:09

## 2020-06-12 RX ADMIN — SODIUM CHLORIDE: 9 INJECTION, SOLUTION INTRAVENOUS at 08:05

## 2020-06-12 RX ADMIN — CHLORDIAZEPOXIDE HYDROCHLORIDE 5 MG: 5 CAPSULE ORAL at 01:00

## 2020-06-12 RX ADMIN — AMLODIPINE BESYLATE 10 MG: 5 TABLET ORAL at 10:09

## 2020-06-12 RX ADMIN — SODIUM CHLORIDE: 9 INJECTION, SOLUTION INTRAVENOUS at 11:03

## 2020-06-12 RX ADMIN — MIDAZOLAM 2 MG: 1 INJECTION INTRAMUSCULAR; INTRAVENOUS at 08:53

## 2020-06-12 RX ADMIN — SODIUM CHLORIDE: 9 INJECTION, SOLUTION INTRAVENOUS at 09:00

## 2020-06-12 RX ADMIN — PROPOFOL 100 MG: 10 INJECTION, EMULSION INTRAVENOUS at 09:00

## 2020-06-12 RX ADMIN — PROPOFOL 100 MG: 10 INJECTION, EMULSION INTRAVENOUS at 08:57

## 2020-06-12 RX ADMIN — PANTOPRAZOLE SODIUM 40 MG: 40 TABLET, DELAYED RELEASE ORAL at 05:52

## 2020-06-12 RX ADMIN — SODIUM CHLORIDE: 9 INJECTION, SOLUTION INTRAVENOUS at 08:45

## 2020-06-12 RX ADMIN — THIAMINE HYDROCHLORIDE: 100 INJECTION, SOLUTION INTRAMUSCULAR; INTRAVENOUS at 11:04

## 2020-06-12 RX ADMIN — Medication 10 ML: at 10:09

## 2020-06-12 RX ADMIN — CHLORDIAZEPOXIDE HYDROCHLORIDE 5 MG: 5 CAPSULE ORAL at 05:52

## 2020-06-12 RX ADMIN — PROPOFOL 180 MCG/KG/MIN: 10 INJECTION, EMULSION INTRAVENOUS at 08:53

## 2020-06-12 RX ADMIN — ENOXAPARIN SODIUM 40 MG: 40 INJECTION SUBCUTANEOUS at 10:09

## 2020-06-12 ASSESSMENT — ENCOUNTER SYMPTOMS: SHORTNESS OF BREATH: 0

## 2020-06-12 ASSESSMENT — PAIN SCALES - WONG BAKER: WONGBAKER_NUMERICALRESPONSE: 2

## 2020-06-12 ASSESSMENT — PAIN SCALES - GENERAL: PAINLEVEL_OUTOF10: 0

## 2020-06-12 ASSESSMENT — PAIN DESCRIPTION - PROGRESSION: CLINICAL_PROGRESSION: RESOLVED

## 2020-06-12 ASSESSMENT — PAIN - FUNCTIONAL ASSESSMENT: PAIN_FUNCTIONAL_ASSESSMENT: 0-10

## 2020-06-12 NOTE — PROGRESS NOTES
egd under chart review then procedures, small hh otherwise normal.   Rec:  call office 1 week for biopsy results (gastric r/o h pylori)  protonix daily for 1-2 months then can stop  stop etoh  Primary team f/u viral hep panel although ast>alt more likely etoh  resume diet  otherwise will sign off, call if needed.

## 2020-06-12 NOTE — DISCHARGE SUMMARY
ondansetron 4 MG disintegrating tablet  Commonly known as:  Zofran ODT  Take 1 tablet by mouth every 8 hours as needed for Nausea  Notes to patient:  Use: nausea and vomiting  Side effects: headache, weakness or dizziness        STOP taking these medications    dicyclomine 10 MG capsule  Commonly known as:  Bentyl     famotidine 20 MG tablet  Commonly known as:  PEPCID           Where to Get Your Medications      These medications were sent to [facility], [address] [place] [address] Drive, [address]    Phone:  [phone]   · amLODIPine 10 MG tablet  · folic acid 1 MG tablet  · pantoprazole 40 MG tablet  · thiamine 100 MG tablet         Discharge recommendations given to patient. Follow Up. in 1 week   Disposition. Home   Activity. As tolerated   Diet: Dietary Nutrition Supplements: Standard High Calorie Oral Supplement  DIET GENERAL;      Spent > 30 minutes in discharge process.     Signed:  ADAM Espinoza CNP     6/12/2020 12:49 PM

## 2020-06-12 NOTE — PLAN OF CARE
Problem: Falls - Risk of:  Goal: Will remain free from falls  Description: Will remain free from falls  Outcome: Ongoing  Note: Patient is able to ambulate to the restroom for bowel movements. Uses urinal at bedside. Denies dizziness. Free from falls this shift. Problem: Fluid Volume - Deficit:  Goal: Absence of fluid volume deficit signs and symptoms  Description: Absence of fluid volume deficit signs and symptoms  Outcome: Ongoing  Note: Patient is free from signs of dehydration. Urine is yellow and clear. Problem: Nausea/Vomiting:  Goal: Absence of nausea/vomiting  Description: Absence of nausea/vomiting  Outcome: Ongoing  Note: N/V beginning of shift. Emesis appearance of orange water. Zofran and bentyl given. Patient able to maintain appetite. Able to keep food down the rest of shift. .      Problem: Sleep Pattern Disturbance:  Goal: Appears well-rested  Description: Appears well-rested  Outcome: Ongoing  Note: Patient appears to be more rested than yesterday.

## 2020-06-13 LAB — HEPATITIS B CORE TOTAL ANTIBODY: POSITIVE

## 2020-07-20 ENCOUNTER — APPOINTMENT (OUTPATIENT)
Dept: CT IMAGING | Age: 45
End: 2020-07-20
Payer: MEDICAID

## 2020-07-20 ENCOUNTER — APPOINTMENT (OUTPATIENT)
Dept: GENERAL RADIOLOGY | Age: 45
End: 2020-07-20
Payer: MEDICAID

## 2020-07-20 ENCOUNTER — HOSPITAL ENCOUNTER (INPATIENT)
Age: 45
LOS: 6 days | Discharge: HOME | End: 2020-07-26
Attending: EMERGENCY MEDICINE | Admitting: INTERNAL MEDICINE
Payer: MEDICAID

## 2020-07-20 PROBLEM — R41.82 ALTERED MENTAL STATUS: Status: ACTIVE | Noted: 2020-07-20

## 2020-07-20 LAB
A/G RATIO: 1 (ref 1.1–2.2)
ALBUMIN SERPL-MCNC: 4.3 G/DL (ref 3.4–5)
ALP BLD-CCNC: 87 U/L (ref 40–129)
ALT SERPL-CCNC: 137 U/L (ref 10–40)
AMPHETAMINE SCREEN, URINE: NORMAL
ANION GAP SERPL CALCULATED.3IONS-SCNC: 13 MMOL/L (ref 3–16)
AST SERPL-CCNC: 136 U/L (ref 15–37)
BACTERIA: ABNORMAL /HPF
BARBITURATE SCREEN URINE: NORMAL
BASOPHILS ABSOLUTE: 0 K/UL (ref 0–0.2)
BASOPHILS RELATIVE PERCENT: 0.2 %
BENZODIAZEPINE SCREEN, URINE: NORMAL
BILIRUB SERPL-MCNC: 0.3 MG/DL (ref 0–1)
BILIRUBIN URINE: NEGATIVE
BLOOD, URINE: ABNORMAL
BUN BLDV-MCNC: 10 MG/DL (ref 7–20)
CALCIUM SERPL-MCNC: 9.6 MG/DL (ref 8.3–10.6)
CANNABINOID SCREEN URINE: NORMAL
CHLORIDE BLD-SCNC: 95 MMOL/L (ref 99–110)
CLARITY: ABNORMAL
CO2: 21 MMOL/L (ref 21–32)
COCAINE METABOLITE SCREEN URINE: NORMAL
COLOR: YELLOW
CREAT SERPL-MCNC: 0.9 MG/DL (ref 0.9–1.3)
EKG ATRIAL RATE: 99 BPM
EKG DIAGNOSIS: NORMAL
EKG P AXIS: 62 DEGREES
EKG P-R INTERVAL: 146 MS
EKG Q-T INTERVAL: 444 MS
EKG QRS DURATION: 88 MS
EKG QTC CALCULATION (BAZETT): 569 MS
EKG R AXIS: 65 DEGREES
EKG T AXIS: 69 DEGREES
EKG VENTRICULAR RATE: 99 BPM
EOSINOPHILS ABSOLUTE: 0 K/UL (ref 0–0.6)
EOSINOPHILS RELATIVE PERCENT: 0.1 %
EPITHELIAL CELLS, UA: 3 /HPF (ref 0–5)
ETHANOL: NORMAL MG/DL (ref 0–0.08)
GFR AFRICAN AMERICAN: >60
GFR NON-AFRICAN AMERICAN: >60
GLOBULIN: 4.1 G/DL
GLUCOSE BLD-MCNC: 104 MG/DL (ref 70–99)
GLUCOSE BLD-MCNC: 114 MG/DL
GLUCOSE BLD-MCNC: 114 MG/DL (ref 70–99)
GLUCOSE URINE: NEGATIVE MG/DL
HCT VFR BLD CALC: 37.8 % (ref 40.5–52.5)
HEMOGLOBIN: 11.8 G/DL (ref 13.5–17.5)
HYALINE CASTS: 7 /LPF (ref 0–8)
KETONES, URINE: NEGATIVE MG/DL
LACTIC ACID: 6.1 MMOL/L (ref 0.4–2)
LEUKOCYTE ESTERASE, URINE: ABNORMAL
LIPASE: 53 U/L (ref 13–60)
LYMPHOCYTES ABSOLUTE: 0.3 K/UL (ref 1–5.1)
LYMPHOCYTES RELATIVE PERCENT: 4.4 %
Lab: NORMAL
MCH RBC QN AUTO: 34.2 PG (ref 26–34)
MCHC RBC AUTO-ENTMCNC: 31.2 G/DL (ref 31–36)
MCV RBC AUTO: 109.5 FL (ref 80–100)
METHADONE SCREEN, URINE: NORMAL
MICROSCOPIC EXAMINATION: YES
MONOCYTES ABSOLUTE: 0.3 K/UL (ref 0–1.3)
MONOCYTES RELATIVE PERCENT: 5.3 %
NEUTROPHILS ABSOLUTE: 5.4 K/UL (ref 1.7–7.7)
NEUTROPHILS RELATIVE PERCENT: 90 %
NITRITE, URINE: NEGATIVE
OPIATE SCREEN URINE: NORMAL
OXYCODONE URINE: NORMAL
PDW BLD-RTO: 13.7 % (ref 12.4–15.4)
PERFORMED ON: ABNORMAL
PH UA: 5
PH UA: 5 (ref 5–8)
PHENCYCLIDINE SCREEN URINE: NORMAL
PLATELET # BLD: 54 K/UL (ref 135–450)
PLATELET SLIDE REVIEW: ABNORMAL
PMV BLD AUTO: 7.5 FL (ref 5–10.5)
POTASSIUM REFLEX MAGNESIUM: 4.3 MMOL/L (ref 3.5–5.1)
PROPOXYPHENE SCREEN: NORMAL
PROTEIN UA: 100 MG/DL
RBC # BLD: 3.45 M/UL (ref 4.2–5.9)
RBC UA: 8 /HPF (ref 0–4)
REASON FOR REJECTION: NORMAL
REJECTED TEST: NORMAL
SLIDE REVIEW: ABNORMAL
SODIUM BLD-SCNC: 129 MMOL/L (ref 136–145)
SPECIFIC GRAVITY UA: 1.01 (ref 1–1.03)
TOTAL PROTEIN: 8.4 G/DL (ref 6.4–8.2)
URINE REFLEX TO CULTURE: YES
URINE TYPE: ABNORMAL
UROBILINOGEN, URINE: 0.2 E.U./DL
WBC # BLD: 6 K/UL (ref 4–11)
WBC UA: 108 /HPF (ref 0–5)

## 2020-07-20 PROCEDURE — 83605 ASSAY OF LACTIC ACID: CPT

## 2020-07-20 PROCEDURE — G0480 DRUG TEST DEF 1-7 CLASSES: HCPCS

## 2020-07-20 PROCEDURE — 2060000000 HC ICU INTERMEDIATE R&B

## 2020-07-20 PROCEDURE — 80053 COMPREHEN METABOLIC PANEL: CPT

## 2020-07-20 PROCEDURE — 96375 TX/PRO/DX INJ NEW DRUG ADDON: CPT

## 2020-07-20 PROCEDURE — 1200000000 HC SEMI PRIVATE

## 2020-07-20 PROCEDURE — 81001 URINALYSIS AUTO W/SCOPE: CPT

## 2020-07-20 PROCEDURE — 80307 DRUG TEST PRSMV CHEM ANLYZR: CPT

## 2020-07-20 PROCEDURE — 96374 THER/PROPH/DIAG INJ IV PUSH: CPT

## 2020-07-20 PROCEDURE — U0003 INFECTIOUS AGENT DETECTION BY NUCLEIC ACID (DNA OR RNA); SEVERE ACUTE RESPIRATORY SYNDROME CORONAVIRUS 2 (SARS-COV-2) (CORONAVIRUS DISEASE [COVID-19]), AMPLIFIED PROBE TECHNIQUE, MAKING USE OF HIGH THROUGHPUT TECHNOLOGIES AS DESCRIBED BY CMS-2020-01-R: HCPCS

## 2020-07-20 PROCEDURE — 70450 CT HEAD/BRAIN W/O DYE: CPT

## 2020-07-20 PROCEDURE — 6360000002 HC RX W HCPCS: Performed by: NURSE PRACTITIONER

## 2020-07-20 PROCEDURE — 93005 ELECTROCARDIOGRAM TRACING: CPT | Performed by: NURSE PRACTITIONER

## 2020-07-20 PROCEDURE — 93010 ELECTROCARDIOGRAM REPORT: CPT | Performed by: INTERNAL MEDICINE

## 2020-07-20 PROCEDURE — 99285 EMERGENCY DEPT VISIT HI MDM: CPT

## 2020-07-20 PROCEDURE — 2580000003 HC RX 258: Performed by: NURSE PRACTITIONER

## 2020-07-20 PROCEDURE — 71045 X-RAY EXAM CHEST 1 VIEW: CPT

## 2020-07-20 PROCEDURE — 87086 URINE CULTURE/COLONY COUNT: CPT

## 2020-07-20 PROCEDURE — 2580000003 HC RX 258: Performed by: INTERNAL MEDICINE

## 2020-07-20 PROCEDURE — 83690 ASSAY OF LIPASE: CPT

## 2020-07-20 PROCEDURE — 6370000000 HC RX 637 (ALT 250 FOR IP): Performed by: INTERNAL MEDICINE

## 2020-07-20 PROCEDURE — 85025 COMPLETE CBC W/AUTO DIFF WBC: CPT

## 2020-07-20 RX ORDER — LORAZEPAM 1 MG/1
2 TABLET ORAL
Status: DISCONTINUED | OUTPATIENT
Start: 2020-07-20 | End: 2020-07-26 | Stop reason: HOSPADM

## 2020-07-20 RX ORDER — LORAZEPAM 1 MG/1
3 TABLET ORAL
Status: DISCONTINUED | OUTPATIENT
Start: 2020-07-20 | End: 2020-07-26 | Stop reason: HOSPADM

## 2020-07-20 RX ORDER — SODIUM CHLORIDE 0.9 % (FLUSH) 0.9 %
10 SYRINGE (ML) INJECTION EVERY 12 HOURS SCHEDULED
Status: DISCONTINUED | OUTPATIENT
Start: 2020-07-20 | End: 2020-07-26 | Stop reason: HOSPADM

## 2020-07-20 RX ORDER — SODIUM CHLORIDE 0.9 % (FLUSH) 0.9 %
10 SYRINGE (ML) INJECTION PRN
Status: DISCONTINUED | OUTPATIENT
Start: 2020-07-20 | End: 2020-07-26 | Stop reason: HOSPADM

## 2020-07-20 RX ORDER — LORAZEPAM 1 MG/1
4 TABLET ORAL
Status: DISCONTINUED | OUTPATIENT
Start: 2020-07-20 | End: 2020-07-26 | Stop reason: HOSPADM

## 2020-07-20 RX ORDER — THIAMINE MONONITRATE (VIT B1) 100 MG
100 TABLET ORAL DAILY
Status: DISCONTINUED | OUTPATIENT
Start: 2020-07-20 | End: 2020-07-26 | Stop reason: HOSPADM

## 2020-07-20 RX ORDER — POTASSIUM CHLORIDE 7.45 MG/ML
10 INJECTION INTRAVENOUS PRN
Status: DISCONTINUED | OUTPATIENT
Start: 2020-07-20 | End: 2020-07-26 | Stop reason: HOSPADM

## 2020-07-20 RX ORDER — ONDANSETRON 2 MG/ML
4 INJECTION INTRAMUSCULAR; INTRAVENOUS EVERY 30 MIN PRN
Status: DISCONTINUED | OUTPATIENT
Start: 2020-07-20 | End: 2020-07-20

## 2020-07-20 RX ORDER — MULTIVITAMIN WITH IRON
1 TABLET ORAL DAILY
Status: DISCONTINUED | OUTPATIENT
Start: 2020-07-21 | End: 2020-07-26 | Stop reason: HOSPADM

## 2020-07-20 RX ORDER — FOLIC ACID 1 MG/1
1 TABLET ORAL DAILY
Status: DISCONTINUED | OUTPATIENT
Start: 2020-07-20 | End: 2020-07-26 | Stop reason: HOSPADM

## 2020-07-20 RX ORDER — LORAZEPAM 2 MG/ML
2 INJECTION INTRAMUSCULAR
Status: DISCONTINUED | OUTPATIENT
Start: 2020-07-20 | End: 2020-07-26 | Stop reason: HOSPADM

## 2020-07-20 RX ORDER — LORAZEPAM 2 MG/ML
1 INJECTION INTRAMUSCULAR ONCE
Status: COMPLETED | OUTPATIENT
Start: 2020-07-20 | End: 2020-07-20

## 2020-07-20 RX ORDER — LORAZEPAM 2 MG/ML
3 INJECTION INTRAMUSCULAR
Status: DISCONTINUED | OUTPATIENT
Start: 2020-07-20 | End: 2020-07-26 | Stop reason: HOSPADM

## 2020-07-20 RX ORDER — 0.9 % SODIUM CHLORIDE 0.9 %
1000 INTRAVENOUS SOLUTION INTRAVENOUS ONCE
Status: COMPLETED | OUTPATIENT
Start: 2020-07-20 | End: 2020-07-20

## 2020-07-20 RX ORDER — LORAZEPAM 2 MG/ML
1 INJECTION INTRAMUSCULAR
Status: DISCONTINUED | OUTPATIENT
Start: 2020-07-20 | End: 2020-07-26 | Stop reason: HOSPADM

## 2020-07-20 RX ORDER — ACETAMINOPHEN 500 MG
1000 TABLET ORAL ONCE
Status: DISCONTINUED | OUTPATIENT
Start: 2020-07-20 | End: 2020-07-20

## 2020-07-20 RX ORDER — PROMETHAZINE HYDROCHLORIDE 25 MG/1
12.5 TABLET ORAL EVERY 6 HOURS PRN
Status: DISCONTINUED | OUTPATIENT
Start: 2020-07-20 | End: 2020-07-26 | Stop reason: HOSPADM

## 2020-07-20 RX ORDER — LORAZEPAM 2 MG/ML
4 INJECTION INTRAMUSCULAR
Status: DISCONTINUED | OUTPATIENT
Start: 2020-07-20 | End: 2020-07-26 | Stop reason: HOSPADM

## 2020-07-20 RX ORDER — SODIUM CHLORIDE 9 MG/ML
INJECTION, SOLUTION INTRAVENOUS CONTINUOUS
Status: DISCONTINUED | OUTPATIENT
Start: 2020-07-20 | End: 2020-07-22

## 2020-07-20 RX ORDER — POTASSIUM CHLORIDE 20 MEQ/1
40 TABLET, EXTENDED RELEASE ORAL PRN
Status: DISCONTINUED | OUTPATIENT
Start: 2020-07-20 | End: 2020-07-26 | Stop reason: HOSPADM

## 2020-07-20 RX ORDER — ACETAMINOPHEN 650 MG/1
650 SUPPOSITORY RECTAL EVERY 6 HOURS PRN
Status: DISCONTINUED | OUTPATIENT
Start: 2020-07-20 | End: 2020-07-26 | Stop reason: HOSPADM

## 2020-07-20 RX ORDER — ACETAMINOPHEN 325 MG/1
650 TABLET ORAL EVERY 6 HOURS PRN
Status: DISCONTINUED | OUTPATIENT
Start: 2020-07-20 | End: 2020-07-26 | Stop reason: HOSPADM

## 2020-07-20 RX ORDER — PANTOPRAZOLE SODIUM 40 MG/1
40 TABLET, DELAYED RELEASE ORAL
Status: DISCONTINUED | OUTPATIENT
Start: 2020-07-21 | End: 2020-07-26 | Stop reason: HOSPADM

## 2020-07-20 RX ORDER — LORAZEPAM 1 MG/1
1 TABLET ORAL
Status: DISCONTINUED | OUTPATIENT
Start: 2020-07-20 | End: 2020-07-26 | Stop reason: HOSPADM

## 2020-07-20 RX ORDER — AMLODIPINE BESYLATE 5 MG/1
10 TABLET ORAL DAILY
Status: DISCONTINUED | OUTPATIENT
Start: 2020-07-20 | End: 2020-07-26 | Stop reason: HOSPADM

## 2020-07-20 RX ORDER — ONDANSETRON 2 MG/ML
4 INJECTION INTRAMUSCULAR; INTRAVENOUS EVERY 6 HOURS PRN
Status: DISCONTINUED | OUTPATIENT
Start: 2020-07-20 | End: 2020-07-26 | Stop reason: HOSPADM

## 2020-07-20 RX ORDER — POLYETHYLENE GLYCOL 3350 17 G/17G
17 POWDER, FOR SOLUTION ORAL DAILY PRN
Status: DISCONTINUED | OUTPATIENT
Start: 2020-07-20 | End: 2020-07-26 | Stop reason: HOSPADM

## 2020-07-20 RX ADMIN — ONDANSETRON 4 MG: 2 INJECTION INTRAMUSCULAR; INTRAVENOUS at 11:19

## 2020-07-20 RX ADMIN — LORAZEPAM 1 MG: 2 INJECTION INTRAMUSCULAR; INTRAVENOUS at 13:26

## 2020-07-20 RX ADMIN — SODIUM CHLORIDE: 9 INJECTION, SOLUTION INTRAVENOUS at 17:48

## 2020-07-20 RX ADMIN — AMLODIPINE BESYLATE 10 MG: 5 TABLET ORAL at 17:47

## 2020-07-20 RX ADMIN — Medication 100 MG: at 17:47

## 2020-07-20 RX ADMIN — FOLIC ACID 1 MG: 1 TABLET ORAL at 17:47

## 2020-07-20 RX ADMIN — SODIUM CHLORIDE 1000 ML: 9 INJECTION, SOLUTION INTRAVENOUS at 11:19

## 2020-07-20 RX ADMIN — Medication 1 G: at 13:09

## 2020-07-20 ASSESSMENT — PAIN SCALES - GENERAL
PAINLEVEL_OUTOF10: 0
PAINLEVEL_OUTOF10: 0

## 2020-07-20 NOTE — PROGRESS NOTES
REPORT received from [facility] in ER, telepak sent , pt coming to [id number]

## 2020-07-20 NOTE — PROGRESS NOTES
Patient returned to room in ed from ct. Spoke with tevin. Patient reconnected to all monitors. Bed alert in place.

## 2020-07-20 NOTE — ED NOTES
Pharmacy Medication History Note      List of current medications patient is taking is complete. Source of information: Outpatient Pharmacy    Changes made to medication list:  Medications flagged for removal (include reason, ex. noncompliance):  N/A    Medications removed (include reason, ex. therapy complete or physician discontinued):  N/A    Medications added/doses adjusted:  N/A    Other notes (ex. Recent course of antibiotics, Coumadin dosing):  Denies use of other OTC or herbal medications. Last dose times updated. Marnie Chamberlain [facility]    No current facility-administered medications on file prior to encounter.         Current Outpatient Medications on File Prior to Encounter   Medication Sig Dispense Refill    amLODIPine (NORVASC) 10 MG tablet Take 1 tablet by mouth daily 30 tablet 3    folic acid (FOLVITE) 1 MG tablet Take 1 tablet by mouth daily 30 tablet 3    pantoprazole (PROTONIX) 40 MG tablet Take 1 tablet by mouth every morning (before breakfast) 30 tablet 3    vitamin B-1 100 MG tablet Take 1 tablet by mouth daily 30 tablet 3    Multiple Vitamin (MULTIVITAMIN) TABS tablet Take 1 tablet by mouth daily      ondansetron (ZOFRAN ODT) 4 MG disintegrating tablet Take 1 tablet by mouth every 8 hours as needed for Nausea 20 tablet 0

## 2020-07-20 NOTE — PROGRESS NOTES
Pt to [id number], pt shivering repeatedly and excessively, states being cold. Febrile 99. 9. placed on icu monitors, othwise vss. Pt states he is able to understand me when asked if  line needed. Pt has flat affect, but able to verbalize all needs. Follows commands. States he stopped drinking alcohol about a month ago. Aware to call RN prior to getting OOB. Bed alarm on.  Seizure pads on bed

## 2020-07-20 NOTE — ED PROVIDER NOTES
dependence ([facility]     HTN (hypertension)          SURGICAL HISTORY       Past Surgical History:   Procedure Laterality Date    UPPER GASTROINTESTINAL ENDOSCOPY N/A 6/12/2020    EGD BIOPSY performed by Jj Becerra MD at [facility]       Previous Medications    AMLODIPINE (NORVASC) 10 MG TABLET    Take 1 tablet by mouth daily    FOLIC ACID (FOLVITE) 1 MG TABLET    Take 1 tablet by mouth daily    MULTIPLE VITAMIN (MULTIVITAMIN) TABS TABLET    Take 1 tablet by mouth daily    ONDANSETRON (ZOFRAN ODT) 4 MG DISINTEGRATING TABLET    Take 1 tablet by mouth every 8 hours as needed for Nausea    PANTOPRAZOLE (PROTONIX) 40 MG TABLET    Take 1 tablet by mouth every morning (before breakfast)    VITAMIN B-1 100 MG TABLET    Take 1 tablet by mouth daily         ALLERGIES     Patient has no known allergies. FAMILY HISTORY       Family History   Family history unknown: Yes          SOCIAL HISTORY       Social History     Socioeconomic History    Marital status: Legally      Spouse name: Drew Singer Number of children: None    Years of education: None    Highest education level: None   Occupational History    Occupation: unemployed   Social Needs    Financial resource strain: None    Food insecurity     Worry: None     Inability: None    Transportation needs     Medical: None     Non-medical: None   Tobacco Use    Smoking status: Former Smoker    Smokeless tobacco: Never Used    Tobacco comment: one a day    Substance and Sexual Activity    Alcohol use:  Yes     Alcohol/week: 2.0 standard drinks     Types: 2 Cans of beer per week     Comment: socially    Drug use: Never    Sexual activity: None   Lifestyle    Physical activity     Days per week: None     Minutes per session: None    Stress: None   Relationships    Social connections     Talks on phone: None     Gets together: None     Attends Spiritism service: None     Active member of club or organization: None Attends meetings of clubs or organizations: None     Relationship status: None    Intimate partner violence     Fear of current or ex partner: None     Emotionally abused: None     Physically abused: None     Forced sexual activity: None   Other Topics Concern    None   Social History Narrative    None       SCREENINGS    [place] Coma Scale  Eye Opening: Spontaneous  Best Verbal Response: Confused  Best Motor Response: Obeys commands  [place] Coma Scale Score: 14        PHYSICAL EXAM  (up to 7 for level 4, 8 or more for level 5)     ED Triage Vitals [07/20/20 1002]   BP Temp Temp Source Pulse Resp SpO2 Height Weight   139/68 -- Oral 104 14 97 % 5' 7\" (1.702 m) 145 lb (65.8 kg)       Physical Exam  Vitals signs and nursing note reviewed. Constitutional:       General: He is not in acute distress. Appearance: He is not diaphoretic. HENT:      Head: Atraumatic. Eyes:      General: No scleral icterus. Right eye: No discharge. Left eye: No discharge. Pupils: Pupils are equal, round, and reactive to light. Neck:      Musculoskeletal: Neck supple. Cardiovascular:      Rate and Rhythm: Normal rate and regular rhythm. Heart sounds: Normal heart sounds. No murmur. No friction rub. No gallop. Pulmonary:      Effort: Pulmonary effort is normal. No respiratory distress. Breath sounds: Normal breath sounds. No stridor. No wheezing or rales. Chest:      Chest wall: No tenderness. Abdominal:      General: Bowel sounds are normal. There is no distension. Palpations: Abdomen is soft. There is no mass. Tenderness: There is no abdominal tenderness. There is no guarding or rebound. Skin:     General: Skin is warm and dry. Neurological:      Mental Status: He is alert. GCS: GCS eye subscore is 4. GCS verbal subscore is 4. GCS motor subscore is 6. Motor: Tremor present. Psychiatric:         Speech: Speech is delayed. Behavior: Behavior is slowed. Final Result   Unremarkable portable exam           No results found. PROCEDURES   Unless otherwise noted below, none     Procedures    CRITICAL CARE TIME     There was a high probability of life-threatening clinical deterioration in the patient's condition requiring my urgent intervention. The total critical care time spent while evaluating and treating this patient was at least 32 minutes. This excludes time spent doing separately billable procedures. This includes time at the bedside, data interpretation, medication management, obtaining critical history from collateral sources if the patient is unable to provide it directly, and physician consultation. Specifics of interventions taken and potentially life-threatening diagnostic considerations are listed in the medical decision making. CONSULTS:  IP CONSULT TO HOSPITALIST  IP CONSULT TO SOCIAL WORK      EMERGENCY DEPARTMENT COURSE and DIFFERENTIAL DIAGNOSIS/MDM:   Vitals:    Vitals:    07/20/20 1002 07/20/20 1005 07/20/20 1218 07/20/20 1316   BP: 139/68 139/68 132/79    Pulse: 104  102    Resp: 14  20    Temp:    100.4 °F (38 °C)   TempSrc: Oral   Rectal   SpO2: 97% 98% 100%    Weight: 145 lb (65.8 kg)      Height: 5' 7\" (1.702 m)          Jacque Merlos was given the following medications:  Medications   ondansetron (ZOFRAN) injection 4 mg (4 mg Intravenous Given 7/20/20 1119)   acetaminophen (TYLENOL) tablet 1,000 mg (has no administration in time range)   0.9 % sodium chloride bolus (0 mLs Intravenous Stopped 7/20/20 1259)   cefTRIAXone (ROCEPHIN) 1 g in sterile water 10 mL IV syringe (1 g Intravenous Given 7/20/20 1309)   LORazepam (ATIVAN) injection 1 mg (1 mg Intravenous Given 7/20/20 1326)       Jacque Merlos was evaluated in the emergency department with concern for altered mental status. I have strong concern that this patient may have experienced a seizure as he appears postictal on my initial exam and has an elevated lactic acid.   He does improve during his duration in the ER. No ethanol detected in his symptoms but I am suspicious for alcohol withdrawal seizure. He was given Ativan in the ER. He is febrile here with a UTI. Treated with Tylenol, Rocephin, and IV fluids. Head CT shows no acute abnormality. The hospitalist was consulted and is in agreement for admission. This plan was discussed with the patient who is in agreement with the plan. FINAL IMPRESSION      1. Alcohol withdrawal seizure with perceptual disturbance ([facility] [address] [address]    2.  Acute cystitis without hematuria          DISPOSITION/PLAN   DISPOSITION Admitted 07/20/2020 01:58:47 PM      (Please note that portions of this note were completed with a voice recognition program.  Efforts were made to edit the dictations but occasionally words are mis-transcribed.)    ADAM Patten CNP (electronically signed)        ADAM Patten CNP  07/20/20 9902

## 2020-07-20 NOTE — ED PROVIDER NOTES
[facility] Emergency Department      Pt Name: Amanda Almaraz  MRN: [id number]  [place] 1975  Date of evaluation: 7/20/2020  Provider: Saran Murray MD  I independently performed a history and physical on Amanda Almaraz. All diagnostic, treatment, and disposition decisions were made by myself in conjunction with the advanced practice provider. HPI: Amanda Almaraz presented with   Chief Complaint   Patient presents with    Altered Mental Status     [place] [employer] called for pt found by sister in law on floor. Pt aler to self doen not follow commnds. \"     Amanda Almaraz has a past medical history of Alcohol abuse with physiological dependence ([address] and HTN (hypertension). He has a past surgical history that includes Upper gastrointestinal endoscopy (N/A, 6/12/2020). No current facility-administered medications on file prior to encounter.       Current Outpatient Medications on File Prior to Encounter   Medication Sig Dispense Refill    amLODIPine (NORVASC) 10 MG tablet Take 1 tablet by mouth daily 30 tablet 3    folic acid (FOLVITE) 1 MG tablet Take 1 tablet by mouth daily 30 tablet 3    pantoprazole (PROTONIX) 40 MG tablet Take 1 tablet by mouth every morning (before breakfast) 30 tablet 3    vitamin B-1 100 MG tablet Take 1 tablet by mouth daily 30 tablet 3    Multiple Vitamin (MULTIVITAMIN) TABS tablet Take 1 tablet by mouth daily      ondansetron (ZOFRAN ODT) 4 MG disintegrating tablet Take 1 tablet by mouth every 8 hours as needed for Nausea 20 tablet 0     PHYSICAL EXAM  Vitals: /68   Pulse 104   Resp 14   Ht 5' 7\" (1.702 m)   Wt 145 lb (65.8 kg)   SpO2 98%   BMI 22.71 kg/m²   Constitutional:  38 y.o. male alert, confused  HENT:  Atraumatic scalp, mucous membranes moist  Eyes:   Conjunctiva clear, no icterus  Neck:  Supple, no visible JVD, no signs of injury  Cardiovascular:  Regular, no rubs  Thorax & Lungs:  No accessory muscle usage, clear  Abdomen:  Soft, non distended, NT  Back:  No deformity  Genitalia:  Deferred  Rectal:  Deferred  Extremities:  No cyanosis, no edema, adequate perfusion  Skin:  Warm, dry  Neurologic:  Eyes open but dazed, initial speech was difficult to interpret, generally weak but able to move extremities without overt deficit, some tremor at rest  Psychiatric:  Not initially assessable    Medical Decision Making and Plan:  Briefly, this is an 38 y.o.male who presented with altered mental state, found on the floor by his family. History of alcohol abuse and does have tremor so we have concern he had alcohol withdrawal seizure. Mild elevation in temp. UTI present, thrombocytopenia more pronounced than his baseline. IV abx initiated, ativan, recommend [facility] protocol for admission. For further details of [facility] Emergency Department encounter, please see documentation by advanced practice provider VIRGINIA Hodges NP.     Labs Reviewed   CBC WITH AUTO DIFFERENTIAL - Abnormal; Notable for the following components:       Result Value    RBC 3.45 (*)     Hemoglobin 11.8 (*)     Hematocrit 37.8 (*)     .5 (*)     MCH 34.2 (*)     Platelets 54 (*)     Lymphocytes Absolute 0.3 (*)     All other components within normal limits    Narrative:     CALL  [facility] tel. [phone],                  Rejected Test Name/Called to:lacid cmp etoh lipas/pirtesh, 07/20/2020 11:47, by                  Josh Huerta                  Performed at:                  [facility] [address],  [facility], [address]   Phone [phone]   URINE RT REFLEX TO CULTURE - Abnormal; Notable for the following components:    Clarity, UA CLOUDY (*)     Blood, Urine LARGE (*)     Protein,  (*)     Leukocyte Esterase, Urine LARGE (*)     All other components within normal limits    Narrative:     Performed at:                  [facility] [address],  [facility], [address] prior study from 21 May 2020    CRITICAL CARE: Total critical care time of 31 minutes (excludes any time for procedures). This includes but not limited to vital sign monitoring, medication, clinical response to medications, interpretation of diagnostics, review of nursing notes, pertinent record review, discussions about patient condition, consultation time, documentation time. Critical care due to the patient's confusion, concern for alcohol withdrawal, infection. FINAL IMPRESSION:    1. Altered mental status, unspecified altered mental status type    2. Urinary tract infection in male    3. History of alcohol abuse    4.  Thrombocytopenia ([facility]       DISPOSITION Admitted 07/20/2020 01:58:47 PM       Anthony Gaitan MD  07/21/20 3433

## 2020-07-20 NOTE — H&P
Hospital Medicine History & Physical      PCP: No primary care provider on file. Date of Admission: 7/20/2020    Date of Service: Pt seen/examined on 7/20 and Admitted to Inpatient with expected LOS greater than two midnights due to medical therapy. Chief Complaint:  AMS    History Of Present Illness:     38 y.o. male with PMH of alcohol abuse, htn who presents with altered mental status. Pt is a poor historian at this time and does not remember specifics of what happened. Per EMR, pt brought to ED after his sister-in-law found him non-responsive on the ground. Was confused and mumbling at the time. Currently, pt is a poor historian and history limited. Past Medical History:          Diagnosis Date    Alcohol abuse with physiological dependence ([facility]     HTN (hypertension)        Past Surgical History:          Procedure Laterality Date    UPPER GASTROINTESTINAL ENDOSCOPY N/A 6/12/2020    EGD BIOPSY performed by Linsey Burgos MD at [address]       Medications Prior to Admission:      Prior to Admission medications    Medication Sig Start Date End Date Taking? Authorizing Provider   amLODIPine (NORVASC) 10 MG tablet Take 1 tablet by mouth daily 6/12/20  Yes ADAM Traore - CNP   folic acid (FOLVITE) 1 MG tablet Take 1 tablet by mouth daily 6/12/20  Yes ADAM Traore - CNP   pantoprazole (PROTONIX) 40 MG tablet Take 1 tablet by mouth every morning (before breakfast) 6/13/20  Yes ADAM Traore - CNP   vitamin B-1 100 MG tablet Take 1 tablet by mouth daily 6/13/20  Yes ADAM Traore - BOO[place]IE   Multiple Vitamin (MULTIVITAMIN) TABS tablet Take 1 tablet by mouth daily 5/22/20  Yes Historical Provider, MD   ondansetron (ZOFRAN ODT) 4 MG disintegrating tablet Take 1 tablet by mouth every 8 hours as needed for Nausea 6/9/20  Yes Sharon Bae PA-C       Allergies:  Patient has no known allergies.     Social History: TOBACCO:   reports that he has quit smoking. He has never used smokeless tobacco.  ETOH:   reports current alcohol use of about 2.0 standard drinks of alcohol per week. Family History:          Family history unknown: Yes       REVIEW OF SYSTEMS:   Pertinent positives as noted in the HPI. All other systems reviewed and negative. PHYSICAL EXAM:    /79   Pulse 102   Temp 100.4 °F (38 °C) (Rectal)   Resp 20   Ht 5' 7\" (1.702 m)   Wt 145 lb (65.8 kg)   SpO2 100%   BMI 22.71 kg/m²     Gen/overall appearance: Not in acute distress. Alert. Confused and poor historian  Head: Normocephalic, atraumatic  Eyes: EOMI, no scleral icterus  CVS: regular rate and rhythm, Normal S1S2  Pulm: Clear to auscultation bilaterally. No crackles/wheezes  Gastrointestinal: Soft, nontender, nondistended, no guarding or rebound  Extremities: No edema. No erythema or warmth  Neuro: No gross focal deficits noted, confused  Skin: Warm, dry    Labs:     Recent Labs     07/20/20  1121   WBC 6.0   HGB 11.8*   HCT 37.8*   PLT 54*     Recent Labs     07/20/20  1220   *   K 4.3   CL 95*   CO2 21   BUN 10   CREATININE 0.9   CALCIUM 9.6     Recent Labs     07/20/20  1220   *   *   BILITOT 0.3   ALKPHOS 87     No results for input(s): INR in the last 72 hours. No results for input(s): Osorio Amos in the last 72 hours. Urinalysis:      Lab Results   Component Value Date    NITRU Negative 07/20/2020    WBCUA 108 07/20/2020    BACTERIA 1+ 07/20/2020    RBCUA 8 07/20/2020    BLOODU LARGE 07/20/2020    SPECGRAV 1.014 07/20/2020    GLUCOSEU Negative 07/20/2020         ASSESSMENT:    Questionable alcohol withdrawal seizure.   Pt does deny alcohol use for the past month, but fairly poor historian  - CT head non-acute  - EtOH level 0 on admission  - seizures precautions  - CIWAs protocol  - c/w thiamine and folic acid  - gentle IVF hydration  - monitor and replace lytes    Suspected UTI  - IV rocephin for now  - follow up cultures    AMS. Likely acute metabolic encephalopathy 2/2 above    PMH of alcohol abuse, htn    Diet: No diet orders on file  Code Status: Prior    Dispo - Rashaun Vidal MD    Thank you No primary care provider on file. for the opportunity to be involved in this patient's care.

## 2020-07-21 LAB
ANION GAP SERPL CALCULATED.3IONS-SCNC: 12 MMOL/L (ref 3–16)
BUN BLDV-MCNC: 9 MG/DL (ref 7–20)
CALCIUM SERPL-MCNC: 9.1 MG/DL (ref 8.3–10.6)
CHLORIDE BLD-SCNC: 97 MMOL/L (ref 99–110)
CO2: 23 MMOL/L (ref 21–32)
CREAT SERPL-MCNC: 0.8 MG/DL (ref 0.9–1.3)
GFR AFRICAN AMERICAN: >60
GFR NON-AFRICAN AMERICAN: >60
GLUCOSE BLD-MCNC: 95 MG/DL (ref 70–99)
HCT VFR BLD CALC: 38.7 % (ref 40.5–52.5)
HEMOGLOBIN: 13.3 G/DL (ref 13.5–17.5)
MCH RBC QN AUTO: 33.8 PG (ref 26–34)
MCHC RBC AUTO-ENTMCNC: 34.3 G/DL (ref 31–36)
MCV RBC AUTO: 98.4 FL (ref 80–100)
PDW BLD-RTO: 13.2 % (ref 12.4–15.4)
PLATELET # BLD: 71 K/UL (ref 135–450)
PLATELET SLIDE REVIEW: ABNORMAL
PMV BLD AUTO: 8 FL (ref 5–10.5)
POTASSIUM REFLEX MAGNESIUM: 4.1 MMOL/L (ref 3.5–5.1)
RBC # BLD: 3.93 M/UL (ref 4.2–5.9)
SARS-COV-2, PCR: NOT DETECTED
SLIDE REVIEW: ABNORMAL
SODIUM BLD-SCNC: 132 MMOL/L (ref 136–145)
URINE CULTURE, ROUTINE: NORMAL
WBC # BLD: 3.1 K/UL (ref 4–11)

## 2020-07-21 PROCEDURE — 6360000002 HC RX W HCPCS: Performed by: INTERNAL MEDICINE

## 2020-07-21 PROCEDURE — 36415 COLL VENOUS BLD VENIPUNCTURE: CPT

## 2020-07-21 PROCEDURE — 2060000000 HC ICU INTERMEDIATE R&B

## 2020-07-21 PROCEDURE — 80048 BASIC METABOLIC PNL TOTAL CA: CPT

## 2020-07-21 PROCEDURE — 85027 COMPLETE CBC AUTOMATED: CPT

## 2020-07-21 PROCEDURE — 94760 N-INVAS EAR/PLS OXIMETRY 1: CPT

## 2020-07-21 PROCEDURE — 6370000000 HC RX 637 (ALT 250 FOR IP): Performed by: INTERNAL MEDICINE

## 2020-07-21 RX ORDER — LORAZEPAM 2 MG/ML
2 INJECTION INTRAMUSCULAR EVERY 4 HOURS PRN
Status: DISCONTINUED | OUTPATIENT
Start: 2020-07-21 | End: 2020-07-26 | Stop reason: HOSPADM

## 2020-07-21 RX ADMIN — FOLIC ACID 1 MG: 1 TABLET ORAL at 09:58

## 2020-07-21 RX ADMIN — AMLODIPINE BESYLATE 10 MG: 5 TABLET ORAL at 09:58

## 2020-07-21 RX ADMIN — Medication 100 MG: at 09:58

## 2020-07-21 RX ADMIN — ENOXAPARIN SODIUM 40 MG: 40 INJECTION SUBCUTANEOUS at 09:58

## 2020-07-21 RX ADMIN — Medication 1 G: at 13:42

## 2020-07-21 RX ADMIN — THERA TABS 1 TABLET: TAB at 09:58

## 2020-07-21 RX ADMIN — PANTOPRAZOLE SODIUM 40 MG: 40 TABLET, DELAYED RELEASE ORAL at 09:58

## 2020-07-21 ASSESSMENT — PAIN SCALES - GENERAL
PAINLEVEL_OUTOF10: 0
PAINLEVEL_OUTOF10: 0

## 2020-07-21 NOTE — PROGRESS NOTES
Reassessment complete. See flowsheet. No acute changes or complaints at this time. Will continue to monitor.

## 2020-07-21 NOTE — PROGRESS NOTES
07/20/20  1220 07/21/20  0529   * 132*   K 4.3 4.1   CL 95* 97*   CO2 21 23   BUN 10 9   CREATININE 0.9 0.8*   CALCIUM 9.6 9.1     Recent Labs     07/20/20  1220   *   *   BILITOT 0.3   ALKPHOS 87     No results for input(s): INR in the last 72 hours. No results for input(s): Radha Formosa in the last 72 hours. Assessment/Plan:    Active Hospital Problems    Diagnosis Date Noted    Altered mental status [R41.82] 07/20/2020     Questionable alcohol withdrawal seizure. Pt does deny alcohol use for the past month, but fairly poor historian  - CT head non-acute  - EtOH level 0 on admission  - seizures precautions  - prn ativan for seizures  - CIWAs protocol  - c/w thiamine and folic acid  - gentle IVF hydration  - monitor and replace lytes     Suspected UTI  - IV rocephin for now  - follow up cultures     AMS.   Likely acute metabolic encephalopathy 2/2 above     PMH of alcohol abuse, htn    Diet: DIET GENERAL;  Code Status: Full Code    Dispo - Billee MD Molina .

## 2020-07-22 LAB
ANION GAP SERPL CALCULATED.3IONS-SCNC: 11 MMOL/L (ref 3–16)
BUN BLDV-MCNC: 9 MG/DL (ref 7–20)
CALCIUM SERPL-MCNC: 9.1 MG/DL (ref 8.3–10.6)
CHLORIDE BLD-SCNC: 97 MMOL/L (ref 99–110)
CO2: 24 MMOL/L (ref 21–32)
CREAT SERPL-MCNC: 0.7 MG/DL (ref 0.9–1.3)
GFR AFRICAN AMERICAN: >60
GFR NON-AFRICAN AMERICAN: >60
GLUCOSE BLD-MCNC: 89 MG/DL (ref 70–99)
HCT VFR BLD CALC: 39.2 % (ref 40.5–52.5)
HEMOGLOBIN: 13.4 G/DL (ref 13.5–17.5)
MCH RBC QN AUTO: 33.5 PG (ref 26–34)
MCHC RBC AUTO-ENTMCNC: 34.1 G/DL (ref 31–36)
MCV RBC AUTO: 98.4 FL (ref 80–100)
PDW BLD-RTO: 12.8 % (ref 12.4–15.4)
PLATELET # BLD: 66 K/UL (ref 135–450)
PMV BLD AUTO: 7.6 FL (ref 5–10.5)
POTASSIUM REFLEX MAGNESIUM: 3.8 MMOL/L (ref 3.5–5.1)
RBC # BLD: 3.99 M/UL (ref 4.2–5.9)
SODIUM BLD-SCNC: 132 MMOL/L (ref 136–145)
WBC # BLD: 2.6 K/UL (ref 4–11)

## 2020-07-22 PROCEDURE — 36415 COLL VENOUS BLD VENIPUNCTURE: CPT

## 2020-07-22 PROCEDURE — 6360000002 HC RX W HCPCS: Performed by: INTERNAL MEDICINE

## 2020-07-22 PROCEDURE — 2060000000 HC ICU INTERMEDIATE R&B

## 2020-07-22 PROCEDURE — 6370000000 HC RX 637 (ALT 250 FOR IP): Performed by: INTERNAL MEDICINE

## 2020-07-22 PROCEDURE — 2580000003 HC RX 258: Performed by: INTERNAL MEDICINE

## 2020-07-22 PROCEDURE — 85027 COMPLETE CBC AUTOMATED: CPT

## 2020-07-22 PROCEDURE — 80048 BASIC METABOLIC PNL TOTAL CA: CPT

## 2020-07-22 RX ADMIN — ENOXAPARIN SODIUM 40 MG: 40 INJECTION SUBCUTANEOUS at 10:01

## 2020-07-22 RX ADMIN — FOLIC ACID 1 MG: 1 TABLET ORAL at 10:01

## 2020-07-22 RX ADMIN — AMLODIPINE BESYLATE 10 MG: 5 TABLET ORAL at 10:01

## 2020-07-22 RX ADMIN — PANTOPRAZOLE SODIUM 40 MG: 40 TABLET, DELAYED RELEASE ORAL at 05:55

## 2020-07-22 RX ADMIN — Medication 100 MG: at 10:01

## 2020-07-22 RX ADMIN — Medication 10 ML: at 10:01

## 2020-07-22 RX ADMIN — Medication 10 ML: at 13:00

## 2020-07-22 RX ADMIN — Medication 1 G: at 13:00

## 2020-07-22 RX ADMIN — THERA TABS 1 TABLET: TAB at 10:01

## 2020-07-22 RX ADMIN — Medication 10 ML: at 22:58

## 2020-07-22 RX ADMIN — SODIUM CHLORIDE: 9 INJECTION, SOLUTION INTRAVENOUS at 00:07

## 2020-07-22 ASSESSMENT — PAIN SCALES - GENERAL
PAINLEVEL_OUTOF10: 0

## 2020-07-22 NOTE — PLAN OF CARE
Problem: Falls - Risk of:  Goal: Will remain free from falls  Description: Will remain free from falls  7/22/2020 1108 by Brinda Ray RN  Outcome: Ongoing  7/22/2020 1108 by Brinda Ray RN  Outcome: Ongoing  7/22/2020 0516 by Tra Lord RN  Outcome: Ongoing  Goal: Absence of physical injury  Description: Absence of physical injury  7/22/2020 1108 by Brinda Ray RN  Outcome: Ongoing  7/22/2020 1108 by Brinda Ray RN  Outcome: Ongoing  7/22/2020 0516 by Tra Lord RN  Outcome: Ongoing     Problem: Coping:  Goal: Ability to cope will improve  Description: Ability to cope will improve  7/22/2020 1108 by Brinda Ray RN  Outcome: Ongoing  7/22/2020 1108 by Brinda Ray RN  Outcome: Ongoing  7/22/2020 0516 by Tra Lord RN  Outcome: Ongoing  Goal: Ability to identify appropriate support needs will improve  Description: Ability to identify appropriate support needs will improve  7/22/2020 1108 by Brinda Ray RN  Outcome: Ongoing  7/22/2020 1108 by Brinda Ray RN  Outcome: Ongoing  7/22/2020 0516 by Tra Lord RN  Outcome: Ongoing     Problem: Health Behavior:  Goal: Ability to manage health-related needs will improve  Description: Ability to manage health-related needs will improve  7/22/2020 1108 by Brinda Ray RN  Outcome: Ongoing  7/22/2020 1108 by Brinda Ray RN  Outcome: Ongoing  7/22/2020 0516 by Tra Lord RN  Outcome: Ongoing     Problem: Physical Regulation:  Goal: Signs of adequate cerebral perfusion will increase  Description: Signs of adequate cerebral perfusion will increase  7/22/2020 1108 by Brinda Ray RN  Outcome: Ongoing  7/22/2020 1108 by Brinda Ray RN  Outcome: Ongoing  7/22/2020 0516 by Tra Lord RN  Outcome: Ongoing  Goal: Ability to maintain a stable neurologic state will improve  Description: Ability to maintain a stable neurologic state will improve  7/22/2020 1108 by Brinda Ray RN  Outcome: Ongoing  7/22/2020 0516 by Ever Rutherford RN  Outcome: Ongoing     Problem: Self-Concept:  Goal: Level of anxiety will decrease  Description: Level of anxiety will decrease  7/22/2020 1108 by Salo Medina RN  Outcome: Ongoing  7/22/2020 0516 by Ever Rutherford RN  Outcome: Ongoing  Goal: Ability to verbalize feelings about condition will improve  Description: Ability to verbalize feelings about condition will improve  7/22/2020 1108 by Salo Medina RN  Outcome: Ongoing  7/22/2020 0516 by Ever Rutherford RN  Outcome: Ongoing     Problem: Safety:  Goal: Ability to remain free from injury will improve  Description: Ability to remain free from injury will improve  7/22/2020 1108 by Salo Medina RN  Outcome: Ongoing  7/22/2020 0516 by Ever Rutherford RN  Outcome: Ongoing     Problem: Discharge Planning:  Goal: Discharged to appropriate level of care  Description: Discharged to appropriate level of care  7/22/2020 1108 by Salo Medina RN  Outcome: Ongoing  7/22/2020 0516 by Ever Rutherford RN  Outcome: Ongoing     Problem: Fluid Volume - Deficit:  Goal: Absence of fluid volume deficit signs and symptoms  Description: Absence of fluid volume deficit signs and symptoms  7/22/2020 1108 by Salo Medina RN  Outcome: Ongoing  7/22/2020 0516 by Ever Rutherford RN  Outcome: Ongoing     Problem: Violence - Risk of, Self/Other-Directed:  Goal: Knowledge of developmental care interventions  Description: Absence of violence  7/22/2020 1108 by Salo Medina RN  Outcome: Ongoing  7/22/2020 0516 by Ever Rutherford RN  Outcome: Ongoing

## 2020-07-22 NOTE — PLAN OF CARE
symptoms  Outcome: Ongoing   Monitoring I & O's.     Problem: Violence - Risk of, Self/Other-Directed:  Goal: Knowledge of developmental care interventions  Description: Absence of violence  Outcome: Ongoing   Patient calm and cooperative. No signs of distress.

## 2020-07-22 NOTE — PROGRESS NOTES
Patient to be transferred to room 92 34 70. Report called to Jose Maria Jewell RN.  Electronically signed by Maureen Damon RN on 7/22/2020 at 11:16 AM

## 2020-07-22 NOTE — PLAN OF CARE
Problem: Falls - Risk of:  Goal: Will remain free from falls  Description: Will remain free from falls  7/22/2020 1314 by Carla Carrera RN  Outcome: Ongoing  Note: Patient remains absent from falls at this time. Remains alert and oriented, in bed with call light and belongings in reach. Non-slip footwear on and padded siderails in place. Bed remains in lowest/locked position at all times with alarm activated. Fall precautions in place. Patient encouraged to use call light to request assistance, v/u.  Will continue to monitor.

## 2020-07-22 NOTE — PROGRESS NOTES
Shift assessment complete. Pt is alert and oriented X4. Lung sounds noted to be clear. Pt is NSR per monitor. No adventitious heart sounds noted. Bowel sounds present and active. Radial and pedal pulses present. IV Left FA patent and flushing well with brisk blood return. Patient able to turn himself effectively to relieve pressure off of bony prominences.

## 2020-07-22 NOTE — CARE COORDINATION
Discharge planning note:     Chart reviewed and it appears that patient has minimal needs for discharge at this time. Discussed with patients nurse and requested that case management be notified if discharge needs are identified. COVID test negative and patient transferred to PCU. Met with patient and provided Substance Abuse Resources. He verbalized understanding but stated he does not drink too much. Did accept the resources. Case management will continue to follow progress and update discharge plan as needed.     Yumi Duran RN BSN  Case Management

## 2020-07-23 ENCOUNTER — APPOINTMENT (OUTPATIENT)
Dept: MRI IMAGING | Age: 45
End: 2020-07-23
Payer: MEDICAID

## 2020-07-23 LAB
ANION GAP SERPL CALCULATED.3IONS-SCNC: 9 MMOL/L (ref 3–16)
BUN BLDV-MCNC: 9 MG/DL (ref 7–20)
CALCIUM SERPL-MCNC: 9.2 MG/DL (ref 8.3–10.6)
CHLORIDE BLD-SCNC: 97 MMOL/L (ref 99–110)
CO2: 25 MMOL/L (ref 21–32)
CREAT SERPL-MCNC: 0.7 MG/DL (ref 0.9–1.3)
GFR AFRICAN AMERICAN: >60
GFR NON-AFRICAN AMERICAN: >60
GLUCOSE BLD-MCNC: 91 MG/DL (ref 70–99)
HCT VFR BLD CALC: 38.7 % (ref 40.5–52.5)
HEMOGLOBIN: 13.3 G/DL (ref 13.5–17.5)
MCH RBC QN AUTO: 33.6 PG (ref 26–34)
MCHC RBC AUTO-ENTMCNC: 34.4 G/DL (ref 31–36)
MCV RBC AUTO: 97.8 FL (ref 80–100)
PDW BLD-RTO: 12.7 % (ref 12.4–15.4)
PLATELET # BLD: 71 K/UL (ref 135–450)
PMV BLD AUTO: 7.8 FL (ref 5–10.5)
POTASSIUM REFLEX MAGNESIUM: 3.7 MMOL/L (ref 3.5–5.1)
RBC # BLD: 3.96 M/UL (ref 4.2–5.9)
SODIUM BLD-SCNC: 131 MMOL/L (ref 136–145)
WBC # BLD: 2.3 K/UL (ref 4–11)

## 2020-07-23 PROCEDURE — 6360000002 HC RX W HCPCS: Performed by: INTERNAL MEDICINE

## 2020-07-23 PROCEDURE — 85027 COMPLETE CBC AUTOMATED: CPT

## 2020-07-23 PROCEDURE — 94760 N-INVAS EAR/PLS OXIMETRY 1: CPT

## 2020-07-23 PROCEDURE — 2060000000 HC ICU INTERMEDIATE R&B

## 2020-07-23 PROCEDURE — 6370000000 HC RX 637 (ALT 250 FOR IP): Performed by: INTERNAL MEDICINE

## 2020-07-23 PROCEDURE — 2580000003 HC RX 258: Performed by: INTERNAL MEDICINE

## 2020-07-23 PROCEDURE — 80048 BASIC METABOLIC PNL TOTAL CA: CPT

## 2020-07-23 PROCEDURE — 70551 MRI BRAIN STEM W/O DYE: CPT

## 2020-07-23 PROCEDURE — 36415 COLL VENOUS BLD VENIPUNCTURE: CPT

## 2020-07-23 RX ORDER — HALOPERIDOL 5 MG/ML
5 INJECTION INTRAMUSCULAR EVERY 6 HOURS PRN
Status: DISCONTINUED | OUTPATIENT
Start: 2020-07-23 | End: 2020-07-26 | Stop reason: HOSPADM

## 2020-07-23 RX ADMIN — LORAZEPAM 2 MG: 1 TABLET ORAL at 02:58

## 2020-07-23 RX ADMIN — Medication 100 MG: at 09:43

## 2020-07-23 RX ADMIN — FOLIC ACID 1 MG: 1 TABLET ORAL at 09:43

## 2020-07-23 RX ADMIN — LORAZEPAM 4 MG: 2 INJECTION INTRAMUSCULAR; INTRAVENOUS at 09:51

## 2020-07-23 RX ADMIN — LORAZEPAM 4 MG: 2 INJECTION INTRAMUSCULAR; INTRAVENOUS at 08:02

## 2020-07-23 RX ADMIN — AMLODIPINE BESYLATE 10 MG: 5 TABLET ORAL at 09:43

## 2020-07-23 RX ADMIN — Medication 10 ML: at 10:00

## 2020-07-23 RX ADMIN — THERA TABS 1 TABLET: TAB at 09:43

## 2020-07-23 RX ADMIN — PANTOPRAZOLE SODIUM 40 MG: 40 TABLET, DELAYED RELEASE ORAL at 05:38

## 2020-07-23 RX ADMIN — HALOPERIDOL LACTATE 5 MG: 5 INJECTION INTRAMUSCULAR at 06:00

## 2020-07-23 RX ADMIN — LORAZEPAM 4 MG: 2 INJECTION INTRAMUSCULAR; INTRAVENOUS at 07:09

## 2020-07-23 RX ADMIN — ENOXAPARIN SODIUM 40 MG: 40 INJECTION SUBCUTANEOUS at 09:43

## 2020-07-23 ASSESSMENT — PAIN SCALES - WONG BAKER
WONGBAKER_NUMERICALRESPONSE: 0
WONGBAKER_NUMERICALRESPONSE: 0

## 2020-07-23 ASSESSMENT — PAIN SCALES - GENERAL
PAINLEVEL_OUTOF10: 0

## 2020-07-23 NOTE — PROGRESS NOTES
Up out of bed again. Will not use the call light. Up to the bathroom. Voids and has BM each time he uses the bathroom. Has periods with clear conversations then has periods of confusion. Will continue to monitor.

## 2020-07-23 NOTE — PROGRESS NOTES
HR elevated per monitor. Found out of the room in the hallway attempting to enter patient rooms. Thinks he was outside. Confused about his location at this time. Follows commands. CIWA assessed. Score is 15. Although patient states he has not had anything to drinking for 1 month. Denies smoking weed or cigarettes. Will give ativan orally. Some memory loss about things that occurred at the beginning of the shift. Bed alarm is on, will place camera in the room.

## 2020-07-23 NOTE — PROGRESS NOTES
Hospitalist Progress Note      PCP: No primary care provider on file. Date of Admission: 7/20/2020    Chief Complaint: AMS      Subjective:   More confused today  Poor historian    Medications:  Reviewed    Infusion Medications     Scheduled Medications    multivitamin  1 tablet Oral Daily    amLODIPine  10 mg Oral Daily    folic acid  1 mg Oral Daily    pantoprazole  40 mg Oral QAM AC    thiamine  100 mg Oral Daily    sodium chloride flush  10 mL Intravenous 2 times per day    enoxaparin  40 mg Subcutaneous Daily     PRN Meds: haloperidol lactate, LORazepam, sodium chloride flush, acetaminophen **OR** acetaminophen, polyethylene glycol, promethazine **OR** ondansetron, potassium chloride **OR** potassium alternative oral replacement **OR** potassium chloride, LORazepam **OR** LORazepam **OR** LORazepam **OR** LORazepam **OR** LORazepam **OR** LORazepam **OR** LORazepam **OR** LORazepam      Intake/Output Summary (Last 24 hours) at 7/23/2020 1304  Last data filed at 7/22/2020 1730  Gross per 24 hour   Intake --   Output 250 ml   Net -250 ml       Exam:    /86   Pulse 94   Temp 97.4 °F (36.3 °C) (Axillary)   Resp 16   Ht 5' 7\" (1.702 m)   Wt 141 lb 1.5 oz (64 kg)   SpO2 99%   BMI 22.10 kg/m²     Gen/overall appearance: Not in acute distress. Alert. Poor historian at times  Head: Normocephalic, atraumatic  Eyes: EOMI, no scleral icterus  CVS: regular rate and rhythm, Normal S1S2  Pulm: Clear to auscultation bilaterally. No crackles/wheezes  Gastrointestinal: Soft, nontender, nondistended, no guarding or rebound  Extremities: No edema.  No erythema or warmth  Neuro: No gross focal deficits noted, confused  Skin: Warm, dry    Labs:   Recent Labs     07/21/20  0529 07/22/20  0442 07/23/20  0435   WBC 3.1* 2.6* 2.3*   HGB 13.3* 13.4* 13.3*   HCT 38.7* 39.2* 38.7*   PLT 71* 66* 71*     Recent Labs     07/21/20  0529 07/22/20  0442 07/23/20  0435   * 132* 131*   K 4.1 3.8 3.7   CL 97* 97* 97*   CO2 23 24 25   BUN 9 9 9   CREATININE 0.8* 0.7* 0.7*   CALCIUM 9.1 9.1 9.2     No results for input(s): AST, ALT, BILIDIR, BILITOT, ALKPHOS in the last 72 hours. No results for input(s): INR in the last 72 hours. No results for input(s): Glean Orthodox in the last 72 hours. Assessment/Plan:    Active Hospital Problems    Diagnosis Date Noted    Altered mental status [R41.82] 07/20/2020     Questionable alcohol withdrawal seizure. Pt does deny alcohol use for the past month, but fairly poor historian  AMS. Likely acute metabolic encephalopathy 2/2 above?  - CT head non-acute  - EtOH level 0 on admission  - seizures precautions  - prn ativan for seizures  - CIWAs protocol  - c/w thiamine and folic acid  - s/p gentle IVF hydration  - monitor and replace lytes  - MRI brain     Asymptomatic bacteruria.   UCx with mixed isra  - IV rocephin d/farideh     PMH of alcohol abuse, htn    Diet: DIET GENERAL;  Code Status: Full Code    Dispo - Mitali Marin MD

## 2020-07-23 NOTE — PROGRESS NOTES
Still awake. Up to the bathroom. Returned to bed. States, very clearly in [place], [place]" Robbi Vinson is gone. \" when asked where is he? Stated ,Under there\" pointing to the cabinet in the room. While this nurse at the bedside charting, patient began laughing inappropriately. When asked what was wrong, stated, \" someone was standing behind you but they are gone now. \" stated they were about 5' 2\" tall and around 48years old. \"  Randomly talking to non existing people in the room. The Ativan has had no effect.

## 2020-07-24 LAB
ANION GAP SERPL CALCULATED.3IONS-SCNC: 12 MMOL/L (ref 3–16)
BUN BLDV-MCNC: 10 MG/DL (ref 7–20)
CALCIUM SERPL-MCNC: 9.3 MG/DL (ref 8.3–10.6)
CHLORIDE BLD-SCNC: 99 MMOL/L (ref 99–110)
CO2: 25 MMOL/L (ref 21–32)
CREAT SERPL-MCNC: 0.7 MG/DL (ref 0.9–1.3)
GFR AFRICAN AMERICAN: >60
GFR NON-AFRICAN AMERICAN: >60
GLUCOSE BLD-MCNC: 104 MG/DL (ref 70–99)
HCT VFR BLD CALC: 40.4 % (ref 40.5–52.5)
HEMOGLOBIN: 14 G/DL (ref 13.5–17.5)
MCH RBC QN AUTO: 34 PG (ref 26–34)
MCHC RBC AUTO-ENTMCNC: 34.7 G/DL (ref 31–36)
MCV RBC AUTO: 97.9 FL (ref 80–100)
PDW BLD-RTO: 12.9 % (ref 12.4–15.4)
PLATELET # BLD: 96 K/UL (ref 135–450)
PMV BLD AUTO: 7.6 FL (ref 5–10.5)
POTASSIUM REFLEX MAGNESIUM: 3.6 MMOL/L (ref 3.5–5.1)
RBC # BLD: 4.12 M/UL (ref 4.2–5.9)
SODIUM BLD-SCNC: 136 MMOL/L (ref 136–145)
T3 TOTAL: 0.9 NG/ML (ref 0.8–2)
T4 FREE: 1.3 NG/DL (ref 0.9–1.8)
TSH REFLEX: 0.25 UIU/ML (ref 0.27–4.2)
VITAMIN B-12: 1915 PG/ML (ref 211–911)
WBC # BLD: 2.1 K/UL (ref 4–11)

## 2020-07-24 PROCEDURE — 80048 BASIC METABOLIC PNL TOTAL CA: CPT

## 2020-07-24 PROCEDURE — 6370000000 HC RX 637 (ALT 250 FOR IP): Performed by: INTERNAL MEDICINE

## 2020-07-24 PROCEDURE — 84439 ASSAY OF FREE THYROXINE: CPT

## 2020-07-24 PROCEDURE — 2580000003 HC RX 258: Performed by: INTERNAL MEDICINE

## 2020-07-24 PROCEDURE — 94760 N-INVAS EAR/PLS OXIMETRY 1: CPT

## 2020-07-24 PROCEDURE — 36415 COLL VENOUS BLD VENIPUNCTURE: CPT

## 2020-07-24 PROCEDURE — 84480 ASSAY TRIIODOTHYRONINE (T3): CPT

## 2020-07-24 PROCEDURE — 85027 COMPLETE CBC AUTOMATED: CPT

## 2020-07-24 PROCEDURE — 2060000000 HC ICU INTERMEDIATE R&B

## 2020-07-24 PROCEDURE — 84443 ASSAY THYROID STIM HORMONE: CPT

## 2020-07-24 PROCEDURE — 82607 VITAMIN B-12: CPT

## 2020-07-24 RX ADMIN — Medication 10 ML: at 10:30

## 2020-07-24 RX ADMIN — PANTOPRAZOLE SODIUM 40 MG: 40 TABLET, DELAYED RELEASE ORAL at 05:25

## 2020-07-24 RX ADMIN — FOLIC ACID 1 MG: 1 TABLET ORAL at 14:20

## 2020-07-24 RX ADMIN — Medication 10 ML: at 19:41

## 2020-07-24 RX ADMIN — Medication 10 ML: at 00:09

## 2020-07-24 RX ADMIN — LORAZEPAM 2 MG: 1 TABLET ORAL at 00:08

## 2020-07-24 RX ADMIN — THERA TABS 1 TABLET: TAB at 14:20

## 2020-07-24 RX ADMIN — Medication 100 MG: at 14:20

## 2020-07-24 ASSESSMENT — PAIN SCALES - GENERAL
PAINLEVEL_OUTOF10: 0

## 2020-07-24 NOTE — FLOWSHEET NOTE
07/24/20 1930   Vital Signs   Temp 98.3 °F (36.8 °C)   Temp Source Oral   Pulse 83   Heart Rate Source Monitor;Brachial;Left   Resp 16   /67   BP Location Left upper arm   Patient Position High fowlers   Level of Consciousness 0   MEWS Score 2   Patient Currently in Pain Denies   Pain Assessment   Pain Assessment 0-10   Pain Level 0   Oxygen Therapy   SpO2 99 %   Pulse Oximeter Device Mode Intermittent   Pulse Oximeter Device Location Finger   O2 Device None (Room air)   Assessment complete. VSS no SOB or any distress noted. Call light within reach, pt encouraged to call if any needs arise. No further requests at this time. Will continue to monitor.   Snack  bathroom

## 2020-07-24 NOTE — PROGRESS NOTES
* 131* 136   K 3.8 3.7 3.6   CL 97* 97* 99   CO2 24 25 25   BUN 9 9 10   CREATININE 0.7* 0.7* 0.7*   CALCIUM 9.1 9.2 9.3     No results for input(s): AST, ALT, BILIDIR, BILITOT, ALKPHOS in the last 72 hours. No results for input(s): INR in the last 72 hours. No results for input(s): Dulcie Mean in the last 72 hours. Assessment/Plan:    Active Hospital Problems    Diagnosis Date Noted    Altered mental status [R41.82] 07/20/2020     Questionable alcohol withdrawal seizure. Pt does deny alcohol use for the past month, but fairly poor historian  AMS. Likely acute metabolic encephalopathy 2/2 above?  - CT head non-acute  - EtOH level 0 on admission  - seizures precautions  - prn ativan for seizures  - CIWAs protocol  - c/w thiamine and folic acid  - s/p gentle IVF hydration  - monitor and replace lytes  - MRI brain nonacute  - b12, TSH  - PTOT     Asymptomatic bacteruria.   UCx with mixed isra  - IV rocephin d/farideh     PMH of alcohol abuse, htn    Diet: DIET GENERAL;  Code Status: Full Code    Dispo - Ernesto Romberg, MD

## 2020-07-24 NOTE — PROGRESS NOTES
Awake, up to bathroom with standby assist, gait steady, cooperative, oriented x4, voicing no complaints. Assessment completed, see flow charts. No distress noted. roseann

## 2020-07-25 LAB
ANION GAP SERPL CALCULATED.3IONS-SCNC: 10 MMOL/L (ref 3–16)
BUN BLDV-MCNC: 10 MG/DL (ref 7–20)
CALCIUM SERPL-MCNC: 9.4 MG/DL (ref 8.3–10.6)
CHLORIDE BLD-SCNC: 102 MMOL/L (ref 99–110)
CO2: 26 MMOL/L (ref 21–32)
CREAT SERPL-MCNC: 0.7 MG/DL (ref 0.9–1.3)
GFR AFRICAN AMERICAN: >60
GFR NON-AFRICAN AMERICAN: >60
GLUCOSE BLD-MCNC: 112 MG/DL (ref 70–99)
HCT VFR BLD CALC: 38.4 % (ref 40.5–52.5)
HEMOGLOBIN: 13.3 G/DL (ref 13.5–17.5)
MCH RBC QN AUTO: 34.2 PG (ref 26–34)
MCHC RBC AUTO-ENTMCNC: 34.5 G/DL (ref 31–36)
MCV RBC AUTO: 99.2 FL (ref 80–100)
PDW BLD-RTO: 12.6 % (ref 12.4–15.4)
PLATELET # BLD: 125 K/UL (ref 135–450)
PMV BLD AUTO: 7.7 FL (ref 5–10.5)
POTASSIUM REFLEX MAGNESIUM: 3.6 MMOL/L (ref 3.5–5.1)
RBC # BLD: 3.87 M/UL (ref 4.2–5.9)
SODIUM BLD-SCNC: 138 MMOL/L (ref 136–145)
WBC # BLD: 2.5 K/UL (ref 4–11)

## 2020-07-25 PROCEDURE — 6370000000 HC RX 637 (ALT 250 FOR IP): Performed by: INTERNAL MEDICINE

## 2020-07-25 PROCEDURE — 2580000003 HC RX 258: Performed by: INTERNAL MEDICINE

## 2020-07-25 PROCEDURE — 80048 BASIC METABOLIC PNL TOTAL CA: CPT

## 2020-07-25 PROCEDURE — 2060000000 HC ICU INTERMEDIATE R&B

## 2020-07-25 PROCEDURE — 85027 COMPLETE CBC AUTOMATED: CPT

## 2020-07-25 PROCEDURE — 36415 COLL VENOUS BLD VENIPUNCTURE: CPT

## 2020-07-25 RX ADMIN — AMLODIPINE BESYLATE 10 MG: 5 TABLET ORAL at 11:40

## 2020-07-25 RX ADMIN — THERA TABS 1 TABLET: TAB at 11:40

## 2020-07-25 RX ADMIN — PANTOPRAZOLE SODIUM 40 MG: 40 TABLET, DELAYED RELEASE ORAL at 05:24

## 2020-07-25 RX ADMIN — Medication 100 MG: at 11:40

## 2020-07-25 RX ADMIN — FOLIC ACID 1 MG: 1 TABLET ORAL at 11:40

## 2020-07-25 RX ADMIN — Medication 10 ML: at 11:43

## 2020-07-25 RX ADMIN — Medication 10 ML: at 21:04

## 2020-07-25 ASSESSMENT — PAIN SCALES - GENERAL
PAINLEVEL_OUTOF10: 0

## 2020-07-25 NOTE — PROGRESS NOTES
VSS, assessment as charted. Assisted to stand @bedside for void, returned to bed. Denies dizziness, lightheadedness or pain. Tremors noted, seizure precautions remain in place, advised to call for assist if he needs to get OARMANDO MEHTA.

## 2020-07-25 NOTE — PLAN OF CARE
Problem: Falls - Risk of:  Goal: Will remain free from falls  Description: Will remain free from falls  7/25/2020 0655 by Mary Jimenez RN  Outcome: Ongoing  Note: Remains free from falls this shift. Problem: Coping:  Goal: Ability to cope will improve  Description: Ability to cope will improve  7/25/2020 0655 by Mary Jimenez RN  Outcome: Ongoing  Note: No seizure activity noted this shift, precautions in place. Problem: Physical Regulation:  Goal: Ability to maintain a stable neurologic state will improve  Description: Ability to maintain a stable neurologic state will improve  Outcome: Ongoing  Note: No neuro deficits noted this shift.

## 2020-07-25 NOTE — PLAN OF CARE
Problem: Falls - Risk of:  Goal: Will remain free from falls  Description: Will remain free from falls  7/25/2020 1405 by Issa Cobos RN  Outcome: Ongoing     Problem: Coping:  Goal: Ability to cope will improve  Description: Ability to cope will improve  7/25/2020 1405 by Issa Cobos RN  Outcome: Ongoing     Problem: Safety:  Goal: Ability to remain free from injury will improve  Description: Ability to remain free from injury will improve  Outcome: Ongoing     Problem: Self-Concept:  Goal: Ability to verbalize feelings about condition will improve  Description: Ability to verbalize feelings about condition will improve  Outcome: Ongoing     Pt A&O x4. Pt had no falls so far this shift. Fall precautions in place. Pt aware of plan of care. WCTM.

## 2020-07-25 NOTE — PLAN OF CARE
Problem: Falls - Risk of:  Goal: Will remain free from falls  Description: Will remain free from falls  Outcome: Ongoing  Note: Pt remains free from falls. Safety precautions in place. Bed in lowest position, bed wheels locked, call light with in reach, bed alarm on, yellow blanket in place, fall risk wrist band on, SAFE outside of doorway. Will continue to monitor. Problem: Falls - Risk of:  Goal: Absence of physical injury  Description: Absence of physical injury  Outcome: Ongoing  Note: Fall risk assessment completed. Pt gait steady. Clear pathway provided to and from bathroom as pt is ambulatory. Bed kept in lowest position with wheels locked. Call light within reach. Pt encouraged to call for any needs. Pt free from accidental injury this shift. Problem: Coping:  Goal: Ability to cope will improve  Description: Ability to cope will improve  Outcome: Ongoing  Note: Pt has been calm and cooperative this shift. Pt has been compliant with all he is asked to do.

## 2020-07-25 NOTE — PROGRESS NOTES
Hospitalist Progress Note      PCP: No primary care provider on file. Date of Admission: 7/20/2020    Chief Complaint: AMS      Subjective:   Mental status labile but seems improving. Denies acute complaints. Medications:  Reviewed    Infusion Medications     Scheduled Medications    multivitamin  1 tablet Oral Daily    amLODIPine  10 mg Oral Daily    folic acid  1 mg Oral Daily    pantoprazole  40 mg Oral QAM AC    thiamine  100 mg Oral Daily    sodium chloride flush  10 mL Intravenous 2 times per day    enoxaparin  40 mg Subcutaneous Daily     PRN Meds: haloperidol lactate, LORazepam, sodium chloride flush, acetaminophen **OR** acetaminophen, polyethylene glycol, promethazine **OR** ondansetron, potassium chloride **OR** potassium alternative oral replacement **OR** potassium chloride, LORazepam **OR** LORazepam **OR** LORazepam **OR** LORazepam **OR** LORazepam **OR** LORazepam **OR** LORazepam **OR** LORazepam      Intake/Output Summary (Last 24 hours) at 7/25/2020 1436  Last data filed at 7/25/2020 1143  Gross per 24 hour   Intake 240 ml   Output 575 ml   Net -335 ml       Exam:    /89   Pulse 83   Temp 98.9 °F (37.2 °C) (Oral)   Resp 15   Ht 5' 7\" (1.702 m)   Wt 141 lb 8.6 oz (64.2 kg)   SpO2 98%   BMI 22.17 kg/m²     Gen/overall appearance: Not in acute distress. Alert. Head: Normocephalic, atraumatic  Eyes: EOMI, no scleral icterus  CVS: regular rate and rhythm, Normal S1S2  Pulm: Clear to auscultation bilaterally. No crackles/wheezes  Gastrointestinal: Soft, nontender, nondistended, no guarding or rebound  Extremities: No edema.  No erythema or warmth  Neuro: No gross focal deficits noted, confused  Skin: Warm, dry    Labs:   Recent Labs     07/23/20 0435 07/24/20 0449 07/25/20  0527   WBC 2.3* 2.1* 2.5*   HGB 13.3* 14.0 13.3*   HCT 38.7* 40.4* 38.4*   PLT 71* 96* 125*     Recent Labs     07/23/20 0435 07/24/20 0449 07/25/20  0527   * 136 138   K 3.7 3.6 3.6   CL 97* 99 102   CO2 25 25 26   BUN 9 10 10   CREATININE 0.7* 0.7* 0.7*   CALCIUM 9.2 9.3 9.4     No results for input(s): AST, ALT, BILIDIR, BILITOT, ALKPHOS in the last 72 hours. No results for input(s): INR in the last 72 hours. No results for input(s): Norvel Carlos in the last 72 hours. Assessment/Plan:    Active Hospital Problems    Diagnosis Date Noted    Altered mental status [R41.82] 07/20/2020     Questionable alcohol withdrawal seizure. Pt does deny alcohol use for the past month, but fairly poor historian  AMS. Likely acute metabolic encephalopathy 2/2 above?  - CT head non-acute  - EtOH level 0 on admission  - seizures precautions  - prn ativan for seizures  - CIWAs protocol  - c/w thiamine and folic acid  - s/p gentle IVF hydration  - monitor and replace lytes  - MRI brain nonacute  - b12, TSH ok  - PTOT     Asymptomatic bacteruria.   UCx with mixed isra  - IV rocephin d/farideh     PMH of alcohol abuse, htn    Diet: DIET GENERAL;  Code Status: Full Code    Dispo - Inpt, d/c planning    Wilmer Beck MD

## 2020-07-26 VITALS
DIASTOLIC BLOOD PRESSURE: 84 MMHG | WEIGHT: 140.43 LBS | HEIGHT: 67 IN | SYSTOLIC BLOOD PRESSURE: 116 MMHG | RESPIRATION RATE: 16 BRPM | BODY MASS INDEX: 22.04 KG/M2 | OXYGEN SATURATION: 99 % | HEART RATE: 84 BPM | TEMPERATURE: 98.1 F

## 2020-07-26 LAB
ANION GAP SERPL CALCULATED.3IONS-SCNC: 12 MMOL/L (ref 3–16)
BUN BLDV-MCNC: 10 MG/DL (ref 7–20)
CALCIUM SERPL-MCNC: 9.6 MG/DL (ref 8.3–10.6)
CHLORIDE BLD-SCNC: 102 MMOL/L (ref 99–110)
CO2: 25 MMOL/L (ref 21–32)
CREAT SERPL-MCNC: 0.8 MG/DL (ref 0.9–1.3)
GFR AFRICAN AMERICAN: >60
GFR NON-AFRICAN AMERICAN: >60
GLUCOSE BLD-MCNC: 98 MG/DL (ref 70–99)
HCT VFR BLD CALC: 38.3 % (ref 40.5–52.5)
HEMOGLOBIN: 13.3 G/DL (ref 13.5–17.5)
MCH RBC QN AUTO: 34.3 PG (ref 26–34)
MCHC RBC AUTO-ENTMCNC: 34.8 G/DL (ref 31–36)
MCV RBC AUTO: 98.6 FL (ref 80–100)
PDW BLD-RTO: 12.2 % (ref 12.4–15.4)
PLATELET # BLD: 157 K/UL (ref 135–450)
PMV BLD AUTO: 7.2 FL (ref 5–10.5)
POTASSIUM REFLEX MAGNESIUM: 3.9 MMOL/L (ref 3.5–5.1)
RBC # BLD: 3.88 M/UL (ref 4.2–5.9)
SODIUM BLD-SCNC: 139 MMOL/L (ref 136–145)
WBC # BLD: 2.5 K/UL (ref 4–11)

## 2020-07-26 PROCEDURE — 36415 COLL VENOUS BLD VENIPUNCTURE: CPT

## 2020-07-26 PROCEDURE — 97165 OT EVAL LOW COMPLEX 30 MIN: CPT

## 2020-07-26 PROCEDURE — 6370000000 HC RX 637 (ALT 250 FOR IP): Performed by: INTERNAL MEDICINE

## 2020-07-26 PROCEDURE — 80048 BASIC METABOLIC PNL TOTAL CA: CPT

## 2020-07-26 PROCEDURE — 2580000003 HC RX 258: Performed by: INTERNAL MEDICINE

## 2020-07-26 PROCEDURE — 85027 COMPLETE CBC AUTOMATED: CPT

## 2020-07-26 PROCEDURE — 97161 PT EVAL LOW COMPLEX 20 MIN: CPT

## 2020-07-26 RX ADMIN — Medication 10 ML: at 09:30

## 2020-07-26 RX ADMIN — Medication 100 MG: at 09:23

## 2020-07-26 RX ADMIN — PANTOPRAZOLE SODIUM 40 MG: 40 TABLET, DELAYED RELEASE ORAL at 05:04

## 2020-07-26 RX ADMIN — THERA TABS 1 TABLET: TAB at 09:23

## 2020-07-26 RX ADMIN — AMLODIPINE BESYLATE 10 MG: 5 TABLET ORAL at 09:23

## 2020-07-26 RX ADMIN — FOLIC ACID 1 MG: 1 TABLET ORAL at 09:23

## 2020-07-26 ASSESSMENT — PAIN SCALES - GENERAL
PAINLEVEL_OUTOF10: 0

## 2020-07-26 NOTE — PROGRESS NOTES
Occupational Therapy   Occupational Therapy Initial Assessment and Discharge Summary   Date: 2020   Patient Name: Dev Noland  MRN: [id number]     : 1975    Date of Service: 2020    Discharge Recommendations: Dev Noland scored a  on the [facility] ADL Inpatient form. At this time, no further OT is recommended upon discharge due to indep mobility and ADLs. Recommend patient returns to prior setting with prior services. OT Equipment Recommendations  Equipment Needed: No    Assessment   Assessment: Patient indep and at baseline  Treatment Diagnosis: AMS  Prognosis: Good  Decision Making: Low Complexity  History: indep ADLs and mobility  Exam: Indep all mobility, commode transfers  Assistance / Modification: Seizure precautions in bed  OT Education: OT Role  Patient Education: POC  Barriers to Learning: Patient understood therapy will not be following due to indep  No Skilled OT: Independent with functional mobility; Independent with ADL's;At baseline function; Safe to return home; No OT goals identified  REQUIRES OT FOLLOW UP: No  Activity Tolerance  Activity Tolerance: Patient Tolerated treatment well  Safety Devices  Safety Devices in place: Yes  Type of devices: All fall risk precautions in place; Left in bed;Nurse notified;Call light within reach(all bedrails up and pads on bedrails-- seizure precautions)           Patient Diagnosis(es): The primary encounter diagnosis was Altered mental status, unspecified altered mental status type. Diagnoses of Urinary tract infection in male, History of alcohol abuse, and Thrombocytopenia ([address] were also pertinent to this visit. has a past medical history of Alcohol abuse with physiological dependence ([address] and HTN (hypertension). has a past surgical history that includes Upper gastrointestinal endoscopy (N/A, 2020).     Treatment Diagnosis: AMS      Restrictions  Restrictions/Precautions  Restrictions/Precautions: Seizure, Fall Risk  Position Activity Restriction  Other position/activity restrictions: Joseph Cardenas is a 38 y.o. male who presents emergency department with concern for altered mental status. According to EMS the patient was found lying on the ground by his sister-in-law. She reports that he has been sick for a couple days, however there is no further information about what his symptoms were. In the emergency department he is alert to self only. He seems to mumble under his breath when asked questions and is difficult to tell whether or not he is neurologically intact. He does seem to follow some commands but others he ignores. Alcohol withdrawal seizure, UTI    Subjective   General  Chart Reviewed: Yes  Family / Caregiver Present: No  Diagnosis: AMS  Subjective  Subjective: Patient supine in bed, pleasant and cooperative. Patient Currently in Pain: Denies  Vital Signs  Patient Currently in Pain: Denies      Social/Functional History  Social/Functional History  Lives With: Friend(s)  Ambulation Assistance: Independent  Transfer Assistance: Independent       Objective              Balance  Sitting Balance: Independent  Standing Balance: Independent  Standing Balance  Activity: Indep ambulation in room and hallway. 150 feet  ADL  Additional Comments: Patient is 41years old, fully indep, indep walking and indep into bathroom and commode. Tone RUE  RUE Tone: Normotonic  Tone LUE  LUE Tone: Normotonic  Coordination  Movements Are Fluid And Coordinated: Yes     Bed mobility  Supine to Sit: Independent  Sit to Supine: Independent  Scooting: Independent  Transfers  Sit to stand: Independent  Stand to sit:  Independent  Vision - Basic Assessment  Prior Vision: No visual deficits  Cognition  Overall Cognitive Status: WNL                 LUE AROM (degrees)  LUE AROM : WNL  Left Hand AROM (degrees)  Left Hand AROM: WNL  RUE AROM (degrees)  RUE AROM : WNL  LUE Strength  Gross LUE Strength: WNL;WFL  RUE Strength  Gross RUE Strength: [facility]        G-Code     OutComes Score                                                  AM-PAC Score        AM-PAC Inpatient Daily Activity Raw Score: 24 (07/26/20 0901)  AM-PAC Inpatient ADL T-Scale Score : 57.54 (07/26/20 0901)  ADL Inpatient CMS 0-100% Score: 0 (07/26/20 0901)  ADL Inpatient CMS G-Code Modifier : [facility] (07/26/20 0901)    Goals  Patient Goals   Patient goals : Go home       Therapy Time   Individual Concurrent Group Co-treatment   Time In 0845         Time Out 0853         Minutes 8               Timed Code Treatment Minutes: 0      Total Treatment Minutes: [address], OTR/L [address]

## 2020-07-26 NOTE — DISCHARGE SUMMARY
Hospital Medicine Discharge Summary    Patient ID: Alise Reid      Patient's PCP: No primary care provider on file. Admit Date: 7/20/2020     Discharge Date: 7/26/2020    Admitting Physician: Esther Mohan MD     Discharge Physician: Rojas Gibson MD     Discharge Diagnoses and Hospital Course: Active Hospital Problems    Diagnosis Date Noted    Altered mental status [R41.82] 07/20/2020     Questionable alcohol withdrawal seizure.  Pt does deny alcohol use for the past month, but fairly poor historian  AMS. Bonnie Malone acute metabolic encephalopathy 2/2 above? Now resolved  - CT head non-acute  - EtOH level 0 on admission  - seizures precautions  - prn ativan for seizures  - CIWAs protocol  - c/w thiamine and folic acid  - s/p gentle IVF hydration  - monitor and replace lytes  - MRI brain nonacute  - b12, TSH ok  - PTOT  - clinically symptoms resolved and back to baseline     Asymptomatic bacteruria. UCx with mixed isra  - empiric IV rocephin now d/farideh     PMH of alcohol abuse, htn    The patient was seen and examined on day of discharge and this discharge summary is in conjunction with any daily progress note from day of discharge. Exam:     /84   Pulse 84   Temp 98.1 °F (36.7 °C) (Oral)   Resp 16   Ht 5' 7\" (1.702 m)   Wt 140 lb 6.9 oz (63.7 kg)   SpO2 99%   BMI 21.99 kg/m²     Gen/overall appearance: Not in acute distress. Alert. Head: Normocephalic, atraumatic  Eyes: EOMI, no scleral icterus  CVS: regular rate and rhythm, Normal S1S2  Pulm: Clear to auscultation bilaterally. No crackles/wheezes  Gastrointestinal: Soft, nontender, nondistended, no guarding or rebound  Extremities: No edema.  No erythema or warmth  Neuro: No gross focal deficits noted  Skin: Warm, dry, turgor normal      Consults:     IP CONSULT TO HOSPITALIST  IP CONSULT TO SOCIAL WORK    Disposition:  home     Condition:  Stable    Discharge Instructions/Follow-up:   Pt to follow up with PCP within 1 week  Consultants as scheduled    Code Status:  Full Code    Activity: activity as tolerated    Diet: regular diet    Labs: For convenience and continuity at follow-up the following most recent labs are provided:      CBC:    Lab Results   Component Value Date    WBC 2.5 07/26/2020    HGB 13.3 07/26/2020    HCT 38.3 07/26/2020     07/26/2020       Renal:    Lab Results   Component Value Date     07/26/2020    K 3.9 07/26/2020     07/26/2020    CO2 25 07/26/2020    BUN 10 07/26/2020    CREATININE 0.8 07/26/2020    CALCIUM 9.6 07/26/2020    PHOS 3.2 06/12/2020       Discharge Medications:     Discharge Medication List as of 7/26/2020  1:29 PM           Details   amLODIPine (NORVASC) 10 MG tablet Take 1 tablet by mouth daily, Disp-30 tablet, [id number]      folic acid (FOLVITE) 1 MG tablet Take 1 tablet by mouth daily, Disp-30 tablet, R-3Normal      pantoprazole (PROTONIX) 40 MG tablet Take 1 tablet by mouth every morning (before breakfast), Disp-30 tablet, R-3Normal      vitamin B-1 100 MG tablet Take 1 tablet by mouth daily, Disp-30 tablet, R-3Normal      Multiple Vitamin (MULTIVITAMIN) TABS tablet Take 1 tablet by mouth dailyHistorical Med      ondansetron (ZOFRAN ODT) 4 MG disintegrating tablet Take 1 tablet by mouth every 8 hours as needed for Nausea, Disp-20 tablet, R-0Print             Time Spent on discharge is more than 45 minutes in the examination, evaluation, counseling and review of medications and discharge plan. Signed:    Carmen Lujan MD   7/26/2020    Thank you No primary care provider on file. for the opportunity to be involved in this patient's care.

## 2020-07-26 NOTE — PROGRESS NOTES
VSS, assessment as charted. Denies pain, assisted to BR for void, returned to bed. Denies other needs, reminded to call for assist for any OOB activity, VU.

## 2020-07-26 NOTE — PROGRESS NOTES
Physical Therapy    Facility/Department: [address]  Initial Assessment/Discharge Summary    NAME: Sheri Marroquin  : 1975  MRN: [id number]    Date of Service: 2020    Discharge Recommendations:    Sheri Marroquin scored a 24/ on the AM-PAC short mobility form. At this time, no further PT is recommended upon discharge due to independence with mobility. Recommend patient returns to prior setting with prior services. PT Equipment Recommendations  Equipment Needed: No    Assessment   Assessment: Patient independent with functional mobility - no further acute PT needs  Decision Making: Low Complexity  Clinical Presentation: stable  PT Education: PT Role  Patient Education: verbalized understanding - language barrier  Barriers to Learning: language  REQUIRES PT FOLLOW UP: No  Activity Tolerance  Activity Tolerance: Patient Tolerated treatment well       Patient Diagnosis(es): The primary encounter diagnosis was Altered mental status, unspecified altered mental status type. Diagnoses of Urinary tract infection in male, History of alcohol abuse, and Thrombocytopenia ([address] were also pertinent to this visit. has a past medical history of Alcohol abuse with physiological dependence ([address] and HTN (hypertension). has a past surgical history that includes Upper gastrointestinal endoscopy (N/A, 2020). Restrictions  Restrictions/Precautions  Restrictions/Precautions: Seizure, Fall Risk  Position Activity Restriction  Other position/activity restrictions: Sheri Marroquin is a 38 y.o. male who presents emergency department with concern for altered mental status. According to EMS the patient was found lying on the ground by his sister-in-law. She reports that he has been sick for a couple days, however there is no further information about what his symptoms were. In the emergency department he is alert to self only.   He seems to mumble under his breath when asked questions and is difficult to tell whether or not he is neurologically intact. He does seem to follow some commands but others he ignores. Alcohol withdrawal seizure, UTI  Vision/Hearing  Vision: Within Functional Limits  Hearing: Within functional limits     Subjective  General  Chart Reviewed: Yes  Family / Caregiver Present: No  Diagnosis: altered mental status  Pain Screening  Patient Currently in Pain: Denies          Orientation  Orientation  Overall Orientation Status: (difficult to assess due to language barrier - no confusion noted)  Social/Functional History  Social/Functional History  Lives With: Friend(s)  Ambulation Assistance: Independent  Transfer Assistance: Independent  Additional Comments: difficult to obtain information due to language barrier    Objective          AROM RLE (degrees)  RLE AROM: WFL  AROM LLE (degrees)  LLE AROM : WFL  Strength RLE  Strength RLE: WFL  Strength LLE  Strength LLE: WFL        Bed mobility  Supine to Sit: Independent  Sit to Supine: Independent  Scooting: Independent  Transfers  Sit to Stand: Independent  Stand to sit: Independent  Ambulation  Ambulation?: Yes  Ambulation 1  Surface: level tile  Device: No Device  Assistance: Independent  Quality of Gait: steady gait, increased amee  Distance: 150'     Balance  Sitting - Static: Good  Sitting - Dynamic: Good  Standing - Static: Good  Standing - Dynamic: Good        Plan   Safety Devices  Type of devices:  All fall risk precautions in place, Nurse notified, Left in bed, Call light within reach  Restraints  Initially in place: No           AM-PAC Score  AM-PAC Inpatient Mobility Raw Score : 24 (07/26/20 0905)  AM-PAC Inpatient T-Scale Score : 61.14 (07/26/20 0905)  Mobility Inpatient CMS 0-100% Score: 0 (07/26/20 0905)  Mobility Inpatient CMS G-Code Modifier : [facility] (07/26/20 1181)          Goals  Short term goals  Time Frame for Short term goals: No acute PT goals       Therapy Time   Individual Concurrent Group Co-treatment   Time In 0845 Time Out 0853         Minutes 8         Timed Code Treatment Minutes: 0 Minutes       Yony Lawton, PT    Thanks, Yony Lawton, PT, DPT [id number]

## 2020-07-26 NOTE — PROGRESS NOTES
VSS, assisted to BR for void, returned to bed. Requested another face mask, as he lost his previous one, same provided, denies other needs @present.

## 2020-07-26 NOTE — PROGRESS NOTES
Pt d/c'd home with self. Removed R PIV and stopped bleeding. Catheter intact. Pt tolerated well. No redness noted at site. Notified [facility] and removed tele box. Reviewed d/c instructions, home meds, and  f/u information utilizing teach-back method. No new scripts sent for or given to pt at discharge. Patient verbalized understanding.

## 2020-07-26 NOTE — PROGRESS NOTES
Awakened for VS which are stable, assisted to BR for void, oral care. Requesting 5 packs of teresa crackers and 4 packs of sugar for tea kept from one of his meal trays. Advised we need to wait until lab draws his blood so as not to skew the results, then I will bring it in.

## 2020-08-13 ENCOUNTER — HOSPITAL ENCOUNTER (INPATIENT)
Age: 45
LOS: 7 days | Discharge: HOME | DRG: 241 | End: 2020-08-20
Attending: EMERGENCY MEDICINE | Admitting: INTERNAL MEDICINE
Payer: MEDICAID

## 2020-08-13 ENCOUNTER — APPOINTMENT (OUTPATIENT)
Dept: CT IMAGING | Age: 45
DRG: 241 | End: 2020-08-13
Payer: MEDICAID

## 2020-08-13 PROBLEM — K29.20 ACUTE ALCOHOLIC GASTRITIS WITHOUT HEMORRHAGE: Status: ACTIVE | Noted: 2020-08-13

## 2020-08-13 LAB
A/G RATIO: 1 (ref 1.1–2.2)
ALBUMIN SERPL-MCNC: 4.4 G/DL (ref 3.4–5)
ALP BLD-CCNC: 104 U/L (ref 40–129)
ALT SERPL-CCNC: 62 U/L (ref 10–40)
ANION GAP SERPL CALCULATED.3IONS-SCNC: 17 MMOL/L (ref 3–16)
AST SERPL-CCNC: 95 U/L (ref 15–37)
BASOPHILS ABSOLUTE: 0 K/UL (ref 0–0.2)
BASOPHILS RELATIVE PERCENT: 1.4 %
BILIRUB SERPL-MCNC: 0.5 MG/DL (ref 0–1)
BILIRUBIN URINE: NEGATIVE
BLOOD, URINE: NEGATIVE
BUN BLDV-MCNC: 11 MG/DL (ref 7–20)
CALCIUM SERPL-MCNC: 9.2 MG/DL (ref 8.3–10.6)
CHLORIDE BLD-SCNC: 94 MMOL/L (ref 99–110)
CLARITY: CLEAR
CO2: 26 MMOL/L (ref 21–32)
COLOR: YELLOW
CREAT SERPL-MCNC: 0.8 MG/DL (ref 0.9–1.3)
EOSINOPHILS ABSOLUTE: 0 K/UL (ref 0–0.6)
EOSINOPHILS RELATIVE PERCENT: 0.9 %
EPITHELIAL CELLS, UA: 0 /HPF (ref 0–5)
ETHANOL: 389 MG/DL (ref 0–0.08)
GFR AFRICAN AMERICAN: >60
GFR NON-AFRICAN AMERICAN: >60
GLOBULIN: 4.3 G/DL
GLUCOSE BLD-MCNC: 108 MG/DL (ref 70–99)
GLUCOSE URINE: NEGATIVE MG/DL
HCT VFR BLD CALC: 42.2 % (ref 40.5–52.5)
HEMOGLOBIN: 14.9 G/DL (ref 13.5–17.5)
HYALINE CASTS: 1 /LPF (ref 0–8)
KETONES, URINE: NEGATIVE MG/DL
LEUKOCYTE ESTERASE, URINE: NEGATIVE
LIPASE: 38 U/L (ref 13–60)
LYMPHOCYTES ABSOLUTE: 1.2 K/UL (ref 1–5.1)
LYMPHOCYTES RELATIVE PERCENT: 51.3 %
MCH RBC QN AUTO: 33.9 PG (ref 26–34)
MCHC RBC AUTO-ENTMCNC: 35.3 G/DL (ref 31–36)
MCV RBC AUTO: 96.1 FL (ref 80–100)
MICROSCOPIC EXAMINATION: YES
MONOCYTES ABSOLUTE: 0.2 K/UL (ref 0–1.3)
MONOCYTES RELATIVE PERCENT: 9.2 %
NEUTROPHILS ABSOLUTE: 0.9 K/UL (ref 1.7–7.7)
NEUTROPHILS RELATIVE PERCENT: 37.2 %
NITRITE, URINE: NEGATIVE
PDW BLD-RTO: 12.6 % (ref 12.4–15.4)
PH UA: 6 (ref 5–8)
PLATELET # BLD: 90 K/UL (ref 135–450)
PMV BLD AUTO: 7.6 FL (ref 5–10.5)
POTASSIUM SERPL-SCNC: 3.7 MMOL/L (ref 3.5–5.1)
PROTEIN UA: 100 MG/DL
RBC # BLD: 4.39 M/UL (ref 4.2–5.9)
RBC UA: 1 /HPF (ref 0–4)
SLIDE REVIEW: ABNORMAL
SODIUM BLD-SCNC: 137 MMOL/L (ref 136–145)
SPECIFIC GRAVITY UA: >1.03 (ref 1–1.03)
TOTAL PROTEIN: 8.7 G/DL (ref 6.4–8.2)
URINE REFLEX TO CULTURE: ABNORMAL
URINE TYPE: ABNORMAL
UROBILINOGEN, URINE: 1 E.U./DL
WBC # BLD: 2.3 K/UL (ref 4–11)
WBC UA: 1 /HPF (ref 0–5)

## 2020-08-13 PROCEDURE — 83690 ASSAY OF LIPASE: CPT

## 2020-08-13 PROCEDURE — 85025 COMPLETE CBC W/AUTO DIFF WBC: CPT

## 2020-08-13 PROCEDURE — 2500000003 HC RX 250 WO HCPCS: Performed by: PHYSICIAN ASSISTANT

## 2020-08-13 PROCEDURE — 6360000002 HC RX W HCPCS: Performed by: PHYSICIAN ASSISTANT

## 2020-08-13 PROCEDURE — 74177 CT ABD & PELVIS W/CONTRAST: CPT

## 2020-08-13 PROCEDURE — 80053 COMPREHEN METABOLIC PANEL: CPT

## 2020-08-13 PROCEDURE — 96374 THER/PROPH/DIAG INJ IV PUSH: CPT

## 2020-08-13 PROCEDURE — 6360000004 HC RX CONTRAST MEDICATION: Performed by: PHYSICIAN ASSISTANT

## 2020-08-13 PROCEDURE — 2580000003 HC RX 258: Performed by: PHYSICIAN ASSISTANT

## 2020-08-13 PROCEDURE — 96375 TX/PRO/DX INJ NEW DRUG ADDON: CPT

## 2020-08-13 PROCEDURE — 81003 URINALYSIS AUTO W/O SCOPE: CPT

## 2020-08-13 PROCEDURE — 2060000000 HC ICU INTERMEDIATE R&B

## 2020-08-13 PROCEDURE — G0480 DRUG TEST DEF 1-7 CLASSES: HCPCS

## 2020-08-13 PROCEDURE — 99285 EMERGENCY DEPT VISIT HI MDM: CPT

## 2020-08-13 PROCEDURE — C9113 INJ PANTOPRAZOLE SODIUM, VIA: HCPCS | Performed by: PHYSICIAN ASSISTANT

## 2020-08-13 RX ORDER — LORAZEPAM 2 MG/ML
2 INJECTION INTRAMUSCULAR
Status: DISCONTINUED | OUTPATIENT
Start: 2020-08-13 | End: 2020-08-14 | Stop reason: HOSPADM

## 2020-08-13 RX ORDER — KETOROLAC TROMETHAMINE 30 MG/ML
30 INJECTION, SOLUTION INTRAMUSCULAR; INTRAVENOUS ONCE
Status: COMPLETED | OUTPATIENT
Start: 2020-08-13 | End: 2020-08-13

## 2020-08-13 RX ORDER — ONDANSETRON 2 MG/ML
4 INJECTION INTRAMUSCULAR; INTRAVENOUS ONCE
Status: COMPLETED | OUTPATIENT
Start: 2020-08-13 | End: 2020-08-13

## 2020-08-13 RX ORDER — LORAZEPAM 1 MG/1
4 TABLET ORAL
Status: DISCONTINUED | OUTPATIENT
Start: 2020-08-13 | End: 2020-08-14 | Stop reason: HOSPADM

## 2020-08-13 RX ORDER — LORAZEPAM 2 MG/ML
1 INJECTION INTRAMUSCULAR
Status: DISCONTINUED | OUTPATIENT
Start: 2020-08-13 | End: 2020-08-14 | Stop reason: HOSPADM

## 2020-08-13 RX ORDER — LORAZEPAM 1 MG/1
1 TABLET ORAL
Status: DISCONTINUED | OUTPATIENT
Start: 2020-08-13 | End: 2020-08-14 | Stop reason: HOSPADM

## 2020-08-13 RX ORDER — LORAZEPAM 2 MG/ML
3 INJECTION INTRAMUSCULAR
Status: DISCONTINUED | OUTPATIENT
Start: 2020-08-13 | End: 2020-08-14 | Stop reason: HOSPADM

## 2020-08-13 RX ORDER — PANTOPRAZOLE SODIUM 40 MG/10ML
40 INJECTION, POWDER, LYOPHILIZED, FOR SOLUTION INTRAVENOUS ONCE
Status: COMPLETED | OUTPATIENT
Start: 2020-08-13 | End: 2020-08-13

## 2020-08-13 RX ORDER — LORAZEPAM 1 MG/1
2 TABLET ORAL
Status: DISCONTINUED | OUTPATIENT
Start: 2020-08-13 | End: 2020-08-14 | Stop reason: HOSPADM

## 2020-08-13 RX ORDER — LORAZEPAM 2 MG/ML
4 INJECTION INTRAMUSCULAR
Status: DISCONTINUED | OUTPATIENT
Start: 2020-08-13 | End: 2020-08-14 | Stop reason: HOSPADM

## 2020-08-13 RX ORDER — LORAZEPAM 1 MG/1
3 TABLET ORAL
Status: DISCONTINUED | OUTPATIENT
Start: 2020-08-13 | End: 2020-08-14 | Stop reason: HOSPADM

## 2020-08-13 RX ADMIN — PANTOPRAZOLE SODIUM 40 MG: 40 INJECTION, POWDER, FOR SOLUTION INTRAVENOUS at 17:13

## 2020-08-13 RX ADMIN — IOPAMIDOL 75 ML: 755 INJECTION, SOLUTION INTRAVENOUS at 17:12

## 2020-08-13 RX ADMIN — FOLIC ACID: 5 INJECTION, SOLUTION INTRAMUSCULAR; INTRAVENOUS; SUBCUTANEOUS at 17:13

## 2020-08-13 RX ADMIN — ONDANSETRON 4 MG: 2 INJECTION INTRAMUSCULAR; INTRAVENOUS at 17:13

## 2020-08-13 RX ADMIN — KETOROLAC TROMETHAMINE 30 MG: 30 INJECTION, SOLUTION INTRAMUSCULAR at 17:13

## 2020-08-13 ASSESSMENT — ENCOUNTER SYMPTOMS
RESPIRATORY NEGATIVE: 1
CONSTIPATION: 0
SHORTNESS OF BREATH: 0
VOMITING: 1
COUGH: 0
ABDOMINAL PAIN: 1
COLOR CHANGE: 0
NAUSEA: 1
CHEST TIGHTNESS: 0
DIARRHEA: 0
BACK PAIN: 0

## 2020-08-13 ASSESSMENT — PAIN SCALES - GENERAL
PAINLEVEL_OUTOF10: 7
PAINLEVEL_OUTOF10: 5

## 2020-08-13 ASSESSMENT — PAIN DESCRIPTION - LOCATION: LOCATION: ABDOMEN

## 2020-08-13 ASSESSMENT — PAIN DESCRIPTION - PAIN TYPE: TYPE: ACUTE PAIN

## 2020-08-13 NOTE — ED PROVIDER NOTES
[facility]        Pt Name: Nagi Bailey  MRN: [id number]  [place] 1975  Date of evaluation: 8/13/2020  Provider: HECTOR Walls  PCP: No primary care provider on file. I have seen and evaluated this patient with my supervising physician Gaurang Joseph, [facility]       Chief Complaint   Patient presents with    Abdominal Pain     pt states he began having abd pain with n.v and headache that started this afternoon       HISTORY OF PRESENT ILLNESS   (Location, Timing/Onset, Context/Setting, Quality, Duration, Modifying Factors, Severity, Associated Signs and Symptoms)  Note limiting factors. Nagi Bailey is a 41 y.o. male with past medical history of hypertension and documented alcohol abuse who presents to the ED with complaint of abdominal pain. Patient states he is having some lower abdominal pain with associated nausea and vomiting. States associated headache as well. States started this afternoon. Denies any dysuria, frequency, urgency, hematuria, testicular pain/swelling or changes in bowel movements. Denies fever chills. Denies sick contacts or recent travel. Denies chest pain or shortness of breath. Denies rashes or lesions. Denies any surgeries to the abdomen the past.  Became concerned and came to the ED for further evaluation and treatment. States sharp pain rated 5/10. Nursing Notes were all reviewed and agreed with or any disagreements were addressed in the HPI. REVIEW OF SYSTEMS    (2-9 systems for level 4, 10 or more for level 5)     Review of Systems   Constitutional: Positive for appetite change. Negative for activity change, chills, diaphoresis, fatigue and fever. Respiratory: Negative. Negative for cough, chest tightness and shortness of breath. Cardiovascular: Negative. Negative for chest pain, palpitations and leg swelling.    Gastrointestinal: Positive for abdominal pain, nausea and vomiting. Negative for constipation and diarrhea. Genitourinary: Negative for decreased urine volume, difficulty urinating, dysuria, flank pain, frequency, hematuria, penile pain and urgency. Musculoskeletal: Negative for arthralgias, back pain, myalgias, neck pain and neck stiffness. Skin: Negative for color change, pallor, rash and wound. Neurological: Negative for dizziness, light-headedness and headaches. Positives and Pertinent negatives as per HPI. Except as noted above in the ROS, all other systems were reviewed and negative. PAST MEDICAL HISTORY     Past Medical History:   Diagnosis Date    Alcohol abuse with physiological dependence ([facility] [address]     HTN (hypertension)          SURGICAL HISTORY     Past Surgical History:   Procedure Laterality Date    UPPER GASTROINTESTINAL ENDOSCOPY N/A 6/12/2020    EGD BIOPSY performed by Mónica Rivas MD at [address]       Previous Medications    AMLODIPINE (NORVASC) 10 MG TABLET    Take 1 tablet by mouth daily    FOLIC ACID (FOLVITE) 1 MG TABLET    Take 1 tablet by mouth daily    MULTIPLE VITAMIN (MULTIVITAMIN) TABS TABLET    Take 1 tablet by mouth daily    ONDANSETRON (ZOFRAN ODT) 4 MG DISINTEGRATING TABLET    Take 1 tablet by mouth every 8 hours as needed for Nausea    PANTOPRAZOLE (PROTONIX) 40 MG TABLET    Take 1 tablet by mouth every morning (before breakfast)    VITAMIN B-1 100 MG TABLET    Take 1 tablet by mouth daily         ALLERGIES     Patient has no known allergies. FAMILYHISTORY       Family History   Family history unknown: Yes          SOCIAL HISTORY       Social History     Tobacco Use    Smoking status: Former Smoker    Smokeless tobacco: Never Used    Tobacco comment: one a day    Substance Use Topics    Alcohol use:  Yes     Alcohol/week: 2.0 standard drinks     Types: 2 Cans of beer per week     Comment: socially    Drug use: Never       SCREENINGS             PHYSICAL WBC 2.3 (*)     Platelets 90 (*)     Neutrophils Absolute 0.9 (*)     All other components within normal limits    Narrative:     Duc FINLEYERF tel. [phone],  Hematology results called to and read back by RN see below, 08/13/2020 15:39,  by [facility]  Hematology results called to and read back by RN, Yannick Longo, 08/13/2020  15:30, by [facility]  Performed at:  [facility] [address] [employer]   Phone [phone]   COMPREHENSIVE METABOLIC PANEL - Abnormal; Notable for the following components:    Chloride 94 (*)     Anion Gap 17 (*)     Glucose 108 (*)     CREATININE 0.8 (*)     Total Protein 8.7 (*)     Albumin/Globulin Ratio 1.0 (*)     ALT 62 (*)     AST 95 (*)     All other components within normal limits    Narrative:     Performed at:  [facility] [address] [facility] [address] [employer], [facility]   Phone [phone]   URINE RT REFLEX TO CULTURE - Abnormal; Notable for the following components:    Protein,  (*)     All other components within normal limits    Narrative:     Performed at:  [facility] [address] [place] [employer], [facility]   Phone [phone]   LIPASE    Narrative:     Performed at:  [facility] [address], [facility]   Phone [phone]   ETHANOL    Narrative:     Performed at:  [facility] [address],  [place], [facility]   Phone [phone]   MICROSCOPIC URINALYSIS    Narrative:     Performed at:  [facility] [address] [facility]   Phone [phone]       All other labs were within normal range or not returned as of this dictation. EKG: All EKG's are interpreted by the Emergency Department Physician in the absence of a cardiologist.  Please see their note for interpretation of EKG.       RADIOLOGY:   Non-plain film images such as Hemoglobin normal.  CMP showed ALT of 62 and AST of 95. Anion gap of 17. Lipase normal.  Ethanol 389. CT of the abdomen and pelvis with IV contrast showed no acute abnormality other than hepatic steatosis. Given history and physical examination suffering from alcohol intoxication with associated Paulo pain. Upon repeat evaluation patient states he desperately wants to stop drinking. Has had withdrawal seizures and problems in the past.  Patient currently intoxicated at this time and no withdrawal symptoms but patient wants to stop drinking and concern if he attempts to stop drinking will have significant withdrawal symptoms. Patient will benefit from mission for further evaluation and treatment. Will discuss with hospitalist service. Will sign to attending provider given end of shift. Please see attending provider note for further documentation and disposition of patient. FINAL IMPRESSION      1. Acute alcoholic intoxication with complication ([address]    2. Generalized abdominal pain          DISPOSITION/PLAN   DISPOSITION Decision To Admit 08/13/2020 10:46:24 PM      PATIENT REFERREDTO:  No follow-up provider specified.     DISCHARGE MEDICATIONS:  New Prescriptions    No medications on file       DISCONTINUED MEDICATIONS:  Discontinued Medications    No medications on file              (Please note that portions of this note were completed with a voice recognition program.  Efforts were made to edit the dictations but occasionally words are mis-transcribed.)    HECTOR Malone (electronically signed)          HECTOR Foster  08/13/20 3892

## 2020-08-13 NOTE — ED NOTES
Bed: 22  Expected date:   Expected time:   Means of arrival:   Comments:  Angelina Burroughs RN  08/13/20 9821

## 2020-08-14 PROBLEM — R79.89 ELEVATED LFTS: Status: ACTIVE | Noted: 2020-08-14

## 2020-08-14 PROBLEM — Z78.9 FREQUENT HOSPITAL ADMISSIONS: Status: ACTIVE | Noted: 2020-08-14

## 2020-08-14 PROBLEM — I10 ESSENTIAL HYPERTENSION: Status: ACTIVE | Noted: 2020-08-14

## 2020-08-14 LAB
HEMATOLOGY PATH CONSULT: NORMAL
MAGNESIUM: 1.4 MG/DL (ref 1.8–2.4)
PHOSPHORUS: 3.2 MG/DL (ref 2.5–4.9)

## 2020-08-14 PROCEDURE — 2580000003 HC RX 258: Performed by: INTERNAL MEDICINE

## 2020-08-14 PROCEDURE — 83735 ASSAY OF MAGNESIUM: CPT

## 2020-08-14 PROCEDURE — 97530 THERAPEUTIC ACTIVITIES: CPT

## 2020-08-14 PROCEDURE — 97161 PT EVAL LOW COMPLEX 20 MIN: CPT

## 2020-08-14 PROCEDURE — 6360000002 HC RX W HCPCS: Performed by: NURSE PRACTITIONER

## 2020-08-14 PROCEDURE — 84100 ASSAY OF PHOSPHORUS: CPT

## 2020-08-14 PROCEDURE — 2580000003 HC RX 258: Performed by: NURSE PRACTITIONER

## 2020-08-14 PROCEDURE — 2500000003 HC RX 250 WO HCPCS: Performed by: INTERNAL MEDICINE

## 2020-08-14 PROCEDURE — 36415 COLL VENOUS BLD VENIPUNCTURE: CPT

## 2020-08-14 PROCEDURE — 97116 GAIT TRAINING THERAPY: CPT

## 2020-08-14 PROCEDURE — 97165 OT EVAL LOW COMPLEX 30 MIN: CPT

## 2020-08-14 PROCEDURE — 6370000000 HC RX 637 (ALT 250 FOR IP): Performed by: INTERNAL MEDICINE

## 2020-08-14 PROCEDURE — 2060000000 HC ICU INTERMEDIATE R&B

## 2020-08-14 PROCEDURE — 6360000002 HC RX W HCPCS: Performed by: INTERNAL MEDICINE

## 2020-08-14 PROCEDURE — C9113 INJ PANTOPRAZOLE SODIUM, VIA: HCPCS | Performed by: INTERNAL MEDICINE

## 2020-08-14 RX ORDER — LORAZEPAM 2 MG/ML
2 INJECTION INTRAMUSCULAR
Status: DISCONTINUED | OUTPATIENT
Start: 2020-08-14 | End: 2020-08-20 | Stop reason: HOSPADM

## 2020-08-14 RX ORDER — SODIUM CHLORIDE 9 MG/ML
INJECTION, SOLUTION INTRAVENOUS CONTINUOUS
Status: DISCONTINUED | OUTPATIENT
Start: 2020-08-14 | End: 2020-08-15

## 2020-08-14 RX ORDER — MAGNESIUM SULFATE IN WATER 40 MG/ML
4 INJECTION, SOLUTION INTRAVENOUS ONCE
Status: COMPLETED | OUTPATIENT
Start: 2020-08-14 | End: 2020-08-14

## 2020-08-14 RX ORDER — LORAZEPAM 2 MG/ML
3 INJECTION INTRAMUSCULAR
Status: DISCONTINUED | OUTPATIENT
Start: 2020-08-14 | End: 2020-08-20 | Stop reason: HOSPADM

## 2020-08-14 RX ORDER — ACETAMINOPHEN 325 MG/1
650 TABLET ORAL EVERY 6 HOURS PRN
Status: DISCONTINUED | OUTPATIENT
Start: 2020-08-14 | End: 2020-08-20 | Stop reason: HOSPADM

## 2020-08-14 RX ORDER — AMLODIPINE BESYLATE 5 MG/1
10 TABLET ORAL DAILY
Status: DISCONTINUED | OUTPATIENT
Start: 2020-08-14 | End: 2020-08-20 | Stop reason: HOSPADM

## 2020-08-14 RX ORDER — LORAZEPAM 1 MG/1
3 TABLET ORAL
Status: DISCONTINUED | OUTPATIENT
Start: 2020-08-14 | End: 2020-08-20 | Stop reason: HOSPADM

## 2020-08-14 RX ORDER — ONDANSETRON 2 MG/ML
4 INJECTION INTRAMUSCULAR; INTRAVENOUS EVERY 6 HOURS PRN
Status: DISCONTINUED | OUTPATIENT
Start: 2020-08-14 | End: 2020-08-20 | Stop reason: HOSPADM

## 2020-08-14 RX ORDER — LORAZEPAM 1 MG/1
2 TABLET ORAL
Status: DISCONTINUED | OUTPATIENT
Start: 2020-08-14 | End: 2020-08-20 | Stop reason: HOSPADM

## 2020-08-14 RX ORDER — ONDANSETRON 2 MG/ML
4 INJECTION INTRAMUSCULAR; INTRAVENOUS ONCE
Status: DISCONTINUED | OUTPATIENT
Start: 2020-08-14 | End: 2020-08-14

## 2020-08-14 RX ORDER — CHLORDIAZEPOXIDE HYDROCHLORIDE 10 MG/1
10 CAPSULE, GELATIN COATED ORAL 2 TIMES DAILY
Status: DISCONTINUED | OUTPATIENT
Start: 2020-08-14 | End: 2020-08-18

## 2020-08-14 RX ORDER — KETOROLAC TROMETHAMINE 30 MG/ML
15 INJECTION, SOLUTION INTRAMUSCULAR; INTRAVENOUS EVERY 6 HOURS PRN
Status: DISPENSED | OUTPATIENT
Start: 2020-08-14 | End: 2020-08-15

## 2020-08-14 RX ORDER — LORAZEPAM 2 MG/ML
4 INJECTION INTRAMUSCULAR
Status: DISCONTINUED | OUTPATIENT
Start: 2020-08-14 | End: 2020-08-20 | Stop reason: HOSPADM

## 2020-08-14 RX ORDER — LORAZEPAM 2 MG/ML
1 INJECTION INTRAMUSCULAR
Status: DISCONTINUED | OUTPATIENT
Start: 2020-08-14 | End: 2020-08-20 | Stop reason: HOSPADM

## 2020-08-14 RX ORDER — PANTOPRAZOLE SODIUM 40 MG/10ML
40 INJECTION, POWDER, LYOPHILIZED, FOR SOLUTION INTRAVENOUS DAILY
Status: DISCONTINUED | OUTPATIENT
Start: 2020-08-14 | End: 2020-08-16

## 2020-08-14 RX ORDER — POLYETHYLENE GLYCOL 3350 17 G/17G
17 POWDER, FOR SOLUTION ORAL DAILY PRN
Status: DISCONTINUED | OUTPATIENT
Start: 2020-08-14 | End: 2020-08-20 | Stop reason: HOSPADM

## 2020-08-14 RX ORDER — LORAZEPAM 1 MG/1
4 TABLET ORAL
Status: DISCONTINUED | OUTPATIENT
Start: 2020-08-14 | End: 2020-08-20 | Stop reason: HOSPADM

## 2020-08-14 RX ORDER — THIAMINE MONONITRATE (VIT B1) 100 MG
100 TABLET ORAL DAILY
Status: DISCONTINUED | OUTPATIENT
Start: 2020-08-14 | End: 2020-08-20 | Stop reason: HOSPADM

## 2020-08-14 RX ORDER — LORAZEPAM 2 MG/ML
1 INJECTION INTRAMUSCULAR EVERY 6 HOURS PRN
Status: DISCONTINUED | OUTPATIENT
Start: 2020-08-14 | End: 2020-08-14

## 2020-08-14 RX ORDER — SODIUM CHLORIDE 0.9 % (FLUSH) 0.9 %
10 SYRINGE (ML) INJECTION PRN
Status: DISCONTINUED | OUTPATIENT
Start: 2020-08-14 | End: 2020-08-20 | Stop reason: HOSPADM

## 2020-08-14 RX ORDER — SODIUM CHLORIDE 0.9 % (FLUSH) 0.9 %
10 SYRINGE (ML) INJECTION EVERY 12 HOURS SCHEDULED
Status: DISCONTINUED | OUTPATIENT
Start: 2020-08-14 | End: 2020-08-20 | Stop reason: HOSPADM

## 2020-08-14 RX ORDER — FOLIC ACID 1 MG/1
1 TABLET ORAL DAILY
Status: DISCONTINUED | OUTPATIENT
Start: 2020-08-14 | End: 2020-08-14

## 2020-08-14 RX ORDER — ACETAMINOPHEN 650 MG/1
650 SUPPOSITORY RECTAL EVERY 6 HOURS PRN
Status: DISCONTINUED | OUTPATIENT
Start: 2020-08-14 | End: 2020-08-20 | Stop reason: HOSPADM

## 2020-08-14 RX ORDER — MULTIVITAMIN WITH IRON
1 TABLET ORAL DAILY
Status: DISCONTINUED | OUTPATIENT
Start: 2020-08-14 | End: 2020-08-14

## 2020-08-14 RX ORDER — CHLORDIAZEPOXIDE HYDROCHLORIDE 10 MG/1
10 CAPSULE, GELATIN COATED ORAL EVERY EVENING
Status: DISCONTINUED | OUTPATIENT
Start: 2020-08-14 | End: 2020-08-14

## 2020-08-14 RX ORDER — PROMETHAZINE HYDROCHLORIDE 25 MG/1
12.5 TABLET ORAL EVERY 6 HOURS PRN
Status: DISCONTINUED | OUTPATIENT
Start: 2020-08-14 | End: 2020-08-20 | Stop reason: HOSPADM

## 2020-08-14 RX ORDER — LORAZEPAM 1 MG/1
1 TABLET ORAL
Status: DISCONTINUED | OUTPATIENT
Start: 2020-08-14 | End: 2020-08-20 | Stop reason: HOSPADM

## 2020-08-14 RX ADMIN — CHLORDIAZEPOXIDE HYDROCHLORIDE 10 MG: 10 CAPSULE ORAL at 20:55

## 2020-08-14 RX ADMIN — AMLODIPINE BESYLATE 10 MG: 5 TABLET ORAL at 11:02

## 2020-08-14 RX ADMIN — LORAZEPAM 2 MG: 2 INJECTION INTRAMUSCULAR; INTRAVENOUS at 10:11

## 2020-08-14 RX ADMIN — SODIUM CHLORIDE: 9 INJECTION, SOLUTION INTRAVENOUS at 15:48

## 2020-08-14 RX ADMIN — CHLORDIAZEPOXIDE HYDROCHLORIDE 10 MG: 10 CAPSULE ORAL at 11:02

## 2020-08-14 RX ADMIN — Medication 10 ML: at 10:11

## 2020-08-14 RX ADMIN — Medication 10 ML: at 20:55

## 2020-08-14 RX ADMIN — MULTIVITAMIN TABLET 1 TABLET: TABLET at 11:02

## 2020-08-14 RX ADMIN — Medication 100 MG: at 11:05

## 2020-08-14 RX ADMIN — FOLIC ACID 1 MG: 1 TABLET ORAL at 11:02

## 2020-08-14 RX ADMIN — FOLIC ACID: 5 INJECTION, SOLUTION INTRAMUSCULAR; INTRAVENOUS; SUBCUTANEOUS at 20:54

## 2020-08-14 RX ADMIN — PANTOPRAZOLE SODIUM 40 MG: 40 INJECTION, POWDER, FOR SOLUTION INTRAVENOUS at 17:03

## 2020-08-14 RX ADMIN — MAGNESIUM SULFATE HEPTAHYDRATE 4 G: 40 INJECTION, SOLUTION INTRAVENOUS at 15:46

## 2020-08-14 ASSESSMENT — PAIN SCALES - WONG BAKER: WONGBAKER_NUMERICALRESPONSE: 0

## 2020-08-14 ASSESSMENT — PAIN SCALES - GENERAL
PAINLEVEL_OUTOF10: 0

## 2020-08-14 NOTE — PROGRESS NOTES
Physical Therapy    Facility/Department: [address]  Initial Assessment    NAME: Gerry Mora  : 1975  MRN: [id number]    Date of Service: 2020    Discharge Recommendations:  Gerry Mora scored a 16/24 on the AM-PAC short mobility form. Current research shows that an AM-PAC score of 17 or less is typically not associated with a discharge to the patient's home setting. Based on the patient's AM-PAC score and their current functional mobility deficits, it is recommended that the patient have 3-5 sessions per week of Physical Therapy at d/c to increase the patient's independence. Please see assessment section for further patient specific details. If patient discharges prior to next session this note will serve as a discharge summary. Please see below for the latest assessment towards goals. 3-5 sessions per week, 24 hour supervision or assist   PT Equipment Recommendations  Equipment Needed: Yes  Mobility Devices: Pinky Gloria: Rolling    Assessment   Body structures, Functions, Activity limitations: Decreased functional mobility ; Decreased balance;Decreased strength;Decreased safe awareness;Decreased cognition;Decreased endurance;Decreased coordination  Assessment: Patient exhibits ataxia/balance deficits secondary to ETOH abuse. Recommend 24 hour assist, continued therapy to address balance issues. Treatment Diagnosis: ataxia/balance deficits  Prognosis: Fair  Decision Making: Medium Complexity  History: As above. Exam: functional mobility assessment  Clinical Presentation: Stable. PT Education: Goals;PT Role;Plan of Care;Transfer Training;Gait Training;Functional Mobility Training  Patient Education: Patient verbalized understanding, will need reinforcement.   Barriers to Learning: language, questionable cognition  REQUIRES PT FOLLOW UP: Yes  Activity Tolerance  Activity Tolerance: Patient limited by fatigue       Patient Diagnosis(es): The primary encounter diagnosis was Acute alcoholic intoxication with complication ([facility] [address] A diagnosis of Generalized abdominal pain was also pertinent to this visit. has a past medical history of Alcohol abuse with physiological dependence ([address] Essential hypertension, and HTN (hypertension). has a past surgical history that includes Upper gastrointestinal endoscopy (N/A, 6/12/2020). Restrictions  Restrictions/Precautions  Restrictions/Precautions: Fall Risk(high fall risk)  Required Braces or Orthoses?: No  Position Activity Restriction  Other position/activity restrictions: Zahira Rogers is a 41 y.o. male with past medical history of hypertension and documented alcohol abuse who presents to the ED with complaint of abdominal pain. Patient states he is having some lower abdominal pain with associated nausea and vomiting. States associated headache as well. States started this afternoon. Denies any dysuria, frequency, urgency, hematuria, testicular pain/swelling or changes in bowel movements. Denies fever chills. Denies sick contacts or recent travel. Denies chest pain or shortness of breath. Denies rashes or lesions. Denies any surgeries to the abdomen the past.  Became concerned and came to the ED for further evaluation and treatment. States sharp pain rated 5/10.      Vision/Hearing  Vision: Within Functional Limits  Hearing: Within functional limits       Subjective  General  Chart Reviewed: Yes  Patient assessed for rehabilitation services?: Yes  Additional Pertinent Hx: ETOH abuse, HTN  Response To Previous Treatment: Not applicable  Family / Caregiver Present: No  Diagnosis: acute alcoholic intoxication w/ complication  Pain Screening  Patient Currently in Pain: Denies     Orientation  Orientation  Overall Orientation Status: Within Functional Limits     Social/Functional History  Social/Functional History  Lives With: Friend(s)  Type of Home: Apartment  Ambulation Assistance: Independent  Transfer Assistance: Goals  Short term goals  Time Frame for Short term goals: Discharge. Short term goal 1: Patient will perform bed-chair transfer MOD I w/ LRAD. Short term goal 2: Patient will ambulate 48' MOD I w/ LRAD. Patient Goals   Patient goals : None verbalized.      Therapy Time   Individual Concurrent Group Co-treatment   Time In 6791     2266   Time Out 1021     1005   Minutes 14     24   Timed Code Treatment Minutes: [address] , [place], DPT, ATC-R [id number]

## 2020-08-14 NOTE — PROGRESS NOTES
VS remains stable, BP slightly elevated. Pt denied pain this shift. No n/v. Score 13 CIWA this AM given 2mg ativan, rescore of 7.

## 2020-08-14 NOTE — PROGRESS NOTES
Occupational Therapy   Occupational Therapy Initial Assessment  Date: 2020   Patient Name: Alise Reid  MRN: [id number]     : 1975    Date of Service: 2020    Discharge Recommendations: Alise Reid scored a 21/24 on the AM-PAC ADL Inpatient form. Current research shows that an AM-PAC score of 18 or greater is typically associated with a discharge to the patient's home setting. Based on the patient's AM-PAC score, and their current ADL deficits, it is recommended that the patient have 2-3 sessions  of Occupational Therapy during inpatient stay at d/c to increase the patient's independence. At this time, pt will most likely not require home health OT upon discharge. If patient discharges prior to next session this note will serve as a discharge summary. Please see below for the latest assessment towards goals. OT Equipment Recommendations  Equipment Needed: No    Assessment   Performance deficits / Impairments: Decreased functional mobility ; Decreased endurance;Decreased ADL status; Decreased balance;Decreased fine motor control;Decreased high-level IADLs;Decreased coordination  Assessment: 41 y.o. male presents w/ the above deficits which are impacting his occupational performance. Pt would benefit from continued therapy during inpatient stay in order to maximize safety and independence upon discharge.  Most likely pt will not require OT services after discharge from acute care  Treatment Diagnosis: Multiple performance deficits d/t gastritis  Prognosis: Good  Decision Making: Low Complexity  OT Education: OT Role;Plan of Care;Transfer Training  Patient Education: Education provided will need reinforcement for carryover  Barriers to Learning: Language, psychosocial  REQUIRES OT FOLLOW UP: Yes  Activity Tolerance  Activity Tolerance: Patient limited by fatigue  Activity Tolerance: Pt declining all additional activity at this time stating \"I want to sleep\"  Safety Devices  Safety Devices in place: Yes  Type of devices: Left in bed;Bed alarm in place;Nurse notified;Call light within reach; Patient at risk for falls; All fall risk precautions in place(seizure mats in place on bedrails)           Patient Diagnosis(es): The primary encounter diagnosis was Acute alcoholic intoxication with complication ([facility] [address] A diagnosis of Generalized abdominal pain was also pertinent to this visit. has a past medical history of Alcohol abuse with physiological dependence ([address] Essential hypertension, and HTN (hypertension). has a past surgical history that includes Upper gastrointestinal endoscopy (N/A, 6/12/2020). Treatment Diagnosis: Multiple performance deficits d/t gastritis      Restrictions  Restrictions/Precautions  Restrictions/Precautions: Fall Risk(high fall risk)  Required Braces or Orthoses?: No  Position Activity Restriction  Other position/activity restrictions: Ashvin Lopez is a 41 y.o. male with past medical history of hypertension and documented alcohol abuse who presents to the ED with complaint of abdominal pain. Patient states he is having some lower abdominal pain with associated nausea and vomiting. States associated headache as well. States started this afternoon. Denies any dysuria, frequency, urgency, hematuria, testicular pain/swelling or changes in bowel movements. Denies fever chills. Denies sick contacts or recent travel. Denies chest pain or shortness of breath. Denies rashes or lesions. Denies any surgeries to the abdomen the past.  Became concerned and came to the ED for further evaluation and treatment. States sharp pain rated 5/10. Subjective   General  Chart Reviewed: Yes  Patient assessed for rehabilitation services?: Yes  Family / Caregiver Present: No  Diagnosis: Acute alcoholic gastritis  Subjective  Subjective: Pt supine in bed upon entry-BUE tremors.  Pt agreeable to OT/PT eval  General Comment  Comments: Pt speaks Pietro Yoder but benefits from staff members stance)  Additional Comments: Pt seated EOB to don socks. Ambulated w/ rw to bathroom, voided urine in stance. Pt washed hands in stance at sink. Pt seated in chair, drank from a cup w/ Max A. Pt declining further activity, declining sitting upright in chair stating \"I want to sleep\"  Tone RUE  RUE Tone: Normotonic  Tone LUE  LUE Tone: Normotonic  Coordination  Movements Are Fluid And Coordinated: No  Coordination and Movement description: Fine motor impairments;Gross motor impairments; Left UE;Right UE;Tremors     Bed mobility  Supine to Sit: Modified independent  Sit to Supine: Modified independent  Transfers  Sit to stand: Contact guard assistance  Stand to sit: Contact guard assistance     Cognition  Overall Cognitive Status: WFL     LUE AROM (degrees)  LUE AROM : WFL  RUE AROM (degrees)  RUE AROM : WFL  LUE Strength  Gross LUE Strength: WFL  RUE Strength  Gross RUE Strength: WFL         Plan   Plan  Times per week: 1-2  Times per day: Daily  Current Treatment Recommendations: Strengthening, Balance Training, Endurance Training, Safety Education & Training, Self-Care / ADL, Equipment Evaluation, Education, & procurement, Home Management Training, Patient/Caregiver Education & Training      AM-PAC Score        AM-[facility] Inpatient Daily Activity Raw Score: 21 (08/14/20 1140)  AM-PAC Inpatient ADL T-Scale Score : 44.27 (08/14/20 1140)  ADL Inpatient CMS 0-100% Score: 32.79 (08/14/20 1140)  ADL Inpatient CMS G-Code Modifier : Lis Goss (08/14/20 1140)    Goals  Short term goals  Time Frame for Short term goals: Discharge  Short term goal 1: Functional transfer for ADL completion w/ independence  Short term goal 2: LB dressing w/ independence  Short term goal 3: UB dressing w/ independence  Short term goal 4: Toileting w/ independence  Short term goal 5: Bathing w/ independence  Long term goals  Time Frame for Long term goals : LTG=STG  Patient Goals   Patient goals : Pt did not state a goal at this time       Therapy Time Individual Concurrent Group Co-treatment   Time In       [address] [place] [address]   Time Out       0955   Minutes       14        Timed Code Treatment Minutes:   0  Total Treatment Minutes:  Adalberto [address], OT   Lanney Duverney McCoy, [place] #[id number]

## 2020-08-14 NOTE — ED NOTES
Report given to [facility]. V/u and all questions answered.       Анна Springer RN  08/14/20 1993

## 2020-08-14 NOTE — H&P
Discharge Planning Assessment    SW discharge planner met with patient to discuss reason for admission, current living situation, and potential needs at the time of discharge    Demographics/Insurance verified Yes/No:No Pending Medicaid    Current type of dwelling: Apartment    Patient from ECF/SW confirmed with: NA    Living arrangements: With a friend. Level of function/Support: Patient reports that he is independent with ADLs and IADLs. PCP: None    Last Visit to PCP:    DME: None    Active with any community resources/agencies/skilled home care:None    Medication compliance issues: None    Financial issues that could impact healthcare: Pending Medicaid. Tentative discharge plan:  PT/OT evaluations recommending that patient discharge with PT services and 24 hour supervision. SW consult also noted for alcohol rehab. SW provided patient with a list of alcohol rehab programs. Discussed and provided facilities of choice if transition to a skilled nursing facility is required at the time of discharge      Discussed with patient and/or family that on the day of discharge home tentative time of discharge will be between 10 AM and noon. Transportation at the time of discharge: Patient stating that he has his car at hospital and will transport himself home.     Electronically signed by BIJAN Higginbotham on 8/14/2020 at 1:02 PM

## 2020-08-14 NOTE — H&P
Hospital Medicine History & Physical      PCP: No primary care provider on file. Date of Admission: 8/13/2020    Date of Service: Pt seen/examined on 08/14/20 and Admitted to Inpatient with expected LOS greater than two midnights due to medical therapy. Chief Complaint:  Abdominal pain       History Of Present Illness:  Berneta Sacks is 41 y.o. male who presented with complaint of abdominal pain. Symptom onset was acute for a time period of 1 day. The severity is described as moderate. The course of his symptoms over time is worsening. The symptoms improved with none and worsened with none. The patient's symptom is associated with nausea, vomiting and headache. Berneta Sacks is 41 y.o. male with history of alcohol abuse and HTN  This is the 5 th admission to this hospital related to alcohol abuse. He presents to the ER with complaint of abdominal pain, for 1 day, sharp and rated at 5/10. Pain is generalized and associated with nausea, vomiting and headache. CT abd/pelvis unremarkable. Past Medical History:          Diagnosis Date    Alcohol abuse with physiological dependence ([address]     Essential hypertension 8/14/2020    HTN (hypertension)        Past Surgical History:          Procedure Laterality Date    UPPER GASTROINTESTINAL ENDOSCOPY N/A 6/12/2020    EGD BIOPSY performed by Ceci Kirk MD at [address]       Medications Prior to Admission:      Prior to Admission medications    Medication Sig Start Date End Date Taking?  Authorizing Provider   folic acid (FOLVITE) 1 MG tablet Take 1 tablet by mouth daily 6/12/20  Yes ADAM Traore CNP   pantoprazole (PROTONIX) 40 MG tablet Take 1 tablet by mouth every morning (before breakfast) 6/13/20  Yes ADAM Traore CNP   vitamin B-1 100 MG tablet Take 1 tablet by mouth daily 6/13/20  Yes ADAM Traore CNP   Multiple Vitamin (MULTIVITAMIN) TABS tablet Take 1 tablet by mouth focal sensory/motor deficits. Cranial nerves: II-XII intact, grossly non-focal.  Psychiatric:  Alert and oriented, thought content appropriate, normal insight  Capillary Refill: Brisk,< 3 seconds   Peripheral Pulses: +2 palpable, equal bilaterally       Labs:     Recent Labs     08/13/20  1432   WBC 2.3*   HGB 14.9   HCT 42.2   PLT 90*     Recent Labs     08/13/20  1432      K 3.7   CL 94*   CO2 26   BUN 11   CREATININE 0.8*   CALCIUM 9.2     Recent Labs     08/13/20  1432   AST 95*   ALT 62*   BILITOT 0.5   ALKPHOS 104     No results for input(s): INR in the last 72 hours. No results for input(s): Sivan Grumet in the last 72 hours. Urinalysis:      Lab Results   Component Value Date    NITRU Negative 08/13/2020    WBCUA 1 08/13/2020    BACTERIA 1+ 07/20/2020    RBCUA 1 08/13/2020    BLOODU Negative 08/13/2020    SPECGRAV >1.030 08/13/2020    GLUCOSEU Negative 08/13/2020       Radiology:     CT : I have reviewed the CT abd/pelvis with the following interpretation: no acute finding       CT ABDOMEN PELVIS W IV CONTRAST Additional Contrast? None   Final Result   No acute inflammatory abnormality is identified. No bowel obstruction. Normal appendix. Hepatic steatosis. ASSESSMENT:    Principal Problem:    Acute alcoholic gastritis without hemorrhage  Active Problems:    Seizure-like activity (HCC)    Alcohol dependence with withdrawal (HCC)    Essential hypertension    Elevated LFTs    Frequent hospital admissions  Resolved Problems:    * No resolved hospital problems. *        PLAN:    1. Acute alcohol gastritis - suspect abd likely related to alcohol abuse. EGD 6/12/20 normal. He was told to take Protonix daily for few months. Start IV Protonix. 2. Alcohol abuse - no withdrawal at this time. Per nurse, he was somewhat anxious so I have started him on librium 10 mg bid and ativan 1 mg prn. IV and PO vitamins ordered. Monitor and correct electrolytes.      3. HTN, uncontrolled - start home BP med amlodipine     4. Elevated LFT - likely due to alcohol abuse. Advise to quit. Hepatitis studies 6/11/20 unremarkable    5. History of seizure-like activity - seizure, fall and withdrawal precautions. DVT Prophylaxis: Lovenox   Diet: DIET GENERAL;  Code Status: Full Code    PT/OT Eval Status: ordered     Dispo - Admit to Jan Cuevas MD    Thank you No primary care provider on file. for the opportunity to be involved in this patient's care. If you have any questions or concerns please feel free to contact me at [phone].

## 2020-08-14 NOTE — PROGRESS NOTES
[facility]    8/14/2020 12:24 PM        Name: Benjie Delarosa . Admitted: 8/13/2020  Primary Care Provider: No primary care provider on file. (Tel: None)      Subjective:  Patient is a 40 yo male with hx HTN, ETOH abuse. He presented with c/o abdominal pain associated with n/v and HA. ETOH level 389 on presentation. CT abdomen/pelvis unremarkable. He has had recurrent hospital admissions related to alcohol. Admitted early this am by associate. Presently resting in bed, seizure precautions in place. Says he is feeling a little better, still with abdominal pain but not as severe. Denies nausea, tolerated lunch. Note tremors in hands, received Ativan earlier for CIWA score 13.      Reviewed interval ancillary notes    Current Medications  sodium chloride flush 0.9 % injection 10 mL, 2 times per day  sodium chloride flush 0.9 % injection 10 mL, PRN  acetaminophen (TYLENOL) tablet 650 mg, Q6H PRN    Or  acetaminophen (TYLENOL) suppository 650 mg, Q6H PRN  polyethylene glycol (GLYCOLAX) packet 17 g, Daily PRN  promethazine (PHENERGAN) tablet 12.5 mg, Q6H PRN    Or  ondansetron (ZOFRAN) injection 4 mg, Q6H PRN  enoxaparin (LOVENOX) injection 40 mg, Daily  ketorolac (TORADOL) injection 15 mg, Q6H PRN  pantoprazole (PROTONIX) injection 40 mg, Daily  chlordiazePOXIDE (LIBRIUM) capsule 10 mg, BID  sodium chloride 0.9 % 587 mL with folic acid 1 mg, adult multi-vitamin with vitamin k 10 mL, thiamine 300 mg, Daily  amLODIPine (NORVASC) tablet 10 mg, Daily  folic acid (FOLVITE) tablet 1 mg, Daily  multivitamin 1 tablet, Daily  vitamin B-1 (THIAMINE) tablet 100 mg, Daily  LORazepam (ATIVAN) tablet 1 mg, Q1H PRN    Or  LORazepam (ATIVAN) injection 1 mg, Q1H PRN    Or  LORazepam (ATIVAN) tablet 2 mg, Q1H PRN    Or  LORazepam (ATIVAN) injection 2 mg, Q1H PRN    Or  LORazepam (ATIVAN) tablet 3 mg, Q1H PRN    Or  LORazepam (ATIVAN) injection 3 mg, Q1H PRN    Or  LORazepam (ATIVAN) tablet 4 mg, Q1H PRN    Or  LORazepam (ATIVAN) injection 4 mg, Q1H PRN  sodium chloride flush 0.9 % injection 10 mL, 2 times per day        Objective:  BP (!) 157/95   Pulse 78   Temp 98.3 °F (36.8 °C) (Oral)   Resp 17   Ht 5' 7\" (1.702 m)   Wt 138 lb 11.2 oz (62.9 kg)   SpO2 99%   BMI 21.72 kg/m²   No intake or output data in the 24 hours ending 08/14/20 1224   Wt Readings from Last 3 Encounters:   08/14/20 138 lb 11.2 oz (62.9 kg)   07/26/20 140 lb 6.9 oz (63.7 kg)   06/12/20 146 lb 3.2 oz (66.3 kg)       General appearance:  Appears comfortable  Eyes: Sclera clear. Pupils equal.  ENT: Moist oral mucosa. Trachea midline, no adenopathy. Cardiovascular: Regular rhythm, normal S1, S2. No murmur. No edema in lower extremities  Respiratory: Not using accessory muscles. Good inspiratory effort. Clear to auscultation bilaterally, no wheeze or crackles. GI: Abdomen soft, no tenderness, not distended, normal bowel sounds  Musculoskeletal: No cyanosis in digits, neck supple  Neurology: CN 2-12 grossly intact. No speech  Deficits, note tremors of hands  Psych: Normal affect. Alert and oriented in time, place and person  Skin: Warm, dry, normal turgor    Labs and Tests:  CBC:   Recent Labs     08/13/20  1432   WBC 2.3*   HGB 14.9   PLT 90*     BMP:    Recent Labs     08/13/20  1432      K 3.7   CL 94*   CO2 26   BUN 11   CREATININE 0.8*   GLUCOSE 108*     Hepatic:   Recent Labs     08/13/20  1432   AST 95*   ALT 62*   BILITOT 0.5   ALKPHOS 104       CT Abdomen/pelvis 8/13/2020:  FINDINGS:    Lower Chest: The lung bases are clear.  The visualized heart is unremarkable.         Organs: There is moderate fatty infiltration of the liver.  No gallbladder    stones are seen.  The pancreas, spleen and adrenal glands are unremarkable.         The kidneys enhance symmetrically.  There is no hydronephrosis. Keagan Hearing is no    suspicious renal abnormality.      GI/Bowel: The stomach and small bowel are unremarkable.  The appendix is    normal.  There is no focal mural thickening of the colon appreciated. No    pericolonic edema is visualized.         The mesenteric vasculature enhances in normal fashion.         Pelvis: The urinary bladder and prostate gland are unremarkable.  No distal    obstructing ureteral stone is seen. No pelvic mass is identified.         Peritoneum/Retroperitoneum: There is no adenopathy or aneurysm.         Bones/Soft Tissues: No acute osseous abnormality is identified. Soft tissues    of the abdominal wall are unremarkable.              Impression    No acute inflammatory abnormality is identified.         No bowel obstruction.         Normal appendix.         Hepatic steatosis. Problem List  Principal Problem:    Acute alcoholic gastritis without hemorrhage  Active Problems:    Seizure-like activity (HCC)    Alcohol dependence with withdrawal (HCC)    Essential hypertension    Elevated LFTs    Frequent hospital admissions  Resolved Problems:    * No resolved hospital problems. *       Assessment & Plan:   1. Acute gastritis. Secondary to ETOH abuse. Started on IV Protonix. Continue IV Protonix daily. 2. Alcohol abuse/withdrawal. Note tremors, CIWA score 13. Placed on CIWA protocol. Continue librium 10 mg BID, and bannana bag (multivitamin, thiamine and folic acid). 3. Elevated LFT. Likely secondary to ETOH abuse. No acute findings on CT scan. CMP in am.   4. Hypomagnesemia. Magnesium level 1.4. Replace. Recheck in am. Potassium 3.7   5. HTN. Elevated, possibly secondary to discomfort. Continue amlodipine 10 mg. Continue to follow. 6. Hx seizure. Continue seizure and fall precautions, CIWA protocol.        Diet: DIET GENERAL;  Code:Full Code  DVT PPX: on enoxaparin (refused), add ADAM Hathaway - CNP   8/14/2020 12:24 PM

## 2020-08-15 LAB
A/G RATIO: 1.1 (ref 1.1–2.2)
ALBUMIN SERPL-MCNC: 3.9 G/DL (ref 3.4–5)
ALP BLD-CCNC: 91 U/L (ref 40–129)
ALT SERPL-CCNC: 79 U/L (ref 10–40)
ANION GAP SERPL CALCULATED.3IONS-SCNC: 11 MMOL/L (ref 3–16)
AST SERPL-CCNC: 171 U/L (ref 15–37)
BILIRUB SERPL-MCNC: 1 MG/DL (ref 0–1)
BUN BLDV-MCNC: 5 MG/DL (ref 7–20)
CALCIUM SERPL-MCNC: 8.8 MG/DL (ref 8.3–10.6)
CHLORIDE BLD-SCNC: 98 MMOL/L (ref 99–110)
CO2: 26 MMOL/L (ref 21–32)
CREAT SERPL-MCNC: 0.7 MG/DL (ref 0.9–1.3)
GFR AFRICAN AMERICAN: >60
GFR NON-AFRICAN AMERICAN: >60
GLOBULIN: 3.6 G/DL
GLUCOSE BLD-MCNC: 96 MG/DL (ref 70–99)
HCT VFR BLD CALC: 38.6 % (ref 40.5–52.5)
HEMOGLOBIN: 13.4 G/DL (ref 13.5–17.5)
LIPASE: 52 U/L (ref 13–60)
MAGNESIUM: 2.3 MG/DL (ref 1.8–2.4)
MCH RBC QN AUTO: 33.7 PG (ref 26–34)
MCHC RBC AUTO-ENTMCNC: 34.6 G/DL (ref 31–36)
MCV RBC AUTO: 97.4 FL (ref 80–100)
PDW BLD-RTO: 12.5 % (ref 12.4–15.4)
PLATELET # BLD: 53 K/UL (ref 135–450)
PMV BLD AUTO: 7.7 FL (ref 5–10.5)
POTASSIUM SERPL-SCNC: 3.7 MMOL/L (ref 3.5–5.1)
RBC # BLD: 3.96 M/UL (ref 4.2–5.9)
SODIUM BLD-SCNC: 135 MMOL/L (ref 136–145)
TOTAL PROTEIN: 7.5 G/DL (ref 6.4–8.2)
WBC # BLD: 2.2 K/UL (ref 4–11)

## 2020-08-15 PROCEDURE — 2580000003 HC RX 258: Performed by: NURSE PRACTITIONER

## 2020-08-15 PROCEDURE — 80053 COMPREHEN METABOLIC PANEL: CPT

## 2020-08-15 PROCEDURE — 36415 COLL VENOUS BLD VENIPUNCTURE: CPT

## 2020-08-15 PROCEDURE — 94760 N-INVAS EAR/PLS OXIMETRY 1: CPT

## 2020-08-15 PROCEDURE — 6370000000 HC RX 637 (ALT 250 FOR IP): Performed by: NURSE PRACTITIONER

## 2020-08-15 PROCEDURE — 83735 ASSAY OF MAGNESIUM: CPT

## 2020-08-15 PROCEDURE — 2060000000 HC ICU INTERMEDIATE R&B

## 2020-08-15 PROCEDURE — 83690 ASSAY OF LIPASE: CPT

## 2020-08-15 PROCEDURE — C9113 INJ PANTOPRAZOLE SODIUM, VIA: HCPCS | Performed by: INTERNAL MEDICINE

## 2020-08-15 PROCEDURE — 6360000002 HC RX W HCPCS: Performed by: INTERNAL MEDICINE

## 2020-08-15 PROCEDURE — 85027 COMPLETE CBC AUTOMATED: CPT

## 2020-08-15 PROCEDURE — 2580000003 HC RX 258: Performed by: INTERNAL MEDICINE

## 2020-08-15 PROCEDURE — 6370000000 HC RX 637 (ALT 250 FOR IP): Performed by: INTERNAL MEDICINE

## 2020-08-15 RX ORDER — MULTIVITAMIN WITH IRON
1 TABLET ORAL DAILY
Status: DISCONTINUED | OUTPATIENT
Start: 2020-08-15 | End: 2020-08-20 | Stop reason: HOSPADM

## 2020-08-15 RX ORDER — FOLIC ACID 1 MG/1
1 TABLET ORAL DAILY
Status: DISCONTINUED | OUTPATIENT
Start: 2020-08-15 | End: 2020-08-20 | Stop reason: HOSPADM

## 2020-08-15 RX ADMIN — LORAZEPAM 1 MG: 1 TABLET ORAL at 08:50

## 2020-08-15 RX ADMIN — KETOROLAC TROMETHAMINE 15 MG: 30 INJECTION, SOLUTION INTRAMUSCULAR at 23:39

## 2020-08-15 RX ADMIN — LORAZEPAM 2 MG: 1 TABLET ORAL at 23:39

## 2020-08-15 RX ADMIN — CHLORDIAZEPOXIDE HYDROCHLORIDE 10 MG: 10 CAPSULE ORAL at 08:49

## 2020-08-15 RX ADMIN — CHLORDIAZEPOXIDE HYDROCHLORIDE 10 MG: 10 CAPSULE ORAL at 20:51

## 2020-08-15 RX ADMIN — PANTOPRAZOLE SODIUM 40 MG: 40 INJECTION, POWDER, FOR SOLUTION INTRAVENOUS at 08:49

## 2020-08-15 RX ADMIN — Medication 10 ML: at 08:50

## 2020-08-15 RX ADMIN — FOLIC ACID 1 MG: 1 TABLET ORAL at 15:56

## 2020-08-15 RX ADMIN — LORAZEPAM 1 MG: 1 TABLET ORAL at 20:51

## 2020-08-15 RX ADMIN — Medication 100 MG: at 08:49

## 2020-08-15 RX ADMIN — Medication 10 ML: at 22:18

## 2020-08-15 RX ADMIN — MULTIVITAMIN TABLET 1 TABLET: TABLET at 15:56

## 2020-08-15 RX ADMIN — AMLODIPINE BESYLATE 10 MG: 5 TABLET ORAL at 08:49

## 2020-08-15 ASSESSMENT — PAIN SCALES - GENERAL
PAINLEVEL_OUTOF10: 0
PAINLEVEL_OUTOF10: 6
PAINLEVEL_OUTOF10: 0
PAINLEVEL_OUTOF10: 0

## 2020-08-15 NOTE — PROGRESS NOTES
times per day  0.9 % sodium chloride infusion, Continuous        Objective:  BP (!) 135/92   Pulse 87   Temp 98.2 °F (36.8 °C) (Oral)   Resp 17   Ht 5' 7\" (1.702 m)   Wt 139 lb 9.6 oz (63.3 kg)   SpO2 98%   BMI 21.86 kg/m²     Intake/Output Summary (Last 24 hours) at 8/15/2020 0905  Last data filed at 8/15/2020 0849  Gross per 24 hour   Intake 10 ml   Output 500 ml   Net -490 ml      Wt Readings from Last 3 Encounters:   08/15/20 139 lb 9.6 oz (63.3 kg)   07/26/20 140 lb 6.9 oz (63.7 kg)   06/12/20 146 lb 3.2 oz (66.3 kg)     General:  Awake, alert, oriented in NAD  Skin:  Warm and dry. No unusual bruising or rash  Neck:  Supple. No JVD appreciated  Chest:  Normal effort. Clear to auscultation, no wheezes/rhonchi/rales  Cardiovascular:  RRR, normal S1/S2, no murmur/gallop/rub  Abdomen:  Soft, mild generalized tenderness on exam, +bowel sounds  Extremities:  No edema  Neurological: No focal deficits  Psychological: Normal mood and affect      Labs and Tests:  CBC:   Recent Labs     08/13/20  1432 08/15/20  0436   WBC 2.3* 2.2*   HGB 14.9 13.4*   PLT 90* 53*     BMP:    Recent Labs     08/13/20  1432 08/15/20  0436    135*   K 3.7 3.7   CL 94* 98*   CO2 26 26   BUN 11 5*   CREATININE 0.8* 0.7*   GLUCOSE 108* 96     Hepatic:   Recent Labs     08/13/20  1432 08/15/20  0436   AST 95* 171*   ALT 62* 79*   BILITOT 0.5 1.0   ALKPHOS 104 91       CT Abdomen/pelvis 8/13/2020:  FINDINGS:    Lower Chest: The lung bases are clear.  The visualized heart is unremarkable.         Organs: There is moderate fatty infiltration of the liver.  No gallbladder    stones are seen.  The pancreas, spleen and adrenal glands are unremarkable.         The kidneys enhance symmetrically. There is no hydronephrosis. Lesleigh Kip is no    suspicious renal abnormality.         GI/Bowel: The stomach and small bowel are unremarkable.  The appendix is    normal.  There is no focal mural thickening of the colon appreciated.  No    pericolonic edema is visualized.         The mesenteric vasculature enhances in normal fashion.         Pelvis: The urinary bladder and prostate gland are unremarkable.  No distal    obstructing ureteral stone is seen. No pelvic mass is identified.         Peritoneum/Retroperitoneum: There is no adenopathy or aneurysm.         Bones/Soft Tissues: No acute osseous abnormality is identified. Soft tissues    of the abdominal wall are unremarkable.              Impression    No acute inflammatory abnormality is identified.         No bowel obstruction.         Normal appendix.         Hepatic steatosis. Problem List  Principal Problem:    Acute alcoholic gastritis without hemorrhage  Active Problems:    Seizure-like activity (HCC)    Alcohol dependence with withdrawal (HCC)    Essential hypertension    Elevated LFTs    Frequent hospital admissions  Resolved Problems:    * No resolved hospital problems. *       Assessment & Plan:   1. Acute gastritis. Secondary to ETOH abuse. Continue IV Protonix. Mild upper abdominal tenderness on palpation, recheck lipase. 2. Alcohol abuse/withdrawal. Note tremors, CIWA score 8. Continue prn Ativen. Continue librium, multivitamin, thiamine and folic acid. 3. Elevated LFT. Likely secondary to ETOH abuse. No acute findings on CT scan. LFTs trending up, continue to follow. 4. Hypomagnesemia. Replaced, magnesium level 2.3 today. 5. HTN. Reasonably controlled. Continue amlodipine 10 mg. Continue to follow. 6. Hx seizure. No seizure activity. Continue seizure and fall precautions, CIWA protocol. 7. Thrombocytopenia. Platelet count 12->29Y. Likely secondary ETOH abuse, possibly dilutional. He has not received enoxaparin (has refused). DC IVF, DC enoxaparin (scds ordered).  CBC in am.       Diet: DIET GENERAL;  Code:Full Code  DVT PPX: ADAM Chase - CNP   8/15/2020 9:05 AM

## 2020-08-15 NOTE — ED PROVIDER NOTES
I independently performed a history and physical on [employer]. All diagnostic, treatment, and disposition decisions were made by myself in conjunction with the advanced practice provider. Briefly, this is a 41 y.o. male here for generalized abdominal pain. Patient with history of chronic alcoholism, states he has not had anything to drink for the past 2 days to me, however noted to have alcohol level of 389. He did discuss this finding with patient, states that he does have severe withdrawal symptoms but he wishes to quit drinking and seek treatment. On exam, Patient afebrile and nontoxic. No distress. Heart RRR. Lungs CTAB. Abdomen soft, nondistended, mildly tender to palpation diffusely. No focal right lower quadrant tenderness or Rovsing. No rebound, rigidity, no guarding. Alert, oriented x4, speech slurred. 5/5 motor sensation grossly intact all extremities. PERRL. Screenings   [place] Coma Scale  Eye Opening: Spontaneous  Best Verbal Response: Oriented  Best Motor Response: Obeys commands  [place] Coma Scale Score: 15        MDM    Patient afebrile and nontoxic. No distress. CT scan of the abdomen shows no evidence of acute appendicitis, cholecystitis, perforation or obstruction. No peritoneal signs on exam, clinical suspicion for any acute intra-abdominal infection is very low. Lipase normal, no pancreatitis. CBC with thrombocytopenia and leukopenia,'s suspect secondary to chronic alcohol abuse. No findings to suggest sepsis. Patient does report a desire to quit drinking. States with attempt to quit drinking in the past he is unable to do tremors and nausea, though he does deny seizures. Discussed with internal medicine team and will admit for alcohol withdrawal.      Patient Referrals:  No follow-up provider specified. Discharge Medications:  Current Discharge Medication List          FINAL IMPRESSION  1. Acute alcoholic intoxication with complication ([facility] [address]    2. Generalized abdominal pain        Blood pressure (!) 141/92, pulse 77, temperature 98 °F (36.7 °C), temperature source Oral, resp. rate 16, height 5' 7\" (1.702 m), weight 138 lb 11.2 oz (62.9 kg), SpO2 96 %. For further details of [facility] emergency department encounter, please see documentation by advanced practice provider, HECTOR Wilhelm.     Jose Roberto Perkins,  (electronically signed)  Attending Emergency Physician       Jose Roberto Perkins,   08/15/20 7748

## 2020-08-16 LAB
A/G RATIO: 1.1 (ref 1.1–2.2)
ALBUMIN SERPL-MCNC: 3.7 G/DL (ref 3.4–5)
ALP BLD-CCNC: 95 U/L (ref 40–129)
ALT SERPL-CCNC: 71 U/L (ref 10–40)
ANION GAP SERPL CALCULATED.3IONS-SCNC: 12 MMOL/L (ref 3–16)
AST SERPL-CCNC: 117 U/L (ref 15–37)
BILIRUB SERPL-MCNC: 0.7 MG/DL (ref 0–1)
BUN BLDV-MCNC: 8 MG/DL (ref 7–20)
CALCIUM SERPL-MCNC: 8.7 MG/DL (ref 8.3–10.6)
CHLORIDE BLD-SCNC: 98 MMOL/L (ref 99–110)
CO2: 24 MMOL/L (ref 21–32)
CREAT SERPL-MCNC: 0.8 MG/DL (ref 0.9–1.3)
GFR AFRICAN AMERICAN: >60
GFR NON-AFRICAN AMERICAN: >60
GLOBULIN: 3.3 G/DL
GLUCOSE BLD-MCNC: 121 MG/DL (ref 70–99)
HCT VFR BLD CALC: 36.3 % (ref 40.5–52.5)
HEMOGLOBIN: 12.5 G/DL (ref 13.5–17.5)
MCH RBC QN AUTO: 33.2 PG (ref 26–34)
MCHC RBC AUTO-ENTMCNC: 34.4 G/DL (ref 31–36)
MCV RBC AUTO: 96.5 FL (ref 80–100)
PDW BLD-RTO: 12.3 % (ref 12.4–15.4)
PLATELET # BLD: 59 K/UL (ref 135–450)
PMV BLD AUTO: 8.4 FL (ref 5–10.5)
POTASSIUM SERPL-SCNC: 3.7 MMOL/L (ref 3.5–5.1)
RBC # BLD: 3.76 M/UL (ref 4.2–5.9)
SODIUM BLD-SCNC: 134 MMOL/L (ref 136–145)
TOTAL PROTEIN: 7 G/DL (ref 6.4–8.2)
WBC # BLD: 2.3 K/UL (ref 4–11)

## 2020-08-16 PROCEDURE — C9113 INJ PANTOPRAZOLE SODIUM, VIA: HCPCS | Performed by: INTERNAL MEDICINE

## 2020-08-16 PROCEDURE — 36415 COLL VENOUS BLD VENIPUNCTURE: CPT

## 2020-08-16 PROCEDURE — 2580000003 HC RX 258: Performed by: INTERNAL MEDICINE

## 2020-08-16 PROCEDURE — 85027 COMPLETE CBC AUTOMATED: CPT

## 2020-08-16 PROCEDURE — 2580000003 HC RX 258: Performed by: NURSE PRACTITIONER

## 2020-08-16 PROCEDURE — 80053 COMPREHEN METABOLIC PANEL: CPT

## 2020-08-16 PROCEDURE — 6370000000 HC RX 637 (ALT 250 FOR IP): Performed by: INTERNAL MEDICINE

## 2020-08-16 PROCEDURE — 6360000002 HC RX W HCPCS: Performed by: INTERNAL MEDICINE

## 2020-08-16 PROCEDURE — 6370000000 HC RX 637 (ALT 250 FOR IP): Performed by: NURSE PRACTITIONER

## 2020-08-16 PROCEDURE — 2060000000 HC ICU INTERMEDIATE R&B

## 2020-08-16 RX ORDER — PANTOPRAZOLE SODIUM 40 MG/1
40 TABLET, DELAYED RELEASE ORAL
Status: DISCONTINUED | OUTPATIENT
Start: 2020-08-17 | End: 2020-08-20 | Stop reason: HOSPADM

## 2020-08-16 RX ADMIN — MULTIVITAMIN TABLET 1 TABLET: TABLET at 08:58

## 2020-08-16 RX ADMIN — CHLORDIAZEPOXIDE HYDROCHLORIDE 10 MG: 10 CAPSULE ORAL at 20:17

## 2020-08-16 RX ADMIN — Medication 10 ML: at 20:17

## 2020-08-16 RX ADMIN — PANTOPRAZOLE SODIUM 40 MG: 40 INJECTION, POWDER, FOR SOLUTION INTRAVENOUS at 08:58

## 2020-08-16 RX ADMIN — LORAZEPAM 1 MG: 1 TABLET ORAL at 08:59

## 2020-08-16 RX ADMIN — AMLODIPINE BESYLATE 10 MG: 5 TABLET ORAL at 08:58

## 2020-08-16 RX ADMIN — CHLORDIAZEPOXIDE HYDROCHLORIDE 10 MG: 10 CAPSULE ORAL at 08:58

## 2020-08-16 RX ADMIN — Medication 10 ML: at 08:59

## 2020-08-16 RX ADMIN — Medication 10 ML: at 20:21

## 2020-08-16 RX ADMIN — FOLIC ACID 1 MG: 1 TABLET ORAL at 08:58

## 2020-08-16 RX ADMIN — Medication 100 MG: at 08:58

## 2020-08-16 ASSESSMENT — PAIN SCALES - GENERAL
PAINLEVEL_OUTOF10: 0

## 2020-08-16 NOTE — PROGRESS NOTES
Assessment complete, see doc flowsheet. Patient resting in bed, call light in reach, bed in lowest position, brake set. Patient denies any needs at this time. Patient encouraged to call if needs arise.   Brandon Wilson RN

## 2020-08-16 NOTE — PLAN OF CARE
Problem: Falls - Risk of:  Goal: Will remain free from falls  Description: Will remain free from falls  Outcome: Ongoing  Note: Patient free from harm. ID bands on, bed in lowest position, call light in reach. Patient instructed to call for help if needed. Patient educated on ambulation and safety. Goal: Absence of physical injury  Description: Absence of physical injury  Outcome: Ongoing     Problem: Musculor/Skeletal Functional Status  Goal: Highest potential functional level  Outcome: Ongoing  Goal: Absence of falls  Outcome: Ongoing     Problem: Coping:  Goal: Ability to cope will improve  Description: Ability to cope will improve ppt with ativan avail to help with anxiety, as needed. Outcome: Ongoing  Note: Pt in seizure precautions. Pt has a camerra, seizure pads and alarm engaged on the bed. Pt educated on the camera use. Problem: Health Behavior:  Goal: Ability to manage health-related needs will improve  Description: Ability to manage health-related needs will improve  Outcome: Ongoing     Problem: Physical Regulation:  Goal: Signs of adequate cerebral perfusion will increase  Description: Signs of adequate cerebral perfusion will increase  Outcome: Ongoing  Goal: Ability to maintain a stable neurologic state will improve  Description: Ability to maintain a stable neurologic state will improve  Outcome: Ongoing     Problem: Safety:  Goal: Ability to remain free from injury will improve  Description: Ability to remain free from injury will improve. Patient free from harm. ID bands on, bed in lowest position, call light in reach. Patient instructed to call for help if needed. Patient educated on ambulation and safety. Outcome: Ongoing     Problem: Pain:  Goal: Pain level will decrease  Description: Pain level will decrease. Pain assessed. Patient received relief with medication. Patient offered other alternatives such as distraction. Pain reassessed post medication.     Outcome: Ongoing  Goal: Control of acute pain  Description: Control of acute pain  Outcome: Ongoing  Goal: Control of chronic pain  Description: Control of chronic pain  Outcome: Ongoing     Problem: Coping:  Goal: Ability to cope will improve  Description: Ability to cope will improve ppt with ativan avail to help with anxiety, as needed. Outcome: Ongoing  Note: Pt in seizure precautions. Pt has a camerra, seizure pads and alarm engaged on the bed. Pt educated on the camera use.

## 2020-08-16 NOTE — PROGRESS NOTES
mL, 2 times per day        Objective:  BP (!) 137/95   Pulse 75   Temp 98 °F (36.7 °C) (Oral)   Resp 17   Ht 5' 7\" (1.702 m)   Wt 137 lb 1.6 oz (62.2 kg)   SpO2 99%   BMI 21.47 kg/m²     Intake/Output Summary (Last 24 hours) at 8/16/2020 1222  Last data filed at 8/16/2020 0858  Gross per 24 hour   Intake 500 ml   Output --   Net 500 ml      Wt Readings from Last 3 Encounters:   08/16/20 137 lb 1.6 oz (62.2 kg)   07/26/20 140 lb 6.9 oz (63.7 kg)   06/12/20 146 lb 3.2 oz (66.3 kg)     General:  Awake, alert, oriented in NAD  Skin:  Warm and dry. No unusual bruising or rash  Neck:  Supple. No JVD appreciated  Chest:  Normal effort. Clear to auscultation  Cardiovascular:  RRR, normal S1/S2, no murmur/gallop/rub  Abdomen:  Soft, nontender, +bowel sounds  Extremities:  No edema  Neurological: No focal deficits  Psychological: Normal mood and affect      Labs and Tests:  CBC:   Recent Labs     08/13/20  1432 08/15/20  0436 08/16/20  0428   WBC 2.3* 2.2* 2.3*   HGB 14.9 13.4* 12.5*   PLT 90* 53* 59*     BMP:    Recent Labs     08/13/20  1432 08/15/20  0436 08/16/20  0428    135* 134*   K 3.7 3.7 3.7   CL 94* 98* 98*   CO2 26 26 24   BUN 11 5* 8   CREATININE 0.8* 0.7* 0.8*   GLUCOSE 108* 96 121*     Hepatic:   Recent Labs     08/13/20  1432 08/15/20  0436 08/16/20  0428   AST 95* 171* 117*   ALT 62* 79* 71*   BILITOT 0.5 1.0 0.7   ALKPHOS 104 91 95       CT Abdomen/pelvis 8/13/2020:  FINDINGS:    Lower Chest: The lung bases are clear.  The visualized heart is unremarkable.         Organs: There is moderate fatty infiltration of the liver.  No gallbladder    stones are seen.  The pancreas, spleen and adrenal glands are unremarkable.         The kidneys enhance symmetrically. There is no hydronephrosis. Shaw Maryann is no    suspicious renal abnormality.         GI/Bowel: The stomach and small bowel are unremarkable.  The appendix is    normal.  There is no focal mural thickening of the colon appreciated.  No pericolonic edema is visualized.         The mesenteric vasculature enhances in normal fashion.         Pelvis: The urinary bladder and prostate gland are unremarkable.  No distal    obstructing ureteral stone is seen. No pelvic mass is identified.         Peritoneum/Retroperitoneum: There is no adenopathy or aneurysm.         Bones/Soft Tissues: No acute osseous abnormality is identified. Soft tissues    of the abdominal wall are unremarkable.              Impression    No acute inflammatory abnormality is identified.         No bowel obstruction.         Normal appendix.         Hepatic steatosis. Problem List  Principal Problem:    Acute alcoholic gastritis without hemorrhage  Active Problems:    Seizure-like activity (HCC)    Alcohol dependence with withdrawal (HCC)    Essential hypertension    Elevated LFTs    Frequent hospital admissions  Resolved Problems:    * No resolved hospital problems. *       Assessment & Plan:   1. Acute gastritis. Secondary to ETOH abuse. Abdominal pain resolved. Transition to po Protonix. 2. Alcohol abuse/withdrawal. Continues to have tremors, CIWA score 8 this am. Received 3 doses of po Ativan past 24 hours. Continue supportive treatment including prn Ativan and scheduled librium, multivitamin, thiamine and folic acid. 3. Elevated LFT. Likely secondary to ETOH abuse. No acute findings on CT scan. LFTs trending down, continue to follow. 4. Hypomagnesemia. Replaced, magnesium level 2.3 (8/15). 5. HTN. Reasonably controlled. Continue amlodipine 10 mg. Continue to follow. 6. Hx seizure. No seizure activity. Continue seizure and fall precautions, [facility] protocol. 7. Thrombocytopenia. Platelet count 01->29->64B. Likely secondary ETOH abuse. Trending up, continue to monitor.  CBC in am.       Diet: DIET GENERAL;  Code:Full Code  DVT PPX: scds (refused enoxaparin)      ADAM Gresham - CNP   8/16/2020 12:22 PM

## 2020-08-17 LAB
A/G RATIO: 1.2 (ref 1.1–2.2)
ALBUMIN SERPL-MCNC: 3.8 G/DL (ref 3.4–5)
ALP BLD-CCNC: 85 U/L (ref 40–129)
ALT SERPL-CCNC: 84 U/L (ref 10–40)
ANION GAP SERPL CALCULATED.3IONS-SCNC: 9 MMOL/L (ref 3–16)
AST SERPL-CCNC: 102 U/L (ref 15–37)
BILIRUB SERPL-MCNC: 0.5 MG/DL (ref 0–1)
BUN BLDV-MCNC: 7 MG/DL (ref 7–20)
CALCIUM SERPL-MCNC: 8.9 MG/DL (ref 8.3–10.6)
CHLORIDE BLD-SCNC: 99 MMOL/L (ref 99–110)
CO2: 26 MMOL/L (ref 21–32)
CREAT SERPL-MCNC: 0.7 MG/DL (ref 0.9–1.3)
GFR AFRICAN AMERICAN: >60
GFR NON-AFRICAN AMERICAN: >60
GLOBULIN: 3.2 G/DL
GLUCOSE BLD-MCNC: 100 MG/DL (ref 70–99)
HCT VFR BLD CALC: 36.3 % (ref 40.5–52.5)
HEMOGLOBIN: 12.6 G/DL (ref 13.5–17.5)
MCH RBC QN AUTO: 33.7 PG (ref 26–34)
MCHC RBC AUTO-ENTMCNC: 34.6 G/DL (ref 31–36)
MCV RBC AUTO: 97.4 FL (ref 80–100)
PDW BLD-RTO: 12.4 % (ref 12.4–15.4)
PLATELET # BLD: 66 K/UL (ref 135–450)
PMV BLD AUTO: 8.3 FL (ref 5–10.5)
POTASSIUM SERPL-SCNC: 3.8 MMOL/L (ref 3.5–5.1)
RBC # BLD: 3.73 M/UL (ref 4.2–5.9)
SODIUM BLD-SCNC: 134 MMOL/L (ref 136–145)
TOTAL PROTEIN: 7 G/DL (ref 6.4–8.2)
WBC # BLD: 3.7 K/UL (ref 4–11)

## 2020-08-17 PROCEDURE — 6370000000 HC RX 637 (ALT 250 FOR IP): Performed by: NURSE PRACTITIONER

## 2020-08-17 PROCEDURE — 85027 COMPLETE CBC AUTOMATED: CPT

## 2020-08-17 PROCEDURE — 2580000003 HC RX 258: Performed by: INTERNAL MEDICINE

## 2020-08-17 PROCEDURE — 94760 N-INVAS EAR/PLS OXIMETRY 1: CPT

## 2020-08-17 PROCEDURE — 80053 COMPREHEN METABOLIC PANEL: CPT

## 2020-08-17 PROCEDURE — 6370000000 HC RX 637 (ALT 250 FOR IP): Performed by: INTERNAL MEDICINE

## 2020-08-17 PROCEDURE — 36415 COLL VENOUS BLD VENIPUNCTURE: CPT

## 2020-08-17 PROCEDURE — 2060000000 HC ICU INTERMEDIATE R&B

## 2020-08-17 RX ADMIN — CHLORDIAZEPOXIDE HYDROCHLORIDE 10 MG: 10 CAPSULE ORAL at 21:53

## 2020-08-17 RX ADMIN — Medication 100 MG: at 09:48

## 2020-08-17 RX ADMIN — AMLODIPINE BESYLATE 10 MG: 5 TABLET ORAL at 09:49

## 2020-08-17 RX ADMIN — PANTOPRAZOLE SODIUM 40 MG: 40 TABLET, DELAYED RELEASE ORAL at 06:23

## 2020-08-17 RX ADMIN — MULTIVITAMIN TABLET 1 TABLET: TABLET at 09:48

## 2020-08-17 RX ADMIN — Medication 10 ML: at 21:54

## 2020-08-17 RX ADMIN — FOLIC ACID 1 MG: 1 TABLET ORAL at 09:48

## 2020-08-17 RX ADMIN — CHLORDIAZEPOXIDE HYDROCHLORIDE 10 MG: 10 CAPSULE ORAL at 09:48

## 2020-08-17 ASSESSMENT — PAIN SCALES - GENERAL
PAINLEVEL_OUTOF10: 0

## 2020-08-17 NOTE — PLAN OF CARE
Problem: Falls - Risk of:  Goal: Will remain free from falls  Description: Will remain free from falls  8/17/2020 1614 by Rosanna Wiley RN  Outcome: Ongoing  8/17/2020 0403 by Jaki aMjano RN  Outcome: Met This Shift     Problem: Musculor/Skeletal Functional Status  Goal: Highest potential functional level  Outcome: Ongoing     Problem: Musculor/Skeletal Functional Status  Goal: Absence of falls  8/17/2020 1614 by Rosanna Wiley RN  Outcome: Ongoing

## 2020-08-17 NOTE — PLAN OF CARE
Problem: Falls - Risk of:  Goal: Will remain free from falls  Description: Will remain free from falls  Outcome: Met This Shift  Goal: Absence of physical injury  Description: Absence of physical injury  Outcome: Met This Shift     Problem: Musculor/Skeletal Functional Status  Goal: Absence of falls  Outcome: Met This Shift     Problem: Physical Regulation:  Goal: Ability to maintain a stable neurologic state will improve  Description: Ability to maintain a stable neurologic state will improve  Outcome: Met This Shift     Problem: Pain:  Goal: Pain level will decrease  Description: Pain level will decrease. Pain assessed. Patient received relief with medication. Patient offered other alternatives such as distraction. Pain reassessed post medication.     Outcome: Met This Shift

## 2020-08-17 NOTE — PROGRESS NOTES
Soft, non-tender, non-distended with normal bowel sounds. Musculoskeletal: No clubbing, cyanosis or edema bilaterally. Full range of motion without deformity. Skin: Skin color, texture, turgor normal.  No rashes or lesions. Neurologic:  Neurovascularly intact without any focal sensory/motor deficits. Cranial nerves: II-XII intact, grossly non-focal.  Psychiatric: Alert and oriented, thought content appropriate, normal insight  Capillary Refill: Brisk,< 3 seconds   Peripheral Pulses: +2 palpable, equal bilaterally       Labs:   Recent Labs     08/15/20  0436 08/16/20  0428 08/17/20  0424   WBC 2.2* 2.3* 3.7*   HGB 13.4* 12.5* 12.6*   HCT 38.6* 36.3* 36.3*   PLT 53* 59* 66*     Recent Labs     08/15/20  0436 08/16/20  0428 08/17/20  0424   * 134* 134*   K 3.7 3.7 3.8   CL 98* 98* 99   CO2 26 24 26   BUN 5* 8 7   CREATININE 0.7* 0.8* 0.7*   CALCIUM 8.8 8.7 8.9     Recent Labs     08/15/20  0436 08/16/20  0428 08/17/20  0424   * 117* 102*   ALT 79* 71* 84*   BILITOT 1.0 0.7 0.5   ALKPHOS 91 95 85     No results for input(s): INR in the last 72 hours. No results for input(s): Jelena Sylvia in the last 72 hours. Urinalysis:      Lab Results   Component Value Date    NITRU Negative 08/13/2020    WBCUA 1 08/13/2020    BACTERIA 1+ 07/20/2020    RBCUA 1 08/13/2020    BLOODU Negative 08/13/2020    SPECGRAV >1.030 08/13/2020    GLUCOSEU Negative 08/13/2020       Radiology:  CT ABDOMEN PELVIS W IV CONTRAST Additional Contrast? None   Final Result   No acute inflammatory abnormality is identified. No bowel obstruction. Normal appendix. Hepatic steatosis. Assessment/Plan:    Active Hospital Problems    Diagnosis    Essential hypertension [I10]    Elevated LFTs [R94.5]    Frequent hospital admissions [Z78.9]    Acute alcoholic gastritis without hemorrhage [K29.20]    Alcohol dependence with withdrawal ([facility] [address] [facility] [F10.239]    Seizure-like activity ([email or web address] [R56.9]     1.  Acute gastritis. Secondary to ETOH abuse. Abdominal pain resolved. Transition to po Protonix. 2. Alcohol abuse/withdrawal. Continue supportive treatment including prn Ativan and scheduled librium- taper, multivitamin, thiamine and folic acid. 3. Elevated LFT. Likely secondary to ETOH abuse. No acute findings on CT scan. LFTs trending down, continue to follow. 4. Hypomagnesemia. Replaced, magnesium level 2.3 (8/15). 5. HTN. Reasonably controlled. Continue amlodipine 10 mg. Continue to follow. 6. Hx seizure. No seizure activity. Continue seizure and fall precautions, CIWA protocol. 7. Thrombocytopenia. Platelet count 54->85->66M. Likely secondary ETOH abuse. Trending up, continue to monitor.  CBC in am.        DVT Prophylaxis: lovenox   Diet: DIET GENERAL;  Code Status: Full Code    PT/OT Eval Status: na   CM consulted and given resources     Dispo - suspect home tomorrow     ADAM Chance - CNP

## 2020-08-17 NOTE — CARE COORDINATION
Discharge Planning Assessment     SW discharge planner met with patient  to discuss reason for admission, current living situation, and potential needs at the time of discharge     Demographics/Insurance verified Judy Leif- Medicaid Pending.     Current type of dwelling: apartment     Patient from ECF/SW confirmed with: n/a     Living arrangements: Patient states that he lives with a friend     Level of function/Support: Patient reports that he is independent with ADLs and IADLs.    PCP:  none     Last Visit to [address] [place] [address] with any community resources/agencies/skilled home care: none     Medication compliance issues: none     Financial issues that could impact healthcare: Medicaid pending     Tentative discharge plan: PT recommending SNF for PT. Patient stating that he is open to SNF placement .       Discussed and provided facilities of choice that patient could transition to for skilled nursing facility placement at discharge      Discussed with patient and/or family that on the day of discharge home tentative time of discharge will be between 10 AM and noon.      Transportation at the time of discharge: Patient  Stating that his car is at [facility] in parking lot. RITCHIE completed readmission assessment with Patient. Electronically signed by BIJAN Recio on 8/17/2020 at 10:13 AM     ADDENDUM:  Referred patient to the [facility]. The [facility] not able to accept patient at this time. Electronically signed by BIJAN Recio on 8/17/2020 at 5:19 PM                                                        PT recommending SNF placement. RITCHIE met with patient and patient stating that he is open to discharging to SNF for short-term rehab.

## 2020-08-18 LAB
A/G RATIO: 1.1 (ref 1.1–2.2)
ALBUMIN SERPL-MCNC: 3.7 G/DL (ref 3.4–5)
ALP BLD-CCNC: 78 U/L (ref 40–129)
ALT SERPL-CCNC: 122 U/L (ref 10–40)
ANION GAP SERPL CALCULATED.3IONS-SCNC: 10 MMOL/L (ref 3–16)
AST SERPL-CCNC: 124 U/L (ref 15–37)
BILIRUB SERPL-MCNC: 0.3 MG/DL (ref 0–1)
BUN BLDV-MCNC: 8 MG/DL (ref 7–20)
CALCIUM SERPL-MCNC: 9.2 MG/DL (ref 8.3–10.6)
CHLORIDE BLD-SCNC: 102 MMOL/L (ref 99–110)
CO2: 27 MMOL/L (ref 21–32)
CREAT SERPL-MCNC: 0.7 MG/DL (ref 0.9–1.3)
GFR AFRICAN AMERICAN: >60
GFR NON-AFRICAN AMERICAN: >60
GLOBULIN: 3.4 G/DL
GLUCOSE BLD-MCNC: 100 MG/DL (ref 70–99)
HCT VFR BLD CALC: 35.5 % (ref 40.5–52.5)
HEMOGLOBIN: 12.2 G/DL (ref 13.5–17.5)
MCH RBC QN AUTO: 33.4 PG (ref 26–34)
MCHC RBC AUTO-ENTMCNC: 34.2 G/DL (ref 31–36)
MCV RBC AUTO: 97.6 FL (ref 80–100)
PDW BLD-RTO: 12.5 % (ref 12.4–15.4)
PLATELET # BLD: 95 K/UL (ref 135–450)
PMV BLD AUTO: 8 FL (ref 5–10.5)
POTASSIUM SERPL-SCNC: 3.6 MMOL/L (ref 3.5–5.1)
RBC # BLD: 3.64 M/UL (ref 4.2–5.9)
SODIUM BLD-SCNC: 139 MMOL/L (ref 136–145)
TOTAL PROTEIN: 7.1 G/DL (ref 6.4–8.2)
WBC # BLD: 2.8 K/UL (ref 4–11)

## 2020-08-18 PROCEDURE — 6370000000 HC RX 637 (ALT 250 FOR IP): Performed by: NURSE PRACTITIONER

## 2020-08-18 PROCEDURE — 94760 N-INVAS EAR/PLS OXIMETRY 1: CPT

## 2020-08-18 PROCEDURE — 2580000003 HC RX 258: Performed by: NURSE PRACTITIONER

## 2020-08-18 PROCEDURE — 2060000000 HC ICU INTERMEDIATE R&B

## 2020-08-18 PROCEDURE — 36415 COLL VENOUS BLD VENIPUNCTURE: CPT

## 2020-08-18 PROCEDURE — 6370000000 HC RX 637 (ALT 250 FOR IP): Performed by: INTERNAL MEDICINE

## 2020-08-18 PROCEDURE — 85027 COMPLETE CBC AUTOMATED: CPT

## 2020-08-18 PROCEDURE — 82390 ASSAY OF CERULOPLASMIN: CPT

## 2020-08-18 PROCEDURE — 80053 COMPREHEN METABOLIC PANEL: CPT

## 2020-08-18 RX ORDER — CHLORDIAZEPOXIDE HYDROCHLORIDE 10 MG/1
10 CAPSULE, GELATIN COATED ORAL DAILY
Status: COMPLETED | OUTPATIENT
Start: 2020-08-18 | End: 2020-08-20

## 2020-08-18 RX ADMIN — Medication 100 MG: at 08:37

## 2020-08-18 RX ADMIN — CHLORDIAZEPOXIDE HYDROCHLORIDE 10 MG: 10 CAPSULE ORAL at 08:37

## 2020-08-18 RX ADMIN — FOLIC ACID 1 MG: 1 TABLET ORAL at 08:37

## 2020-08-18 RX ADMIN — MULTIVITAMIN TABLET 1 TABLET: TABLET at 08:37

## 2020-08-18 RX ADMIN — PANTOPRAZOLE SODIUM 40 MG: 40 TABLET, DELAYED RELEASE ORAL at 06:53

## 2020-08-18 RX ADMIN — AMLODIPINE BESYLATE 10 MG: 5 TABLET ORAL at 08:37

## 2020-08-18 RX ADMIN — Medication 10 ML: at 21:07

## 2020-08-18 ASSESSMENT — PAIN SCALES - GENERAL
PAINLEVEL_OUTOF10: 0

## 2020-08-18 NOTE — PLAN OF CARE
Problem: Falls - Risk of:  Goal: Will remain free from falls  Description: Will remain free from falls  Outcome: Ongoing  Note: Pt low fall risk, ambulates per self with steady gait.  Instructed pt to call nurse if he has issues with ambulation

## 2020-08-18 NOTE — PLAN OF CARE
Problem: Falls - Risk of:  Goal: Will remain free from falls  Description: Will remain free from falls  8/18/2020 0233 by Adrian Campos RN  Outcome: Met This Shift  8/17/2020 1614 by Godwin Rubinstein, RN  Outcome: Ongoing  Goal: Absence of physical injury  Description: Absence of physical injury  8/18/2020 0233 by Adrian Campos RN  Outcome: Met This Shift  8/17/2020 1614 by Godwin Rubinstein, RN  Outcome: Ongoing     Problem: Musculor/Skeletal Functional Status  Goal: Absence of falls  8/18/2020 0233 by Adrian Campos RN  Outcome: Met This Shift  8/17/2020 1614 by Godwin Rubinstein, RN  Outcome: Ongoing     Problem: Physical Regulation:  Goal: Signs of adequate cerebral perfusion will increase  Description: Signs of adequate cerebral perfusion will increase  8/18/2020 0233 by Adrian Campos RN  Outcome: Met This Shift  8/17/2020 1614 by Godwin Rubinstein, RN  Outcome: Ongoing     Problem: Safety:  Goal: Ability to remain free from injury will improve  Description: Ability to remain free from injury will improve. Patient free from harm. ID bands on, bed in lowest position, call light in reach. Patient instructed to call for help if needed. Patient educated on ambulation and safety. 8/18/2020 0233 by Adrian Campos RN  Outcome: Met This Shift  8/17/2020 1614 by Godwin Rubinstein, RN  Outcome: Ongoing     Problem: Pain:  Goal: Pain level will decrease  Description: Pain level will decrease. Pain assessed. Patient received relief with medication. Patient offered other alternatives such as distraction. Pain reassessed post medication.     8/18/2020 0233 by Adrian Campos RN  Outcome: Met This Shift  8/17/2020 1614 by Godwin Rubinstein, RN  Outcome: Ongoing  Goal: Control of acute pain  Description: Control of acute pain  8/18/2020 0233 by Adrian Campos RN  Outcome: Met This Shift  8/17/2020 1614 by Godwin Rubinstein, RN  Outcome: Ongoing  Goal: Control of chronic pain  Description: Control of chronic pain  8/18/2020 0233 by Rosalva Lipoma, RN  Outcome: Met This Shift  8/17/2020 1614 by Kendrick Krishnamurthy RN  Outcome: Ongoing

## 2020-08-18 NOTE — PROGRESS NOTES
[facility]    8/18/2020 8:32 AM        Name: Kell Jones . Admitted: 8/13/2020  Primary Care Provider: No primary care provider on file. (Tel: None)      Subjective:  Patient is a 42 yo male with hx HTN, ETOH abuse. He presented with c/o abdominal pain associated with n/v and HA. ETOH level 389 on presentation. CT abdomen/pelvis unremarkable. He has had recurrent hospital admissions related to alcohol. Reviewed interval ancillary notes    No acute events overnights.   No tremors or hallucinations     Current Medications  chlordiazePOXIDE (LIBRIUM) capsule 10 mg, Daily  pantoprazole (PROTONIX) tablet 40 mg, QAM AC  folic acid (FOLVITE) tablet 1 mg, Daily  multivitamin 1 tablet, Daily  sodium chloride flush 0.9 % injection 10 mL, 2 times per day  sodium chloride flush 0.9 % injection 10 mL, PRN  acetaminophen (TYLENOL) tablet 650 mg, Q6H PRN    Or  acetaminophen (TYLENOL) suppository 650 mg, Q6H PRN  polyethylene glycol (GLYCOLAX) packet 17 g, Daily PRN  promethazine (PHENERGAN) tablet 12.5 mg, Q6H PRN    Or  ondansetron (ZOFRAN) injection 4 mg, Q6H PRN  amLODIPine (NORVASC) tablet 10 mg, Daily  vitamin B-1 (THIAMINE) tablet 100 mg, Daily  LORazepam (ATIVAN) tablet 1 mg, Q1H PRN    Or  LORazepam (ATIVAN) injection 1 mg, Q1H PRN    Or  LORazepam (ATIVAN) tablet 2 mg, Q1H PRN    Or  LORazepam (ATIVAN) injection 2 mg, Q1H PRN    Or  LORazepam (ATIVAN) tablet 3 mg, Q1H PRN    Or  LORazepam (ATIVAN) injection 3 mg, Q1H PRN    Or  LORazepam (ATIVAN) tablet 4 mg, Q1H PRN    Or  LORazepam (ATIVAN) injection 4 mg, Q1H PRN  sodium chloride flush 0.9 % injection 10 mL, 2 times per day        Objective:  /89   Pulse 80   Temp 98 °F (36.7 °C) (Oral)   Resp 16   Ht 5' 7\" (1.702 m)   Wt 135 lb 14.4 oz (61.6 kg)   SpO2 100%   BMI 21.28 kg/m²   No intake or output data in the 24 hours ending 08/18/20 0394 Wt Readings from Last 3 Encounters:   08/18/20 135 lb 14.4 oz (61.6 kg)   07/26/20 140 lb 6.9 oz (63.7 kg)   06/12/20 146 lb 3.2 oz (66.3 kg)     General:  Awake, alert, oriented in NAD  Skin:  Warm and dry. No unusual bruising or rash  Neck:  Supple. No JVD appreciated  Chest:  Normal effort. Clear to auscultation  Cardiovascular:  RRR, normal S1/S2, no murmur/gallop/rub  Abdomen:  Soft, nontender, +bowel sounds  Extremities:  No edema  Neurological: No focal deficits  Psychological: Normal mood and affect      Labs and Tests:  CBC:   Recent Labs     08/16/20 0428 08/17/20  0424 08/18/20  0440   WBC 2.3* 3.7* 2.8*   HGB 12.5* 12.6* 12.2*   PLT 59* 66* 95*     BMP:    Recent Labs     08/16/20 0428 08/17/20  0424 08/18/20  0440   * 134* 139   K 3.7 3.8 3.6   CL 98* 99 102   CO2 24 26 27   BUN 8 7 8   CREATININE 0.8* 0.7* 0.7*   GLUCOSE 121* 100* 100*     Hepatic:   Recent Labs     08/16/20 0428 08/17/20  0424 08/18/20  0440   * 102* 124*   ALT 71* 84* 122*   BILITOT 0.7 0.5 0.3   ALKPHOS 95 85 78       CT Abdomen/pelvis 8/13/2020:  FINDINGS:    Lower Chest: The lung bases are clear.  The visualized heart is unremarkable.         Organs: There is moderate fatty infiltration of the liver.  No gallbladder    stones are seen.  The pancreas, spleen and adrenal glands are unremarkable.         The kidneys enhance symmetrically. There is no hydronephrosis. Martha Gene is no    suspicious renal abnormality.         GI/Bowel: The stomach and small bowel are unremarkable.  The appendix is    normal.  There is no focal mural thickening of the colon appreciated. No    pericolonic edema is visualized.         The mesenteric vasculature enhances in normal fashion.         Pelvis: The urinary bladder and prostate gland are unremarkable.  No distal    obstructing ureteral stone is seen.  No pelvic mass is identified.         Peritoneum/Retroperitoneum: There is no adenopathy or aneurysm.         Bones/Soft Tissues: No acute osseous abnormality is identified. Soft tissues    of the abdominal wall are unremarkable.              Impression    No acute inflammatory abnormality is identified.         No bowel obstruction.         Normal appendix.         Hepatic steatosis. Problem List  Principal Problem:    Acute alcoholic gastritis without hemorrhage  Active Problems:    Seizure-like activity (HCC)    Alcohol dependence with withdrawal (HCC)    Essential hypertension    Elevated LFTs    Frequent hospital admissions  Resolved Problems:    * No resolved hospital problems. *       Assessment & Plan:   1. Acute gastritis. Secondary to ETOH abuse. Abdominal pain resolved. Transition to po Protonix. 2. Alcohol abuse/withdrawal. Continues to have tremors, CIWA score 8 this am. Received 3 doses of po Ativan past 24 hours. Continue supportive treatment including prn Ativan and scheduled librium, multivitamin, thiamine and folic acid. 3. Elevated LFT. Likely secondary to ETOH abuse. No acute findings on CT scan. LFTs trending down, continue to follow. 4. Hypomagnesemia. Replaced, magnesium level 2.3 (8/15). 5. HTN. Reasonably controlled. Continue amlodipine 10 mg. Continue to follow. 6. Hx seizure. No seizure activity. Continue seizure and fall precautions, CIWA protocol. 7. Thrombocytopenia. Platelet count 09->38->25H. Likely secondary ETOH abuse. Trending up, continue to monitor.  CBC in am.       Diet: DIET GENERAL;  Code:Full Code  DVT PPX: scds (refused enoxaparin)      ADAM Bettencourt - CNP   8/18/2020 8:32 AM

## 2020-08-19 LAB
A/G RATIO: 1.2 (ref 1.1–2.2)
ALBUMIN SERPL-MCNC: 3.7 G/DL (ref 3.4–5)
ALP BLD-CCNC: 68 U/L (ref 40–129)
ALT SERPL-CCNC: 192 U/L (ref 10–40)
ANION GAP SERPL CALCULATED.3IONS-SCNC: 10 MMOL/L (ref 3–16)
AST SERPL-CCNC: 158 U/L (ref 15–37)
BILIRUB SERPL-MCNC: 0.3 MG/DL (ref 0–1)
BUN BLDV-MCNC: 8 MG/DL (ref 7–20)
CALCIUM SERPL-MCNC: 9 MG/DL (ref 8.3–10.6)
CHLORIDE BLD-SCNC: 103 MMOL/L (ref 99–110)
CO2: 26 MMOL/L (ref 21–32)
CREAT SERPL-MCNC: 0.8 MG/DL (ref 0.9–1.3)
ETHANOL: NORMAL MG/DL (ref 0–0.08)
FERRITIN: 1830 NG/ML (ref 30–400)
GFR AFRICAN AMERICAN: >60
GFR NON-AFRICAN AMERICAN: >60
GLOBULIN: 3.2 G/DL
GLUCOSE BLD-MCNC: 102 MG/DL (ref 70–99)
HCT VFR BLD CALC: 34.9 % (ref 40.5–52.5)
HEMOGLOBIN: 12 G/DL (ref 13.5–17.5)
IRON SATURATION: 45 % (ref 20–50)
IRON: 90 UG/DL (ref 59–158)
MCH RBC QN AUTO: 33.4 PG (ref 26–34)
MCHC RBC AUTO-ENTMCNC: 34.2 G/DL (ref 31–36)
MCV RBC AUTO: 97.6 FL (ref 80–100)
PDW BLD-RTO: 12.7 % (ref 12.4–15.4)
PLATELET # BLD: 121 K/UL (ref 135–450)
PMV BLD AUTO: 7.9 FL (ref 5–10.5)
POTASSIUM SERPL-SCNC: 3.3 MMOL/L (ref 3.5–5.1)
RBC # BLD: 3.58 M/UL (ref 4.2–5.9)
SODIUM BLD-SCNC: 139 MMOL/L (ref 136–145)
TOTAL IRON BINDING CAPACITY: 198 UG/DL (ref 260–445)
TOTAL PROTEIN: 6.9 G/DL (ref 6.4–8.2)
WBC # BLD: 2.7 K/UL (ref 4–11)

## 2020-08-19 PROCEDURE — 82728 ASSAY OF FERRITIN: CPT

## 2020-08-19 PROCEDURE — 6370000000 HC RX 637 (ALT 250 FOR IP): Performed by: INTERNAL MEDICINE

## 2020-08-19 PROCEDURE — 80053 COMPREHEN METABOLIC PANEL: CPT

## 2020-08-19 PROCEDURE — 83550 IRON BINDING TEST: CPT

## 2020-08-19 PROCEDURE — 2060000000 HC ICU INTERMEDIATE R&B

## 2020-08-19 PROCEDURE — 83540 ASSAY OF IRON: CPT

## 2020-08-19 PROCEDURE — 6370000000 HC RX 637 (ALT 250 FOR IP): Performed by: NURSE PRACTITIONER

## 2020-08-19 PROCEDURE — 80074 ACUTE HEPATITIS PANEL: CPT

## 2020-08-19 PROCEDURE — 2580000003 HC RX 258: Performed by: NURSE PRACTITIONER

## 2020-08-19 PROCEDURE — 36415 COLL VENOUS BLD VENIPUNCTURE: CPT

## 2020-08-19 PROCEDURE — 2580000003 HC RX 258: Performed by: INTERNAL MEDICINE

## 2020-08-19 PROCEDURE — G0480 DRUG TEST DEF 1-7 CLASSES: HCPCS

## 2020-08-19 PROCEDURE — 85027 COMPLETE CBC AUTOMATED: CPT

## 2020-08-19 PROCEDURE — 86706 HEP B SURFACE ANTIBODY: CPT

## 2020-08-19 RX ORDER — SODIUM CHLORIDE 9 MG/ML
INJECTION, SOLUTION INTRAVENOUS CONTINUOUS
Status: DISCONTINUED | OUTPATIENT
Start: 2020-08-19 | End: 2020-08-20 | Stop reason: HOSPADM

## 2020-08-19 RX ORDER — POTASSIUM CHLORIDE 20 MEQ/1
20 TABLET, EXTENDED RELEASE ORAL ONCE
Status: COMPLETED | OUTPATIENT
Start: 2020-08-19 | End: 2020-08-19

## 2020-08-19 RX ADMIN — SODIUM CHLORIDE: 9 INJECTION, SOLUTION INTRAVENOUS at 09:07

## 2020-08-19 RX ADMIN — FOLIC ACID 1 MG: 1 TABLET ORAL at 09:03

## 2020-08-19 RX ADMIN — POTASSIUM CHLORIDE 20 MEQ: 1500 TABLET, EXTENDED RELEASE ORAL at 09:05

## 2020-08-19 RX ADMIN — AMLODIPINE BESYLATE 10 MG: 5 TABLET ORAL at 09:03

## 2020-08-19 RX ADMIN — MULTIVITAMIN TABLET 1 TABLET: TABLET at 09:02

## 2020-08-19 RX ADMIN — Medication 10 ML: at 22:29

## 2020-08-19 RX ADMIN — Medication 10 ML: at 21:05

## 2020-08-19 RX ADMIN — PANTOPRAZOLE SODIUM 40 MG: 40 TABLET, DELAYED RELEASE ORAL at 06:47

## 2020-08-19 RX ADMIN — Medication 100 MG: at 09:03

## 2020-08-19 RX ADMIN — Medication 10 ML: at 09:03

## 2020-08-19 RX ADMIN — LORAZEPAM 1 MG: 1 TABLET ORAL at 09:03

## 2020-08-19 RX ADMIN — CHLORDIAZEPOXIDE HYDROCHLORIDE 10 MG: 10 CAPSULE ORAL at 09:03

## 2020-08-19 RX ADMIN — Medication 10 ML: at 09:04

## 2020-08-19 ASSESSMENT — PAIN SCALES - GENERAL
PAINLEVEL_OUTOF10: 0

## 2020-08-19 NOTE — DISCHARGE SUMMARY
appearance:  No apparent distress, appears stated age and cooperative. HEENT:  Normal cephalic, atraumatic without obvious deformity. Pupils equal, round, and reactive to light. Extra ocular muscles intact. Conjunctivae/corneas clear. Neck: Supple, with full range of motion. No jugular venous distention. Trachea midline. Respiratory:  Normal respiratory effort. Clear to auscultation, bilaterally without Rales/Wheezes/Rhonchi. Cardiovascular:  Regular rate and rhythm with normal S1/S2 without murmurs, rubs or gallops. Abdomen: Soft, non-tender, non-distended with normal bowel sounds. Musculoskeletal:  No clubbing, cyanosis or edema bilaterally. Full range of motion without deformity. Skin: Skin color, texture, turgor normal.  No rashes or lesions. Neurologic:  Neurovascularly intact without any focal sensory/motor deficits. Cranial nerves: II-XII intact, grossly non-focal.  Psychiatric:  Alert and oriented, thought content appropriate, normal insight  Capillary Refill: Brisk,< 3 seconds   Peripheral Pulses: +2 palpable, equal bilaterally       Labs: For convenience and continuity at follow-up the following most recent labs are provided:      CBC:    Lab Results   Component Value Date    WBC 2.7 08/19/2020    HGB 12.0 08/19/2020    HCT 34.9 08/19/2020     08/19/2020       Renal:    Lab Results   Component Value Date     08/19/2020    K 3.3 08/19/2020    K 3.9 07/26/2020     08/19/2020    CO2 26 08/19/2020    BUN 8 08/19/2020    CREATININE 0.8 08/19/2020    CALCIUM 9.0 08/19/2020    PHOS 3.2 08/14/2020         Significant Diagnostic Studies    Radiology:   CT ABDOMEN PELVIS W IV CONTRAST Additional Contrast? None   Final Result   No acute inflammatory abnormality is identified. No bowel obstruction. Normal appendix. Hepatic steatosis.                 Consults:     IP CONSULT TO HOSPITALIST  IP CONSULT TO SOCIAL WORK    Disposition:  Home     Condition at Discharge: Stable    Discharge Instructions/Follow-up:    [facility] rehab, GI and PCP     Code Status:  Full Code     Activity: activity as tolerated    Diet: regular diet      Discharge Medications:     Current Discharge Medication List           Details   folic acid (FOLVITE) 1 MG tablet Take 1 tablet by mouth daily  Qty: 30 tablet, Refills: 3      pantoprazole (PROTONIX) 40 MG tablet Take 1 tablet by mouth every morning (before breakfast)  Qty: 30 tablet, Refills: 3      vitamin B-1 100 MG tablet Take 1 tablet by mouth daily  Qty: 30 tablet, Refills: 3      Multiple Vitamin (MULTIVITAMIN) TABS tablet Take 1 tablet by mouth daily      ondansetron (ZOFRAN ODT) 4 MG disintegrating tablet Take 1 tablet by mouth every 8 hours as needed for Nausea  Qty: 20 tablet, Refills: 0      amLODIPine (NORVASC) 10 MG tablet Take 1 tablet by mouth daily  Qty: 30 tablet, Refills: 3             Time Spent on discharge is more than 30 minutes in the examination, evaluation, counseling and review of medications and discharge plan. Signed:    ADAM Art CNP   8/19/2020      Thank you No primary care provider on file. for the opportunity to be involved in this patient's care. If you have any questions or concerns please feel free to contact me at [phone].

## 2020-08-19 NOTE — PROGRESS NOTES
non-distended with normal bowel sounds. Musculoskeletal: No clubbing, cyanosis or edema bilaterally. Full range of motion without deformity. Skin: Skin color, texture, turgor normal.  No rashes or lesions. Neurologic:  Neurovascularly intact without any focal sensory/motor deficits. Cranial nerves: II-XII intact, grossly non-focal.  Psychiatric: Alert and oriented, thought content appropriate, normal insight  Capillary Refill: Brisk,< 3 seconds   Peripheral Pulses: +2 palpable, equal bilaterally       Labs:   Recent Labs     08/17/20 0424 08/18/20  0440 08/19/20  0441   WBC 3.7* 2.8* 2.7*   HGB 12.6* 12.2* 12.0*   HCT 36.3* 35.5* 34.9*   PLT 66* 95* 121*     Recent Labs     08/17/20 0424 08/18/20 0440 08/19/20  0441   * 139 139   K 3.8 3.6 3.3*   CL 99 102 103   CO2 26 27 26   BUN 7 8 8   CREATININE 0.7* 0.7* 0.8*   CALCIUM 8.9 9.2 9.0     Recent Labs     08/17/20 0424 08/18/20 0440 08/19/20  0441   * 124* 158*   ALT 84* 122* 192*   BILITOT 0.5 0.3 0.3   ALKPHOS 85 78 68     No results for input(s): INR in the last 72 hours. No results for input(s): [place] in the last 72 hours. Urinalysis:      Lab Results   Component Value Date    NITRU Negative 08/13/2020    WBCUA 1 08/13/2020    BACTERIA 1+ 07/20/2020    RBCUA 1 08/13/2020    BLOODU Negative 08/13/2020    SPECGRAV >1.030 08/13/2020    GLUCOSEU Negative 08/13/2020       Radiology:  CT ABDOMEN PELVIS W IV CONTRAST Additional Contrast? None   Final Result   No acute inflammatory abnormality is identified. No bowel obstruction. Normal appendix. Hepatic steatosis. Assessment/Plan:    Active Hospital Problems    Diagnosis    Essential hypertension [I10]    Elevated LFTs [R94.5]    Frequent hospital admissions [Z78.9]    Acute alcoholic gastritis without hemorrhage [K29.20]    Alcohol dependence with withdrawal ([facility] [address] [F10.239]    Seizure-like activity ([facility] [R56.9]     1. Acute gastritis. Secondary to ETOH abuse. Abdominal pain resolved. Transition to po Protonix. 2. Alcohol abuse/withdrawal. Continue supportive treatment including prn Ativan and scheduled librium- taper, multivitamin, thiamine and folic acid. 3. Elevated LFT. Likely secondary to ETOH abuse. No acute findings on CT scan. LFTs trending upward    Add IVF, check hepatitis panel and GI consult   4. Hypomagnesemia. Replaced, magnesium level 2.3 (8/15). 5. HTN. Reasonably controlled. Continue amlodipine 10 mg. Continue to follow. 6. Hx seizure. No seizure activity. Continue seizure and fall precautions, CIWA protocol. 7. Thrombocytopenia. Likely secondary ETOH abuse. Trending up, continue to monitor.  Trend labs        DVT Prophylaxis: lovenox   Diet: DIET GENERAL;  Code Status: Full Code    PT/OT Eval Status: na   CM consulted and given resources     Dispo - will allow GI to see if no further work up is needed he can DC to home     ADAM Rosenberg - CNP

## 2020-08-20 ENCOUNTER — APPOINTMENT (OUTPATIENT)
Dept: ULTRASOUND IMAGING | Age: 45
DRG: 241 | End: 2020-08-20
Payer: MEDICAID

## 2020-08-20 VITALS
HEART RATE: 73 BPM | TEMPERATURE: 98.1 F | OXYGEN SATURATION: 99 % | WEIGHT: 146.2 LBS | SYSTOLIC BLOOD PRESSURE: 136 MMHG | BODY MASS INDEX: 22.95 KG/M2 | RESPIRATION RATE: 16 BRPM | DIASTOLIC BLOOD PRESSURE: 92 MMHG | HEIGHT: 67 IN

## 2020-08-20 LAB
A/G RATIO: 1.2 (ref 1.1–2.2)
ALBUMIN SERPL-MCNC: 3.7 G/DL (ref 3.4–5)
ALP BLD-CCNC: 62 U/L (ref 40–129)
ALT SERPL-CCNC: 205 U/L (ref 10–40)
ANION GAP SERPL CALCULATED.3IONS-SCNC: 8 MMOL/L (ref 3–16)
AST SERPL-CCNC: 128 U/L (ref 15–37)
BILIRUB SERPL-MCNC: 0.3 MG/DL (ref 0–1)
BUN BLDV-MCNC: 8 MG/DL (ref 7–20)
CALCIUM SERPL-MCNC: 9.3 MG/DL (ref 8.3–10.6)
CHLORIDE BLD-SCNC: 106 MMOL/L (ref 99–110)
CO2: 27 MMOL/L (ref 21–32)
CREAT SERPL-MCNC: 0.6 MG/DL (ref 0.9–1.3)
GFR AFRICAN AMERICAN: >60
GFR NON-AFRICAN AMERICAN: >60
GLOBULIN: 3.2 G/DL
GLUCOSE BLD-MCNC: 87 MG/DL (ref 70–99)
GLUCOSE BLD-MCNC: 95 MG/DL (ref 70–99)
HAV IGM SER IA-ACNC: NORMAL
HBV SURFACE AB TITR SER: 33.15 MIU/ML
HCT VFR BLD CALC: 33.4 % (ref 40.5–52.5)
HEMOGLOBIN: 11.4 G/DL (ref 13.5–17.5)
HEPATITIS B CORE IGM ANTIBODY: NORMAL
HEPATITIS B SURFACE ANTIGEN INTERPRETATION: NORMAL
HEPATITIS C ANTIBODY INTERPRETATION: NORMAL
MCH RBC QN AUTO: 33.5 PG (ref 26–34)
MCHC RBC AUTO-ENTMCNC: 34.2 G/DL (ref 31–36)
MCV RBC AUTO: 98 FL (ref 80–100)
PDW BLD-RTO: 12.6 % (ref 12.4–15.4)
PERFORMED ON: NORMAL
PLATELET # BLD: 159 K/UL (ref 135–450)
PMV BLD AUTO: 7.7 FL (ref 5–10.5)
POTASSIUM SERPL-SCNC: 3.6 MMOL/L (ref 3.5–5.1)
RBC # BLD: 3.41 M/UL (ref 4.2–5.9)
SODIUM BLD-SCNC: 141 MMOL/L (ref 136–145)
TOTAL PROTEIN: 6.9 G/DL (ref 6.4–8.2)
WBC # BLD: 3.1 K/UL (ref 4–11)

## 2020-08-20 PROCEDURE — 6370000000 HC RX 637 (ALT 250 FOR IP): Performed by: NURSE PRACTITIONER

## 2020-08-20 PROCEDURE — 2580000003 HC RX 258: Performed by: NURSE PRACTITIONER

## 2020-08-20 PROCEDURE — 36415 COLL VENOUS BLD VENIPUNCTURE: CPT

## 2020-08-20 PROCEDURE — 6370000000 HC RX 637 (ALT 250 FOR IP): Performed by: INTERNAL MEDICINE

## 2020-08-20 PROCEDURE — 85027 COMPLETE CBC AUTOMATED: CPT

## 2020-08-20 PROCEDURE — 76705 ECHO EXAM OF ABDOMEN: CPT

## 2020-08-20 PROCEDURE — 80053 COMPREHEN METABOLIC PANEL: CPT

## 2020-08-20 PROCEDURE — 2580000003 HC RX 258: Performed by: INTERNAL MEDICINE

## 2020-08-20 RX ADMIN — CHLORDIAZEPOXIDE HYDROCHLORIDE 10 MG: 10 CAPSULE ORAL at 14:34

## 2020-08-20 RX ADMIN — Medication 10 ML: at 08:50

## 2020-08-20 RX ADMIN — FOLIC ACID 1 MG: 1 TABLET ORAL at 14:34

## 2020-08-20 RX ADMIN — AMLODIPINE BESYLATE 10 MG: 5 TABLET ORAL at 14:34

## 2020-08-20 RX ADMIN — SODIUM CHLORIDE: 9 INJECTION, SOLUTION INTRAVENOUS at 06:28

## 2020-08-20 RX ADMIN — Medication 10 ML: at 08:51

## 2020-08-20 RX ADMIN — MULTIVITAMIN TABLET 1 TABLET: TABLET at 14:36

## 2020-08-20 RX ADMIN — Medication 100 MG: at 14:34

## 2020-08-20 NOTE — PROGRESS NOTES
Comprehensive Nutrition Assessment    Type and Reason for Visit:  Initial(LOS assessment)    Nutrition Recommendations/Plan:   1. Offer Ensure Enlive BID  2. Encourage PO intake  3. Document all PO intake in flow sheets     Nutrition Assessment:  Pt assessed for nutrition risk. Pt with good PO intake on general diet, consuming % of most meals. Per GI note, pt has reported he typically doesn't eat well d/t lack of appetite. GI states pt has lost 13 lbs in the past 2 months, however I am unable to verify this in EMR. Per weight hx, pt's weight has been stable. Will offer Ensure Enlive BID as pt with hx ETOH abuse and nutrition has likely been inadequate. Pt deemed to be low risk at this time. Will continue to monitor for changes in status. Malnutrition Assessment:  Malnutrition Status:  No malnutrition      Nutrition Related Findings:  No edema noted      Wounds:  None       Current Nutrition Therapies:    DIET GENERAL; Anthropometric Measures:  · Height: 5' 7\" (170.2 cm)  · Current Body Weight: 146 lb (66.2 kg)   · Ideal Body Weight: 148 lbs   · BMI: 22.9  · BMI Categories: Normal Weight (BMI 18.5-24. 9)       Nutrition Diagnosis:   No nutrition diagnosis at this time     Nutrition Interventions:   Food and/or Nutrient Delivery:  Continue Current Diet, Start Oral Nutrition Supplement  Nutrition Education/Counseling:  Education not indicated   Coordination of Nutrition Care:  Continued Inpatient Monitoring    Goals:  Pt will continue to consume at least 50% of meals       Nutrition Monitoring and Evaluation:   Food/Nutrient Intake Outcomes:  Food and Nutrient Intake, Supplement Intake    Discharge Planning:    No discharge needs at this time     Electronically signed by Gabby Lundberg RD, LD on 8/20/20 at 1:05 PM EDT    Contact: [phone]

## 2020-08-20 NOTE — PROGRESS NOTES
Hospitalist Progress Note      PCP: No primary care provider on file. Date of Admission: 8/13/2020    Chief Complaint: ABd pain     Hospital Course: Patient is a 40 yo male with hx HTN, ETOH abuse. He presented with c/o abdominal pain associated with n/v and HA. ETOH level 389 on presentation. CT abdomen/pelvis unremarkable. He has had recurrent hospital admissions related to alcohol. Subjective: No acute events overnight. No complaints. Denies Hallucinations or tremors       Medications:  Reviewed    Infusion Medications    sodium chloride 100 mL/hr at 08/20/20 5127     Scheduled Medications    chlordiazePOXIDE  10 mg Oral Daily    pantoprazole  40 mg Oral QAM AC    folic acid  1 mg Oral Daily    multivitamin  1 tablet Oral Daily    sodium chloride flush  10 mL Intravenous 2 times per day    amLODIPine  10 mg Oral Daily    thiamine  100 mg Oral Daily    sodium chloride flush  10 mL Intravenous 2 times per day     PRN Meds: sodium chloride flush, acetaminophen **OR** acetaminophen, polyethylene glycol, promethazine **OR** ondansetron, LORazepam **OR** LORazepam **OR** LORazepam **OR** LORazepam **OR** LORazepam **OR** LORazepam **OR** LORazepam **OR** LORazepam      Intake/Output Summary (Last 24 hours) at 8/20/2020 1253  Last data filed at 8/19/2020 2105  Gross per 24 hour   Intake 250 ml   Output --   Net 250 ml       Physical Exam Performed:    /76   Pulse 83   Temp 98.1 °F (36.7 °C) (Oral)   Resp 16   Ht 5' 7\" (1.702 m)   Wt 146 lb 3.2 oz (66.3 kg)   SpO2 99%   BMI 22.90 kg/m²     General appearance: No apparent distress, appears stated age and cooperative. HEENT: Pupils equal, round, and reactive to light. Conjunctivae/corneas clear. Neck: Supple, with full range of motion. No jugular venous distention. Trachea midline. Respiratory:  Normal respiratory effort. Clear to auscultation, bilaterally without Rales/Wheezes/Rhonchi.   Cardiovascular: Regular rate and rhythm with normal S1/S2 without murmurs, rubs or gallops. Abdomen: Soft, non-tender, non-distended with normal bowel sounds. Musculoskeletal: No clubbing, cyanosis or edema bilaterally. Full range of motion without deformity. Skin: Skin color, texture, turgor normal.  No rashes or lesions. Neurologic:  Neurovascularly intact without any focal sensory/motor deficits. Cranial nerves: II-XII intact, grossly non-focal.  Psychiatric: Alert and oriented, thought content appropriate, normal insight  Capillary Refill: Brisk,< 3 seconds   Peripheral Pulses: +2 palpable, equal bilaterally       Labs:   Recent Labs     08/18/20  0440 08/19/20  0441 08/20/20  0440   WBC 2.8* 2.7* 3.1*   HGB 12.2* 12.0* 11.4*   HCT 35.5* 34.9* 33.4*   PLT 95* 121* 159     Recent Labs     08/18/20  0440 08/19/20  0441 08/20/20  0440    139 141   K 3.6 3.3* 3.6    103 106   CO2 27 26 27   BUN 8 8 8   CREATININE 0.7* 0.8* 0.6*   CALCIUM 9.2 9.0 9.3     Recent Labs     08/18/20  0440 08/19/20  0441 08/20/20  0440   * 158* 128*   * 192* 205*   BILITOT 0.3 0.3 0.3   ALKPHOS 78 68 62     No results for input(s): INR in the last 72 hours. No results for input(s): [employer] in the last 72 hours. Urinalysis:      Lab Results   Component Value Date    NITRU Negative 08/13/2020    WBCUA 1 08/13/2020    BACTERIA 1+ 07/20/2020    RBCUA 1 08/13/2020    BLOODU Negative 08/13/2020    SPECGRAV >1.030 08/13/2020    GLUCOSEU Negative 08/13/2020       Radiology:  CT ABDOMEN PELVIS W IV CONTRAST Additional Contrast? None   Final Result   No acute inflammatory abnormality is identified. No bowel obstruction. Normal appendix. Hepatic steatosis.          US GALLBLADDER RUQ    (Results Pending)           Assessment/Plan:    Active Hospital Problems    Diagnosis    Essential hypertension [I10]    Elevated LFTs [R94.5]    Frequent hospital admissions [Z78.9]    Acute alcoholic gastritis without hemorrhage [K29.20]   

## 2020-08-20 NOTE — PROGRESS NOTES
AB IgM. Likely c/w prior infection but will check Hepatitis B surface AB to be sure he has AB/immunity. Ferritin is high but normal iron sat which can be seen with alcohol abuse. - f/u Hep B surface AB  - f/u ceruloplasmin. Hemochromatosis gene test  - monitor liver enzymes  - f/u RUQ US  - will need outpatient follow up for the above and determination to be made at that time if liver biopsy is needed. H/o alcohol abuse - BAL elevated at admission but he denies EtOH use for 2 months. Repeat BAL negative. Ferritin is high but normal iron sat which can be seen with alcohol abuse. He reports he eats kenkey (dish made with fermented corn)  which contains alcohol.   - complete alcohol cessation and avoid food which contains alcohol    Addendum: RUQ US was normal, no gallstones or biliary dilation. Liver appeared normal as well. Pt ok for d/c from GI standpoint. rec f/u with Dr Babs Hurd in 2-4 weeks. Discussed with Dr. Jeremias Roberts, PA-[place]  [address]  I have personally performed a face to face diagnostic evaluation on this patient. I have interviewed and examined the patient and I agree with the findings and recommended plan of care. In summary, my findings and plan are the following: As above, doing well.  US (-). EtOH level normal.  Alcohol in blood likely from the [facility] and to avoid eating this or drinking alcohol. OK to discharge from GI standpoint and f/u with me in 2 - 4 weeks. I gave hmi my card. Will sign off, please call with questions.     Dany Messer MD  [address]  8/20/2020

## 2020-08-21 LAB — CERULOPLASMIN: 23 MG/DL (ref 15–30)

## 2020-09-06 ENCOUNTER — APPOINTMENT (OUTPATIENT)
Dept: CT IMAGING | Age: 45
End: 2020-09-06

## 2020-09-06 ENCOUNTER — HOSPITAL ENCOUNTER (EMERGENCY)
Age: 45
Discharge: HOME | End: 2020-09-06
Attending: EMERGENCY MEDICINE

## 2020-09-06 VITALS
HEART RATE: 95 BPM | SYSTOLIC BLOOD PRESSURE: 129 MMHG | TEMPERATURE: 97.8 F | RESPIRATION RATE: 16 BRPM | OXYGEN SATURATION: 98 % | HEIGHT: 67 IN | WEIGHT: 145 LBS | DIASTOLIC BLOOD PRESSURE: 96 MMHG | BODY MASS INDEX: 22.76 KG/M2

## 2020-09-06 LAB
A/G RATIO: 0.9 (ref 1.1–2.2)
ALBUMIN SERPL-MCNC: 3.7 G/DL (ref 3.4–5)
ALP BLD-CCNC: 88 U/L (ref 40–129)
ALT SERPL-CCNC: 62 U/L (ref 10–40)
ANION GAP SERPL CALCULATED.3IONS-SCNC: 13 MMOL/L (ref 3–16)
AST SERPL-CCNC: 107 U/L (ref 15–37)
BASOPHILS ABSOLUTE: 0 K/UL (ref 0–0.2)
BASOPHILS RELATIVE PERCENT: 0.3 %
BILIRUB SERPL-MCNC: 0.5 MG/DL (ref 0–1)
BILIRUBIN URINE: NEGATIVE
BLOOD, URINE: NEGATIVE
BUN BLDV-MCNC: 12 MG/DL (ref 7–20)
CALCIUM SERPL-MCNC: 8.7 MG/DL (ref 8.3–10.6)
CHLORIDE BLD-SCNC: 94 MMOL/L (ref 99–110)
CLARITY: CLEAR
CO2: 23 MMOL/L (ref 21–32)
COLOR: YELLOW
CREAT SERPL-MCNC: 0.7 MG/DL (ref 0.9–1.3)
EOSINOPHILS ABSOLUTE: 0 K/UL (ref 0–0.6)
EOSINOPHILS RELATIVE PERCENT: 1.6 %
ETHANOL: 210 MG/DL (ref 0–0.08)
GFR AFRICAN AMERICAN: >60
GFR NON-AFRICAN AMERICAN: >60
GLOBULIN: 4 G/DL
GLUCOSE BLD-MCNC: 104 MG/DL (ref 70–99)
GLUCOSE URINE: NEGATIVE MG/DL
HCT VFR BLD CALC: 40.3 % (ref 40.5–52.5)
HEMOGLOBIN: 13.6 G/DL (ref 13.5–17.5)
KETONES, URINE: NEGATIVE MG/DL
LEUKOCYTE ESTERASE, URINE: NEGATIVE
LIPASE: 38 U/L (ref 13–60)
LYMPHOCYTES ABSOLUTE: 1.5 K/UL (ref 1–5.1)
LYMPHOCYTES RELATIVE PERCENT: 48.2 %
MCH RBC QN AUTO: 33.1 PG (ref 26–34)
MCHC RBC AUTO-ENTMCNC: 33.7 G/DL (ref 31–36)
MCV RBC AUTO: 98.2 FL (ref 80–100)
MICROSCOPIC EXAMINATION: NORMAL
MONOCYTES ABSOLUTE: 0.2 K/UL (ref 0–1.3)
MONOCYTES RELATIVE PERCENT: 7.5 %
NEUTROPHILS ABSOLUTE: 1.3 K/UL (ref 1.7–7.7)
NEUTROPHILS RELATIVE PERCENT: 42.4 %
NITRITE, URINE: NEGATIVE
PDW BLD-RTO: 12.8 % (ref 12.4–15.4)
PH UA: 7.5 (ref 5–8)
PLATELET # BLD: 134 K/UL (ref 135–450)
PMV BLD AUTO: 7.4 FL (ref 5–10.5)
POTASSIUM SERPL-SCNC: 5.1 MMOL/L (ref 3.5–5.1)
PROTEIN UA: NEGATIVE MG/DL
RBC # BLD: 4.1 M/UL (ref 4.2–5.9)
SODIUM BLD-SCNC: 130 MMOL/L (ref 136–145)
SPECIFIC GRAVITY UA: 1.02 (ref 1–1.03)
TOTAL PROTEIN: 7.7 G/DL (ref 6.4–8.2)
URINE REFLEX TO CULTURE: NORMAL
URINE TYPE: NORMAL
UROBILINOGEN, URINE: 1 E.U./DL
WBC # BLD: 3 K/UL (ref 4–11)

## 2020-09-06 PROCEDURE — 85025 COMPLETE CBC W/AUTO DIFF WBC: CPT

## 2020-09-06 PROCEDURE — 96374 THER/PROPH/DIAG INJ IV PUSH: CPT

## 2020-09-06 PROCEDURE — 2580000003 HC RX 258: Performed by: EMERGENCY MEDICINE

## 2020-09-06 PROCEDURE — 74177 CT ABD & PELVIS W/CONTRAST: CPT

## 2020-09-06 PROCEDURE — 2500000003 HC RX 250 WO HCPCS: Performed by: EMERGENCY MEDICINE

## 2020-09-06 PROCEDURE — 99284 EMERGENCY DEPT VISIT MOD MDM: CPT

## 2020-09-06 PROCEDURE — 83690 ASSAY OF LIPASE: CPT

## 2020-09-06 PROCEDURE — 6360000002 HC RX W HCPCS: Performed by: EMERGENCY MEDICINE

## 2020-09-06 PROCEDURE — 6360000004 HC RX CONTRAST MEDICATION: Performed by: EMERGENCY MEDICINE

## 2020-09-06 PROCEDURE — 81003 URINALYSIS AUTO W/O SCOPE: CPT

## 2020-09-06 PROCEDURE — C9113 INJ PANTOPRAZOLE SODIUM, VIA: HCPCS | Performed by: EMERGENCY MEDICINE

## 2020-09-06 PROCEDURE — G0480 DRUG TEST DEF 1-7 CLASSES: HCPCS

## 2020-09-06 PROCEDURE — 6370000000 HC RX 637 (ALT 250 FOR IP): Performed by: EMERGENCY MEDICINE

## 2020-09-06 PROCEDURE — 36415 COLL VENOUS BLD VENIPUNCTURE: CPT

## 2020-09-06 PROCEDURE — 80053 COMPREHEN METABOLIC PANEL: CPT

## 2020-09-06 RX ORDER — PANTOPRAZOLE SODIUM 40 MG/10ML
80 INJECTION, POWDER, LYOPHILIZED, FOR SOLUTION INTRAVENOUS ONCE
Status: COMPLETED | OUTPATIENT
Start: 2020-09-06 | End: 2020-09-06

## 2020-09-06 RX ADMIN — IOPAMIDOL 75 ML: 755 INJECTION, SOLUTION INTRAVENOUS at 20:46

## 2020-09-06 RX ADMIN — FOLIC ACID: 5 INJECTION, SOLUTION INTRAMUSCULAR; INTRAVENOUS; SUBCUTANEOUS at 21:11

## 2020-09-06 RX ADMIN — PANTOPRAZOLE SODIUM 80 MG: 40 INJECTION, POWDER, FOR SOLUTION INTRAVENOUS at 21:05

## 2020-09-06 RX ADMIN — LIDOCAINE HYDROCHLORIDE: 20 SOLUTION ORAL; TOPICAL at 21:03

## 2020-09-06 ASSESSMENT — PAIN DESCRIPTION - LOCATION: LOCATION: ABDOMEN

## 2020-09-06 ASSESSMENT — PAIN DESCRIPTION - DESCRIPTORS: DESCRIPTORS: SHARP

## 2020-09-06 ASSESSMENT — PAIN DESCRIPTION - PAIN TYPE: TYPE: ACUTE PAIN

## 2020-09-06 ASSESSMENT — PAIN SCALES - GENERAL: PAINLEVEL_OUTOF10: 10

## 2020-09-07 NOTE — ED PROVIDER NOTES
[facility] Emergency Department    CHIEF COMPLAINT  Chief Complaint   Patient presents with    Abdominal Pain     and vomiting x today - getting worse. \"I've been here for this before\"        HISTORY OF PRESENT ILLNESS  Berneta Sacks is a 41 y.o. male  who presents to the ED complaining of nausea with vomiting and epigastric abdominal pain. He reports is been going on all day today. He denies any fevers. He adamantly denies any alcohol use. He specifically tells me it has been at least a few months. However I did talk to him about his admission in the middle of August where he had an alcohol level of nearly 400. He also specifically tells me he has not had any alcohol recently even when I told him about his alcohol level today being above 200. He tells me he is unsure how that is even possible. He does appear clinically intoxicated. He seems guarded and distant when speaking with me. He denies any chest pain or shortness of breath, no back pain, no fevers, no lower abdominal pain. No diarrhea. No bloody stools or bloody emesis. No other complaints, modifying factors or associated symptoms. I have reviewed the following from the nursing documentation.     Past Medical History:   Diagnosis Date    Alcohol abuse with physiological dependence ([address]     Essential hypertension 8/14/2020    HTN (hypertension)      Past Surgical History:   Procedure Laterality Date    UPPER GASTROINTESTINAL ENDOSCOPY N/A 6/12/2020    EGD BIOPSY performed by Ceci Kirk MD at [address] History   Family history unknown: Yes     Social History     Socioeconomic History    Marital status: Legally      Spouse name: Lul Hidalgo Number of children: Not on file    Years of education: Not on file    Highest education level: Not on file   Occupational History    Occupation: unemployed   Social Needs    Financial resource strain: Not on file    Food insecurity     Worry: Not on file     Inability: Not on file    Transportation needs     Medical: Not on file     Non-medical: Not on file   Tobacco Use    Smoking status: Former Smoker    Smokeless tobacco: Never Used    Tobacco comment: one a day    Substance and Sexual Activity    Alcohol use: Not Currently     Alcohol/week: 2.0 standard drinks     Types: 2 Cans of beer per week     Comment: socially    Drug use: Not Currently    Sexual activity: Not on file   Lifestyle    Physical activity     Days per week: Not on file     Minutes per session: Not on file    Stress: Not on file   Relationships    Social connections     Talks on phone: Not on file     Gets together: Not on file     Attends [employer] service: Not on file     Active member of club or organization: Not on file     Attends meetings of clubs or organizations: Not on file     Relationship status: Not on file    Intimate partner violence     Fear of current or ex partner: Not on file     Emotionally abused: Not on file     Physically abused: Not on file     Forced sexual activity: Not on file   Other Topics Concern    Not on file   Social History Narrative    Not on file     No current facility-administered medications for this encounter.       Current Outpatient Medications   Medication Sig Dispense Refill    amLODIPine (NORVASC) 10 MG tablet Take 1 tablet by mouth daily 30 tablet 3    folic acid (FOLVITE) 1 MG tablet Take 1 tablet by mouth daily 30 tablet 3    pantoprazole (PROTONIX) 40 MG tablet Take 1 tablet by mouth every morning (before breakfast) 30 tablet 3    vitamin B-1 100 MG tablet Take 1 tablet by mouth daily 30 tablet 3    Multiple Vitamin (MULTIVITAMIN) TABS tablet Take 1 tablet by mouth daily      ondansetron (ZOFRAN ODT) 4 MG disintegrating tablet Take 1 tablet by mouth every 8 hours as needed for Nausea 20 tablet 0     No Known Allergies    REVIEW OF SYSTEMS  10 systems reviewed, pertinent positives per HPI otherwise noted to be negative. PHYSICAL EXAM  /87   Pulse 79   Temp 97.8 °F (36.6 °C)   Resp 16   Ht 5' 7\" (1.702 m)   Wt 145 lb (65.8 kg)   SpO2 98%   BMI 22.71 kg/m²    GENERAL APPEARANCE: Awake and alert. Cooperative. No distress. HENT: Normocephalic. Atraumatic. Mucous membranes are dry. NECK: Supple. EYES: PERRL. EOM's grossly intact. HEART/CHEST: RRR. No murmurs. No chest wall tenderness. LUNGS: Respirations unlabored. CTAB. Good air exchange. Speaking comfortably in full sentences. ABDOMEN: Epigastric mild ttp, no RUQ or LUQ or lower abdominal ttp. Soft. Non-distended. No masses. No organomegaly. No guarding or rebound. Normal bowel sounds throughout. MUSCULOSKELETAL: No extremity edema. Compartments soft. No deformity. No tenderness in the extremities. All extremities neurovascularly intact. SKIN: Warm and dry. No acute rashes. NEUROLOGICAL: Alert and oriented. CN's 2-12 intact. No gross facial drooping. Strength 5/5, sensation intact. 2 plus DTR's in knees bilaterally. Gait normal.  PSYCHIATRIC: Normal mood and affect. LABS  I have reviewed all labs for this visit.    Results for orders placed or performed during the hospital encounter of 09/06/20   CBC Auto Differential   Result Value Ref Range    WBC 3.0 (L) 4.0 - 11.0 K/uL    RBC 4.10 (L) 4.20 - 5.90 M/uL    Hemoglobin 13.6 13.5 - 17.5 g/dL    Hematocrit 40.3 (L) 40.5 - 52.5 %    MCV 98.2 80.0 - 100.0 fL    MCH 33.1 26.0 - 34.0 pg    MCHC 33.7 31.0 - 36.0 g/dL    RDW 12.8 12.4 - 15.4 %    Platelets 963 (L) 012 - 450 K/uL    MPV 7.4 5.0 - 10.5 fL    Neutrophils % 42.4 %    Lymphocytes % 48.2 %    Monocytes % 7.5 %    Eosinophils % 1.6 %    Basophils % 0.3 %    Neutrophils Absolute 1.3 (L) 1.7 - 7.7 K/uL    Lymphocytes Absolute 1.5 1.0 - 5.1 K/uL    Monocytes Absolute 0.2 0.0 - 1.3 K/uL    Eosinophils Absolute 0.0 0.0 - 0.6 K/uL    Basophils Absolute 0.0 0.0 - 0.2 K/uL   Comprehensive metabolic panel   Result Value Ref Range    Sodium 130 (L) 136 - 145 mmol/L    Potassium 5.1 3.5 - 5.1 mmol/L    Chloride 94 (L) 99 - 110 mmol/L    CO2 23 21 - 32 mmol/L    Anion Gap 13 3 - 16    Glucose 104 (H) 70 - 99 mg/dL    BUN 12 7 - 20 mg/dL    CREATININE 0.7 (L) 0.9 - 1.3 mg/dL    GFR Non-African American >60 >60    GFR African American >60 >60    Calcium 8.7 8.3 - 10.6 mg/dL    Total Protein 7.7 6.4 - 8.2 g/dL    Alb 3.7 3.4 - 5.0 g/dL    Albumin/Globulin Ratio 0.9 (L) 1.1 - 2.2    Total Bilirubin 0.5 0.0 - 1.0 mg/dL    Alkaline Phosphatase 88 40 - 129 U/L    ALT 62 (H) 10 - 40 U/L     (H) 15 - 37 U/L    Globulin 4.0 g/dL   Lipase   Result Value Ref Range    Lipase 38.0 13.0 - 60.0 U/L   Urinalysis Reflex to Culture    Specimen: Urine, clean catch   Result Value Ref Range    Color, UA YELLOW Straw/Yellow    Clarity, UA Clear Clear    Glucose, Ur Negative Negative mg/dL    Bilirubin Urine Negative Negative    Ketones, Urine Negative Negative mg/dL    Specific Gravity, UA 1.021 1.005 - 1.030    Blood, Urine Negative Negative    pH, UA 7.5 5.0 - 8.0    Protein, UA Negative Negative mg/dL    Urobilinogen, Urine 1.0 <2.0 E.U./dL    Nitrite, Urine Negative Negative    Leukocyte Esterase, Urine Negative Negative    Microscopic Examination Not Indicated     Urine Type NotGiven     Urine Reflex to Culture Not Indicated    Ethanol   Result Value Ref Range    Ethanol Lvl 210 mg/dL       RADIOLOGY    Ct Abdomen Pelvis W Iv Contrast Additional Contrast? None    Result Date: 9/6/2020  EXAMINATION: CT OF THE ABDOMEN AND PELVIS WITH CONTRAST 9/6/2020 8:38 pm TECHNIQUE: CT of the abdomen and pelvis was performed with the administration of intravenous contrast. Multiplanar reformatted images are provided for review. Dose modulation, iterative reconstruction, and/or weight based adjustment of the mA/kV was utilized to reduce the radiation dose to as low as reasonably achievable.  COMPARISON: CT abdomen and pelvis with contrast 08/13/2020 and non-contrast CT abdomen/pelvis reduce the radiation dose to as low as reasonably achievable. COMPARISON: None. 06/09/2020 HISTORY: ORDERING SYSTEM PROVIDED HISTORY: abd pain TECHNOLOGIST PROVIDED HISTORY: Additional Contrast?->None Reason for exam:->abd pain Reason for Exam: Abd pain. Abdominal Pain (pt states he began having abd pain with n.v and headache that started this afternoon). Acuity: Acute Type of Exam: Initial FINDINGS: Lower Chest: The lung bases are clear. The visualized heart is unremarkable. Organs: There is moderate fatty infiltration of the liver. No gallbladder stones are seen. The pancreas, spleen and adrenal glands are unremarkable. The kidneys enhance symmetrically. There is no hydronephrosis. There is no suspicious renal abnormality. GI/Bowel: The stomach and small bowel are unremarkable. The appendix is normal.  There is no focal mural thickening of the colon appreciated. No pericolonic edema is visualized. The mesenteric vasculature enhances in normal fashion. Pelvis: The urinary bladder and prostate gland are unremarkable. No distal obstructing ureteral stone is seen. No pelvic mass is identified. Peritoneum/Retroperitoneum: There is no adenopathy or aneurysm. Bones/Soft Tissues: No acute osseous abnormality is identified. Soft tissues of the abdominal wall are unremarkable. No acute inflammatory abnormality is identified. No bowel obstruction. Normal appendix. Hepatic steatosis.      Us Gallbladder Ruq    Result Date: 8/20/2020  EXAMINATION: RIGHT UPPER QUADRANT ULTRASOUND 8/20/2020 12:56 pm COMPARISON: CT abdomen pelvis 08/13/2020 HISTORY: ORDERING SYSTEM PROVIDED HISTORY: elevated liver enzymes, alcohol abuse, eval for gallstones TECHNOLOGIST PROVIDED HISTORY: Reason for exam:->elevated liver enzymes, alcohol abuse, eval for gallstones Reason for Exam: abnormla labs Acuity: Unknown Type of Exam: Subsequent/Follow-up FINDINGS: LIVER:  The liver demonstrates normal echogenicity without evidence of intrahepatic biliary ductal dilatation. BILIARY SYSTEM:  Gallbladder is unremarkable without evidence of pericholecystic fluid, wall thickening or stones. Negative sonographic Martin's sign. Common bile duct is within normal limits measuring 5 mm. RIGHT KIDNEY: The right kidney is grossly unremarkable without evidence of hydronephrosis. PANCREAS:  Visualized portions of the pancreas are unremarkable. OTHER: No evidence of right upper quadrant ascites. Unremarkable right upper quadrant ultrasound. ED COURSE/MDM  Patient seen and evaluated. Old records reviewed. Labs and imaging reviewed and results discussed with patient. After initial evaluation, differential diagnostic considerations included: kidney stone, pyelonephritis, UTI, appendicitis, bowel obstruction, diverticulitis, hernia, gastritis/gastroenteritis, pancreatitis, cholecystitis, hepatitis, constipation, IBS, IBD    The patient's ED workup was notable for alcohol intoxication. His level is above 200 and he does appear intoxicated. He adamantly denies any alcohol use despite the objective data to the contrary. He says it may have been some kind of a dinner he had yesterday and this seems highly impossible to explain his alcohol level today. Additionally with his known alcohol use history I do not believe he has any condition where his etoh level would be autonomously elevated (e.g. auto-brewing). He was given a dose of Protonix here and a GI cocktail with a banana bag and some improvement of his symptoms. His CAT scan is unremarkable. Lipase is normal.  His other labs are also reassuring. I did discourage him from driving in his current state and will insist that he get a ride home given his alcohol level despite his lack of being forthcoming about it. He has to be admitted overnight but there is no indication for this currently. His vitals are normal.  Suspect gastritis. Suggested compliance with antacids and abstinence from etoh.     During the patient's ED course, the patient was given:  Medications   pantoprazole (PROTONIX) injection 80 mg (80 mg Intravenous Given 9/6/20 2105)   sodium chloride 0.9 % 1,722 mL with folic acid 1 mg, adult multi-vitamin with vitamin k 10 mL, thiamine 300 mg ( Intravenous New Bag 9/6/20 2111)   aluminum & magnesium hydroxide-simethicone (MAALOX) 30 mL, lidocaine viscous hcl (XYLOCAINE) 5 mL (GI COCKTAIL) ( Oral Given 9/6/20 2103)   iopamidol (ISOVUE-370) 76 % injection 75 mL (75 mLs Intravenous Given 9/6/20 2046)        CLINICAL IMPRESSION  1. Acute alcoholic intoxication without complication ([address]    2. Acute alcoholic gastritis without hemorrhage        Blood pressure 122/87, pulse 79, temperature 97.8 °F (36.6 °C), resp. rate 16, height 5' 7\" (1.702 m), weight 145 lb (65.8 kg), SpO2 98 %. DISPOSITION  Jemal Thibodeaux was discharged to home in stable condition. I have discussed the findings of today's workup with the patient and addressed the patient's questions and concerns. Important warning signs as well as new or worsening symptoms which would necessitate immediate return to the ED were discussed. The plan is to discharge from the ED at this time, and the patient is in stable condition. The patient acknowledged understanding is agreeable with this plan. Follow-up with:  [facility]  [address] [place] [address]  [phone]  Go to   If symptoms worsen      DISCLAIMER: This chart was created using Dragon dictation software. Efforts were made by me to ensure accuracy, however some errors may be present due to limitations of this technology and occasionally words are not transcribed correctly.        Stephan Reyna MD  09/06/20 4324

## 2023-02-02 ENCOUNTER — HOSPITAL ENCOUNTER (EMERGENCY)
Age: 48
Discharge: HOME | End: 2023-02-02
Attending: EMERGENCY MEDICINE

## 2023-02-02 VITALS
OXYGEN SATURATION: 98 % | SYSTOLIC BLOOD PRESSURE: 146 MMHG | RESPIRATION RATE: 20 BRPM | BODY MASS INDEX: 20.03 KG/M2 | TEMPERATURE: 98.3 F | DIASTOLIC BLOOD PRESSURE: 99 MMHG | WEIGHT: 127.87 LBS | HEART RATE: 85 BPM

## 2023-02-02 DIAGNOSIS — S01.511A LIP LACERATION, INITIAL ENCOUNTER: Primary | ICD-10-CM

## 2023-02-02 PROCEDURE — 99283 EMERGENCY DEPT VISIT LOW MDM: CPT

## 2023-02-02 NOTE — ED PROVIDER NOTES
[address] [facility]  eMERGENCY dEPARTMENT eNCOUnter        Pt Name: Nic Dillon  MRN: [id number]  [place] 1975  Date of evaluation: 2/2/2023  Provider: Ivana Dubose MD  PCP: No primary care provider on file. CHIEF COMPLAINT       Chief Complaint   Patient presents with    Lip Laceration     Pt. Was eating when he bite his lip and it will not stop bleeding       HISTORY OFPRESENT ILLNESS   (Location/Symptom, Timing/Onset, Context/Setting, Quality, Duration, Modifying Factors,Severity)  Note limiting factors. Nic Dillon is a 56 y.o. male patient bit his lip while he was eating and has a laceration on the lower lip from biting himself is been bleeding for an hour he is not on a blood thinner    Nursing Notes were all reviewed and agreed with or any disagreements were addressed  in the HPI. REVIEW OF SYSTEMS    (2-9 systems for level 4, 10 or more for level 5)       REVIEW OF SYSTEMS    Constitutional:  Denies fever, chills, or weakness   Eyes:  Denies vision changes  HENT:  Denies sore throat or neck pain   Respiratory:  Denies cough or shortness of breath   Cardiovascular:  Denies chest pain  GI:  Denies abdominal pain, nausea, vomiting, or diarrhea   Musculoskeletal:  Denies back pain   Skin: no rash or vesicles   Neurologic:  no headache weakness focal    Lymphatic:  no swollen  nodes     All systems negative except as marked. Positives and Pertinent negatives as per HPI. Except as noted above in the ROS, all other systems were reviewed andnegative.        PASTMEDICAL HISTORY     Past Medical History:   Diagnosis Date    Alcohol abuse with physiological dependence ([facility] [address]     Essential hypertension 8/14/2020    HTN (hypertension)          SURGICAL HISTORY       Past Surgical History:   Procedure Laterality Date    UPPER GASTROINTESTINAL ENDOSCOPY N/A 6/12/2020    EGD BIOPSY performed by Sam Becerra MD at [facility] Discharge Medication List as of 2/2/2023  4:22 AM        CONTINUE these medications which have NOT CHANGED    Details   amLODIPine (NORVASC) 10 MG tablet Take 1 tablet by mouth daily, Disp-30 tablet, [id number]      folic acid (FOLVITE) 1 MG tablet Take 1 tablet by mouth daily, Disp-30 tablet, R-3Normal      pantoprazole (PROTONIX) 40 MG tablet Take 1 tablet by mouth every morning (before breakfast), Disp-30 tablet, R-3Normal      vitamin B-1 100 MG tablet Take 1 tablet by mouth daily, Disp-30 tablet, R-3Normal      Multiple Vitamin (MULTIVITAMIN) TABS tablet Take 1 tablet by mouth dailyHistorical Med      ondansetron (ZOFRAN ODT) 4 MG disintegrating tablet Take 1 tablet by mouth every 8 hours as needed for Nausea, Disp-20 tablet, R-0Print             ALLERGIES     Patient has no known allergies.     FAMILY HISTORY       Family History   Family history unknown: Yes          SOCIAL HISTORY       Social History     Socioeconomic History    Marital status: Legally      Spouse name: Jemal Reilly    Number of children: None    Years of education: None    Highest education level: None   Occupational History    Occupation: unemployed   Tobacco Use    Smoking status: Former    Smokeless tobacco: Never    Tobacco comments:     one a day    Vaping Use    Vaping Use: Never used   Substance and Sexual Activity    Alcohol use: Not Currently     Alcohol/week: 2.0 standard drinks     Types: 2 Cans of beer per week     Comment: socially    Drug use: Not Currently       SCREENINGS    [place] Coma Scale  Eye Opening: Spontaneous  Best Verbal Response: Oriented  Best Motor Response: Obeys commands  [place] Coma Scale Score: 15        PHYSICAL EXAM    (up to 7 for level 4, 8 or more for level 5)     ED Triage Vitals [02/02/23 0403]   BP Temp Temp Source Heart Rate Resp SpO2 Height Weight   (!) 146/99 98.3 °F (36.8 °C) Oral 85 20 98 % -- 127 lb 13.9 oz (58 kg)           General Appearance:  Alert, cooperative, no distress, appears stated age. Head:  Normocephalic, without obvious abnormality, atraumatic. Eyes:  conjunctiva/corneas clear, EOM's intact. Sclera anicteric. ENT: Mucous membranes moist.   Neck: Supple, symmetrical, trachea midline, no adenopathy. No jugular venous distention. Lungs:   No Respiratory Distress. no rales  rhonchi rub   Chest Wall:  Nontender  no deformity   Heart:  Rsr no murmer gallop    Abdomen:   Soft nontender no organomegally    Extremities:  Full range of motion. no deformity   Pulses: Equal  upper and lower    Skin:  No rashes or lesions to exposed skin. Neurologic: Alert and oriented X 3. Motor grossly normal.  Speech clear. 5 mm laceration of the left lower lip which is bleeding    DIAGNOSTIC RESULTS   LABS:    Labs Reviewed - No data to display    All other labs were within normal range or not returned as of thisdictation. EKG: All EKG's are interpreted by the Emergency Department Physician who either signs or Co-signs this chart in the absence of a cardiologist.        RADIOLOGY:   Non-plain film images such as CT, Ultrasound and MRI are read by the radiologist. Phil Pankaj images are visualized and preliminarily interpreted by the  ED Provider with the belowfindings:        Interpretation per the Radiologist below, if available at the time of this note:    No orders to display         PROCEDURES   Unless otherwise noted below, none     Procedures PROCEDURE:  [address] [place] [address] or their surrogate had an opportunity to ask questions, and the risks, benefits, and alternatives were discussed. The wound was prepped and draped to maintain a sterile field. A local anesthetic was used to completely anesthetize the wound. 1% lidocaine with epinephrine it was copiously irrigated. It was explored   . No foreign bodies were identified. It was closed with 6-0 chromic x2 with good hemostasis. There were no complications during the procedure.      CRITICAL CARE TIME   N/A      CONSULTS:  None    EMERGENCY DEPARTMENT COURSE and DIFFERENTIAL DIAGNOSIS/MDM:   Vitals:    Vitals:    02/02/23 0403   BP: (!) 146/99   Pulse: 85   Resp: 20   Temp: 98.3 °F (36.8 °C)   TempSrc: Oral   SpO2: 98%   Weight: 127 lb 13.9 oz (58 kg)       Patient was given the following medications:  Medications - No data to display        Is this patient to be included in the SEP-1 Core Measure due to severe sepsis or septic shock?   No   Exclusion criteria - the patient is NOT to be included for SEP-1 Core Measure due to:  Infection is not suspected    Patient was given chromic closure and will dissolve on its own  The patient tolerated their visit well.   Thepatient and / or the family were informed of the results of any tests, a time was given to answer questions.    FINAL IMPRESSION      1. Lip laceration, initial encounter        DISPOSITION/PLAN   DISPOSITION Decision To Discharge 02/02/2023 04:18:12 AM      PATIENT REFERRED TO:  [facility]  [phone]        DISCHARGE MEDICATIONS:  Discharge Medication List as of 2/2/2023  4:22 AM          DISCONTINUED MEDICATIONS:  Discharge Medication List as of 2/2/2023  4:22 AM                 (Please note that portions of this note were completed with a voice recognition program.  Efforts were made to edit the dictations but occasionally words aremis-transcribed.)    Jorge A Varner MD (electronically signed)           Jorge A Varner MD  02/02/23 0439

## 2023-02-14 ENCOUNTER — HOSPITAL ENCOUNTER (INPATIENT)
Age: 48
LOS: 2 days | Discharge: HOME | DRG: 101 | End: 2023-02-16
Attending: EMERGENCY MEDICINE | Admitting: INTERNAL MEDICINE
Payer: MEDICAID

## 2023-02-14 ENCOUNTER — APPOINTMENT (OUTPATIENT)
Dept: GENERAL RADIOLOGY | Age: 48
DRG: 101 | End: 2023-02-14
Payer: MEDICAID

## 2023-02-14 ENCOUNTER — APPOINTMENT (OUTPATIENT)
Dept: CT IMAGING | Age: 48
DRG: 101 | End: 2023-02-14
Payer: MEDICAID

## 2023-02-14 DIAGNOSIS — R56.9 SEIZURE-LIKE ACTIVITY (HCC): Primary | ICD-10-CM

## 2023-02-14 DIAGNOSIS — Z78.9 ALCOHOL USE: ICD-10-CM

## 2023-02-14 LAB
A/G RATIO: 0.7 (ref 1.1–2.2)
ALBUMIN SERPL-MCNC: 3.5 G/DL (ref 3.4–5)
ALP BLD-CCNC: 245 U/L (ref 40–129)
ALT SERPL-CCNC: 92 U/L (ref 10–40)
ANION GAP SERPL CALCULATED.3IONS-SCNC: 10 MMOL/L (ref 3–16)
ANION GAP SERPL CALCULATED.3IONS-SCNC: 32 MMOL/L (ref 3–16)
AST SERPL-CCNC: 237 U/L (ref 15–37)
BASE EXCESS ARTERIAL: 3.9 MMOL/L (ref -3–3)
BASOPHILS ABSOLUTE: 0.1 K/UL (ref 0–0.2)
BASOPHILS RELATIVE PERCENT: 1.4 %
BILIRUB SERPL-MCNC: 0.8 MG/DL (ref 0–1)
BUN BLDV-MCNC: 10 MG/DL (ref 7–20)
BUN BLDV-MCNC: 14 MG/DL (ref 7–20)
CALCIUM SERPL-MCNC: 8.8 MG/DL (ref 8.3–10.6)
CALCIUM SERPL-MCNC: 9.1 MG/DL (ref 8.3–10.6)
CARBOXYHEMOGLOBIN ARTERIAL: 1.9 % (ref 0–1.5)
CHLORIDE BLD-SCNC: 89 MMOL/L (ref 99–110)
CHLORIDE BLD-SCNC: 93 MMOL/L (ref 99–110)
CO2: 10 MMOL/L (ref 21–32)
CO2: 27 MMOL/L (ref 21–32)
CREAT SERPL-MCNC: 0.6 MG/DL (ref 0.9–1.3)
CREAT SERPL-MCNC: 0.9 MG/DL (ref 0.9–1.3)
EKG ATRIAL RATE: 89 BPM
EKG DIAGNOSIS: NORMAL
EKG P AXIS: 26 DEGREES
EKG P-R INTERVAL: 118 MS
EKG Q-T INTERVAL: 396 MS
EKG QRS DURATION: 96 MS
EKG QTC CALCULATION (BAZETT): 481 MS
EKG R AXIS: 43 DEGREES
EKG T AXIS: 11 DEGREES
EKG VENTRICULAR RATE: 89 BPM
EOSINOPHILS ABSOLUTE: 0.1 K/UL (ref 0–0.6)
EOSINOPHILS RELATIVE PERCENT: 1.8 %
ETHANOL: NORMAL MG/DL (ref 0–0.08)
GFR SERPL CREATININE-BSD FRML MDRD: >60 ML/MIN/{1.73_M2}
GFR SERPL CREATININE-BSD FRML MDRD: >60 ML/MIN/{1.73_M2}
GLUCOSE BLD-MCNC: 116 MG/DL (ref 70–99)
GLUCOSE BLD-MCNC: 170 MG/DL (ref 70–99)
HCO3 ARTERIAL: 28.2 MMOL/L (ref 21–29)
HCT VFR BLD CALC: 37.4 % (ref 40.5–52.5)
HEMOGLOBIN, ART, EXTENDED: 12 G/DL (ref 13.5–17.5)
HEMOGLOBIN: 12 G/DL (ref 13.5–17.5)
LACTIC ACID: 13.8 MMOL/L (ref 0.4–2)
LACTIC ACID: 2.1 MMOL/L (ref 0.4–2)
LIPASE: 50 U/L (ref 13–60)
LYMPHOCYTES ABSOLUTE: 2.7 K/UL (ref 1–5.1)
LYMPHOCYTES RELATIVE PERCENT: 46.9 %
MAGNESIUM: 1.9 MG/DL (ref 1.8–2.4)
MAGNESIUM: 2.1 MG/DL (ref 1.8–2.4)
MCH RBC QN AUTO: 33.1 PG (ref 26–34)
MCHC RBC AUTO-ENTMCNC: 32.2 G/DL (ref 31–36)
MCV RBC AUTO: 102.9 FL (ref 80–100)
METHEMOGLOBIN ARTERIAL: 0.4 %
MONOCYTES ABSOLUTE: 0.6 K/UL (ref 0–1.3)
MONOCYTES RELATIVE PERCENT: 11.1 %
NEUTROPHILS ABSOLUTE: 2.2 K/UL (ref 1.7–7.7)
NEUTROPHILS RELATIVE PERCENT: 38.8 %
O2 SAT, ARTERIAL: 97.2 %
O2 THERAPY: ABNORMAL
PCO2 ARTERIAL: 40.3 MMHG (ref 35–45)
PDW BLD-RTO: 14.2 % (ref 12.4–15.4)
PH ARTERIAL: 7.45 (ref 7.35–7.45)
PLATELET # BLD: 111 K/UL (ref 135–450)
PMV BLD AUTO: 8.1 FL (ref 5–10.5)
PO2 ARTERIAL: 84.6 MMHG (ref 75–108)
POTASSIUM REFLEX MAGNESIUM: 3.2 MMOL/L (ref 3.5–5.1)
POTASSIUM REFLEX MAGNESIUM: 3.4 MMOL/L (ref 3.5–5.1)
RBC # BLD: 3.63 M/UL (ref 4.2–5.9)
SODIUM BLD-SCNC: 130 MMOL/L (ref 136–145)
SODIUM BLD-SCNC: 131 MMOL/L (ref 136–145)
TCO2 ARTERIAL: 65.9 MMOL/L
TOTAL PROTEIN: 8.5 G/DL (ref 6.4–8.2)
TROPONIN: <0.01 NG/ML
WBC # BLD: 5.7 K/UL (ref 4–11)

## 2023-02-14 PROCEDURE — 2580000003 HC RX 258: Performed by: PHYSICIAN ASSISTANT

## 2023-02-14 PROCEDURE — 36415 COLL VENOUS BLD VENIPUNCTURE: CPT

## 2023-02-14 PROCEDURE — 83690 ASSAY OF LIPASE: CPT

## 2023-02-14 PROCEDURE — 83735 ASSAY OF MAGNESIUM: CPT

## 2023-02-14 PROCEDURE — 84484 ASSAY OF TROPONIN QUANT: CPT

## 2023-02-14 PROCEDURE — 96361 HYDRATE IV INFUSION ADD-ON: CPT

## 2023-02-14 PROCEDURE — 2500000003 HC RX 250 WO HCPCS: Performed by: INTERNAL MEDICINE

## 2023-02-14 PROCEDURE — 36600 WITHDRAWAL OF ARTERIAL BLOOD: CPT

## 2023-02-14 PROCEDURE — 2580000003 HC RX 258: Performed by: INTERNAL MEDICINE

## 2023-02-14 PROCEDURE — 83605 ASSAY OF LACTIC ACID: CPT

## 2023-02-14 PROCEDURE — 85025 COMPLETE CBC W/AUTO DIFF WBC: CPT

## 2023-02-14 PROCEDURE — 96360 HYDRATION IV INFUSION INIT: CPT

## 2023-02-14 PROCEDURE — 82077 ASSAY SPEC XCP UR&BREATH IA: CPT

## 2023-02-14 PROCEDURE — 71045 X-RAY EXAM CHEST 1 VIEW: CPT

## 2023-02-14 PROCEDURE — 6360000002 HC RX W HCPCS: Performed by: INTERNAL MEDICINE

## 2023-02-14 PROCEDURE — 80053 COMPREHEN METABOLIC PANEL: CPT

## 2023-02-14 PROCEDURE — 70450 CT HEAD/BRAIN W/O DYE: CPT

## 2023-02-14 PROCEDURE — 93010 ELECTROCARDIOGRAM REPORT: CPT | Performed by: INTERNAL MEDICINE

## 2023-02-14 PROCEDURE — 82803 BLOOD GASES ANY COMBINATION: CPT

## 2023-02-14 PROCEDURE — 84443 ASSAY THYROID STIM HORMONE: CPT

## 2023-02-14 PROCEDURE — 6370000000 HC RX 637 (ALT 250 FOR IP): Performed by: INTERNAL MEDICINE

## 2023-02-14 PROCEDURE — 2060000000 HC ICU INTERMEDIATE R&B

## 2023-02-14 PROCEDURE — 93005 ELECTROCARDIOGRAM TRACING: CPT | Performed by: PHYSICIAN ASSISTANT

## 2023-02-14 PROCEDURE — 99285 EMERGENCY DEPT VISIT HI MDM: CPT

## 2023-02-14 PROCEDURE — 6370000000 HC RX 637 (ALT 250 FOR IP): Performed by: PHYSICIAN ASSISTANT

## 2023-02-14 RX ORDER — ACETAMINOPHEN 650 MG/1
650 SUPPOSITORY RECTAL EVERY 6 HOURS PRN
Status: DISCONTINUED | OUTPATIENT
Start: 2023-02-14 | End: 2023-02-16 | Stop reason: HOSPADM

## 2023-02-14 RX ORDER — SODIUM CHLORIDE AND POTASSIUM CHLORIDE 150; 900 MG/100ML; MG/100ML
INJECTION, SOLUTION INTRAVENOUS CONTINUOUS
Status: DISCONTINUED | OUTPATIENT
Start: 2023-02-14 | End: 2023-02-16 | Stop reason: HOSPADM

## 2023-02-14 RX ORDER — SODIUM CHLORIDE 0.9 % (FLUSH) 0.9 %
10 SYRINGE (ML) INJECTION EVERY 12 HOURS SCHEDULED
Status: DISCONTINUED | OUTPATIENT
Start: 2023-02-14 | End: 2023-02-16 | Stop reason: HOSPADM

## 2023-02-14 RX ORDER — LORAZEPAM 1 MG/1
4 TABLET ORAL
Status: DISCONTINUED | OUTPATIENT
Start: 2023-02-14 | End: 2023-02-16 | Stop reason: HOSPADM

## 2023-02-14 RX ORDER — SODIUM CHLORIDE 9 MG/ML
INJECTION, SOLUTION INTRAVENOUS PRN
Status: DISCONTINUED | OUTPATIENT
Start: 2023-02-14 | End: 2023-02-16 | Stop reason: HOSPADM

## 2023-02-14 RX ORDER — LORAZEPAM 2 MG/ML
2 INJECTION INTRAMUSCULAR
Status: DISCONTINUED | OUTPATIENT
Start: 2023-02-14 | End: 2023-02-16 | Stop reason: HOSPADM

## 2023-02-14 RX ORDER — M-VIT,TX,IRON,MINS/CALC/FOLIC 27MG-0.4MG
1 TABLET ORAL DAILY
Status: DISCONTINUED | OUTPATIENT
Start: 2023-02-14 | End: 2023-02-16 | Stop reason: HOSPADM

## 2023-02-14 RX ORDER — LEVETIRACETAM 500 MG/1
500 TABLET ORAL 2 TIMES DAILY
Status: DISCONTINUED | OUTPATIENT
Start: 2023-02-14 | End: 2023-02-16 | Stop reason: HOSPADM

## 2023-02-14 RX ORDER — ONDANSETRON 4 MG/1
4 TABLET, ORALLY DISINTEGRATING ORAL EVERY 8 HOURS PRN
Status: DISCONTINUED | OUTPATIENT
Start: 2023-02-14 | End: 2023-02-16 | Stop reason: HOSPADM

## 2023-02-14 RX ORDER — GAUZE BANDAGE 2" X 2"
100 BANDAGE TOPICAL DAILY
Status: DISCONTINUED | OUTPATIENT
Start: 2023-02-14 | End: 2023-02-14 | Stop reason: SDUPTHER

## 2023-02-14 RX ORDER — ONDANSETRON 2 MG/ML
4 INJECTION INTRAMUSCULAR; INTRAVENOUS EVERY 6 HOURS PRN
Status: DISCONTINUED | OUTPATIENT
Start: 2023-02-14 | End: 2023-02-16 | Stop reason: HOSPADM

## 2023-02-14 RX ORDER — SODIUM CHLORIDE, SODIUM LACTATE, POTASSIUM CHLORIDE, AND CALCIUM CHLORIDE .6; .31; .03; .02 G/100ML; G/100ML; G/100ML; G/100ML
1000 INJECTION, SOLUTION INTRAVENOUS ONCE
Status: COMPLETED | OUTPATIENT
Start: 2023-02-14 | End: 2023-02-14

## 2023-02-14 RX ORDER — LORAZEPAM 2 MG/ML
1 INJECTION INTRAMUSCULAR
Status: DISCONTINUED | OUTPATIENT
Start: 2023-02-14 | End: 2023-02-16 | Stop reason: HOSPADM

## 2023-02-14 RX ORDER — POLYETHYLENE GLYCOL 3350 17 G/17G
17 POWDER, FOR SOLUTION ORAL DAILY PRN
Status: DISCONTINUED | OUTPATIENT
Start: 2023-02-14 | End: 2023-02-16 | Stop reason: HOSPADM

## 2023-02-14 RX ORDER — LORAZEPAM 1 MG/1
3 TABLET ORAL
Status: DISCONTINUED | OUTPATIENT
Start: 2023-02-14 | End: 2023-02-16 | Stop reason: HOSPADM

## 2023-02-14 RX ORDER — SODIUM CHLORIDE 0.9 % (FLUSH) 0.9 %
10 SYRINGE (ML) INJECTION PRN
Status: DISCONTINUED | OUTPATIENT
Start: 2023-02-14 | End: 2023-02-16 | Stop reason: HOSPADM

## 2023-02-14 RX ORDER — GAUZE BANDAGE 2" X 2"
100 BANDAGE TOPICAL DAILY
Status: DISCONTINUED | OUTPATIENT
Start: 2023-02-15 | End: 2023-02-16 | Stop reason: HOSPADM

## 2023-02-14 RX ORDER — LORAZEPAM 2 MG/ML
3 INJECTION INTRAMUSCULAR
Status: DISCONTINUED | OUTPATIENT
Start: 2023-02-14 | End: 2023-02-16 | Stop reason: HOSPADM

## 2023-02-14 RX ORDER — 0.9 % SODIUM CHLORIDE 0.9 %
1000 INTRAVENOUS SOLUTION INTRAVENOUS ONCE
Status: COMPLETED | OUTPATIENT
Start: 2023-02-14 | End: 2023-02-14

## 2023-02-14 RX ORDER — ACETAMINOPHEN 325 MG/1
650 TABLET ORAL EVERY 6 HOURS PRN
Status: DISCONTINUED | OUTPATIENT
Start: 2023-02-14 | End: 2023-02-16 | Stop reason: HOSPADM

## 2023-02-14 RX ORDER — ENOXAPARIN SODIUM 100 MG/ML
40 INJECTION SUBCUTANEOUS DAILY
Status: DISCONTINUED | OUTPATIENT
Start: 2023-02-14 | End: 2023-02-16 | Stop reason: HOSPADM

## 2023-02-14 RX ORDER — LORAZEPAM 1 MG/1
1 TABLET ORAL
Status: DISCONTINUED | OUTPATIENT
Start: 2023-02-14 | End: 2023-02-16 | Stop reason: HOSPADM

## 2023-02-14 RX ORDER — LORAZEPAM 2 MG/ML
4 INJECTION INTRAMUSCULAR
Status: DISCONTINUED | OUTPATIENT
Start: 2023-02-14 | End: 2023-02-16 | Stop reason: HOSPADM

## 2023-02-14 RX ORDER — LORAZEPAM 1 MG/1
2 TABLET ORAL
Status: DISCONTINUED | OUTPATIENT
Start: 2023-02-14 | End: 2023-02-16 | Stop reason: HOSPADM

## 2023-02-14 RX ORDER — MAGNESIUM SULFATE IN WATER 40 MG/ML
2000 INJECTION, SOLUTION INTRAVENOUS ONCE
Status: COMPLETED | OUTPATIENT
Start: 2023-02-14 | End: 2023-02-14

## 2023-02-14 RX ADMIN — POTASSIUM CHLORIDE AND SODIUM CHLORIDE: 900; 150 INJECTION, SOLUTION INTRAVENOUS at 21:10

## 2023-02-14 RX ADMIN — POTASSIUM BICARBONATE 40 MEQ: 782 TABLET, EFFERVESCENT ORAL at 19:02

## 2023-02-14 RX ADMIN — LEVETIRACETAM 500 MG: 500 TABLET, FILM COATED ORAL at 20:53

## 2023-02-14 RX ADMIN — SODIUM CHLORIDE, POTASSIUM CHLORIDE, SODIUM LACTATE AND CALCIUM CHLORIDE 1000 ML: 600; 310; 30; 20 INJECTION, SOLUTION INTRAVENOUS at 12:19

## 2023-02-14 RX ADMIN — MAGNESIUM SULFATE HEPTAHYDRATE 2000 MG: 40 INJECTION, SOLUTION INTRAVENOUS at 19:04

## 2023-02-14 RX ADMIN — THIAMINE HCL TAB 100 MG 100 MG: 100 TAB at 12:15

## 2023-02-14 RX ADMIN — Medication 1 TABLET: at 12:15

## 2023-02-14 RX ADMIN — SODIUM CHLORIDE 1000 ML: 9 INJECTION, SOLUTION INTRAVENOUS at 19:01

## 2023-02-14 RX ADMIN — ENOXAPARIN SODIUM 40 MG: 100 INJECTION SUBCUTANEOUS at 19:02

## 2023-02-14 RX ADMIN — FOLIC ACID: 5 INJECTION, SOLUTION INTRAMUSCULAR; INTRAVENOUS; SUBCUTANEOUS at 21:02

## 2023-02-14 ASSESSMENT — LIFESTYLE VARIABLES
HOW OFTEN DO YOU HAVE A DRINK CONTAINING ALCOHOL: NEVER
HOW OFTEN DO YOU HAVE A DRINK CONTAINING ALCOHOL: 2-3 TIMES A WEEK

## 2023-02-14 NOTE — ED NOTES
18 gauge PIV in R AC placed by EMS infiltrated, fluids stopped at this time. New 22 gauge PIV placed in L FA, fluids restarted. Seizure pads in place, family at bedside.         Isrrael Ramos RN  02/14/23 [address], RN  02/14/23 1099

## 2023-02-14 NOTE — ED NOTES
Family at bedside, states patient is daily alcohol drinker, unknown when patient's last drink was or what it was. Has no known seizure history. Seizure pads in place.       Jag Cabrera RN  02/14/23 5918

## 2023-02-14 NOTE — ED PROVIDER NOTES
This patient was seen by the Mid-Level Provider. I have seen and examined the patient, agree with the workup, evaluation, management and diagnosis. Care plan has been discussed. My assessment reveals a 78-year-old male presents with a seizure. This is a 78-year-old male who apparently had a seizure around 8 or 9:00 this morning. Patient has a history of alcohol abuse and initially we were told he was not drinking but the patient does admit to drinking over the last week. The patient has apparently had seizures in the past per a friend in the room. Patient denies any other complaints. Radiology results:    XR CHEST PORTABLE   Final Result   No acute disease         CT HEAD WO CONTRAST   Preliminary Result   No acute intracranial abnormality. LABS:    Labs Reviewed   CBC WITH AUTO DIFFERENTIAL - Abnormal; Notable for the following components:       Result Value    RBC 3.63 (*)     Hemoglobin 12.0 (*)     Hematocrit 37.4 (*)     .9 (*)     Platelets 536 (*)     All other components within normal limits   COMPREHENSIVE METABOLIC PANEL W/ REFLEX TO MG FOR LOW K - Abnormal; Notable for the following components:    Sodium 131 (*)     Potassium reflex Magnesium 3.4 (*)     Chloride 89 (*)     CO2 10 (*)     Anion Gap 32 (*)     Glucose 170 (*)     Total Protein 8.5 (*)     Albumin/Globulin Ratio 0.7 (*)     Alkaline Phosphatase 245 (*)     ALT 92 (*)      (*)     All other components within normal limits    Narrative:     CALL  [facility] tel. [phone],  Chemistry results called to and read back by [facility] reyes,rn, 02/14/2023 12:02,  by Devyn Clark   LACTIC ACID - Abnormal; Notable for the following components:    Lactic Acid 13.8 (*)     All other components within normal limits    Narrative:     Christina Richard  [facility] tel. [phone],  Chemistry results called to and read back by [facility] reyes,rn, 02/14/2023 11:49,  by Brien Henriquez    Narrative:     CALL  [facility] tel. [phone],  Chemistry results called to and read back by [facility] reyes,rn, 2023 12:02,  by Mario Saul   TROPONIN    Narrative:     Ayo Marquez  [facility] tel. [phone],  Chemistry results called to and read back by [facility] reyes,rn, 2023 12:02,  by David Del Rio    Narrative:     Ummha Jimmy  [facility] tel. [phone],  Chemistry results called to and read back by [facility] reyes,rn, 2023 12:02,  by Mario Saul   MAGNESIUM    Narrative:     Ayo Jimmy  [facility] tel. [phone],  Chemistry results called to and read back by [facility] reyes,rn, 2023 12:02,  by Mario Saul           EKG:    Sinus rhythm at a rate of 89 beats a minute with no acute ST elevations or depressions or pathologic Q waves. Normal axis. Exam:    Well-nourished male in no acute distress. The patient had no focal motor 60 deficits throughout. There were no signs of any head injury noted. Medical decision [id number] male, alcoholic who presents with a probable seizure. The patient's work-up today shows a low bicarb and elevated lactic acid consistent with a possible seizure. Patient has received some IV fluid along with some vitamins per IV. He is currently neurologically intact and stable. The patient be admitted for probable alcohol withdrawal seizures versus idiopathic seizures. He is in stable condition. Critical care time: 45 minutes of critical care time spent with this patient. FINAL IMPRESSION:    1.  Seizure [facility])           Renay Kent MD  23 [phone]

## 2023-02-14 NOTE — ED NOTES
Bedside report give to Michael Rivera. RN to transport patient to unit.      Jag Cabrera RN  02/14/23 0749

## 2023-02-14 NOTE — ED PROVIDER NOTES
[facility]        Pt Name: Julio C Bloom  MRN: [id number]  [place] 1975  Date of evaluation: 2/14/2023  Provider: Juan Quiles PA-C  PCP: No primary care provider on file. Note Started: 12:32 PM EST 2/14/23       I have seen and evaluated this patient with my supervising physician Colette Ferrell I personally saw the patient and independently provided 33 minutes of non-concurrent critical care time out of the total critical care time provided. This excludes time spent doing separately billable procedures. This includes time at the bedside, data interpretation, medication management, obtaining critical history from collateral sources if the patient is unable to provide it directly, and physician consultation. Specifics of interventions taken and potentially life-threatening diagnostic considerations are listed above in the medical decision making. CHIEF COMPLAINT       Chief Complaint   Patient presents with    Seizures     Arrived per [facility] from home d/t possible seizure like activity; EMS found unresponsive; VSS;  without a known hx of DM; 18 gauge IV RAC         HISTORY OF PRESENT ILLNESS: 1 or more Elements     History From: patient and sister  Limitations to history : Language Twi and Behavior    Julio C Bloom is a 56 y.o. male who presents to the emergency department after possible seizure-like activity. This was witnessed by his sister is at bedside. There is a little language barrier and patient does speak some mild [place]. He however is barely talking or giving any answers at baseline. When he does not understand is translated by his sister at bedside with Twi language. Sister states he is supposed to be on folic acid but has not started this and is not supposed be on any other medication. Sister initially told nursing staff that he has not drank in multiple months but patient does state that he drank 1 beer last week. Denies any history of seizure. Patient is complaining some abdominal discomfort. Denies difficulty moving his extremities. States he just feels shaky. Denies loss of bowel or bladder function. Nursing Notes were all reviewed and agreed with or any disagreements were addressed in the HPI. REVIEW OF SYSTEMS :      Review of Systems    Positives and Pertinent negatives as per HPI. SURGICAL HISTORY     Past Surgical History:   Procedure Laterality Date    UPPER GASTROINTESTINAL ENDOSCOPY N/A 6/12/2020    EGD BIOPSY performed by Maria Del Carmen Mcgowan MD at [address] [place] [address]       Current Discharge Medication List        CONTINUE these medications which have NOT CHANGED    Details   amLODIPine (NORVASC) 10 MG tablet Take 1 tablet by mouth daily  Qty: 30 tablet, Refills: 3      folic acid (FOLVITE) 1 MG tablet Take 1 tablet by mouth daily  Qty: 30 tablet, Refills: 3      pantoprazole (PROTONIX) 40 MG tablet Take 1 tablet by mouth every morning (before breakfast)  Qty: 30 tablet, Refills: 3      vitamin B-1 100 MG tablet Take 1 tablet by mouth daily  Qty: 30 tablet, Refills: 3      Multiple Vitamin (MULTIVITAMIN) TABS tablet Take 1 tablet by mouth daily      ondansetron (ZOFRAN ODT) 4 MG disintegrating tablet Take 1 tablet by mouth every 8 hours as needed for Nausea  Qty: 20 tablet, Refills: 0             ALLERGIES     Patient has no known allergies.     FAMILYHISTORY       Family History   Family history unknown: Yes        SOCIAL HISTORY       Social History     Tobacco Use    Smoking status: Former    Smokeless tobacco: Never    Tobacco comments:     one a day    Vaping Use    Vaping Use: Never used   Substance Use Topics    Alcohol use: Not Currently     Alcohol/week: 2.0 standard drinks     Types: 2 Cans of beer per week     Comment: socially    Drug use: Not Currently       SCREENINGS        Los Coma Scale  Eye Opening: Spontaneous  Best Verbal Response: Confused  Best Motor Response: Obeys commands  [place] Coma Scale Score: 14                CIWA Assessment  BP: (!) 165/97  Heart Rate: 94  Nausea and Vomiting: no nausea and no vomiting  Tactile Disturbances: none  Tremor: 2  Auditory Disturbances: not present  Paroxysmal Sweats: no sweat visible  Visual Disturbances: not present  Anxiety: mildly anxious  Headache, Fullness in Head: none present  Agitation: normal activity  Orientation and Clouding of Sensorium: oriented and can do serial additions  CIWA-Ar Total: 3           PHYSICAL EXAM  1 or more Elements     ED Triage Vitals [02/14/23 1105]   BP Temp Temp Source Heart Rate Resp SpO2 Height Weight   (!) 137/100 97.8 °F (36.6 °C) Axillary 96 18 98 % -- --       Physical Exam  Vitals and nursing note reviewed. Constitutional:       Appearance: He is well-developed. He is not diaphoretic. Comments: Patient appears disoriented. HENT:      Head: Atraumatic. Nose: Nose normal.      Mouth/Throat:      Pharynx: No oropharyngeal exudate or posterior oropharyngeal erythema. Comments: There does appear to be some bruising and abrasion of the tip of the tongue from a possible tongue bite. No bleeding. Eyes:      General:         Right eye: No discharge. Left eye: No discharge. Cardiovascular:      Rate and Rhythm: Normal rate and regular rhythm. Heart sounds: No murmur heard. No friction rub. No gallop. Pulmonary:      Effort: Pulmonary effort is normal. No respiratory distress. Breath sounds: No stridor. No wheezing, rhonchi or rales. Abdominal:      General: Bowel sounds are normal. There is no distension. Palpations: Abdomen is soft. There is no mass. Tenderness: There is no abdominal tenderness. There is no guarding or rebound. Hernia: No hernia is present. Musculoskeletal:         General: No swelling. Normal range of motion. Cervical back: Normal range of motion. Skin:     General: Skin is warm and dry. Findings: No erythema or rash. Neurological:      General: No focal deficit present. Mental Status: He is alert. He is disoriented. Cranial Nerves: No cranial nerve deficit.    Psychiatric:         Behavior: Behavior normal.           DIAGNOSTIC RESULTS   LABS:    Labs Reviewed   CBC WITH AUTO DIFFERENTIAL - Abnormal; Notable for the following components:       Result Value    RBC 3.63 (*)     Hemoglobin 12.0 (*)     Hematocrit 37.4 (*)     .9 (*)     Platelets 779 (*)     All other components within normal limits   COMPREHENSIVE METABOLIC PANEL W/ REFLEX TO MG FOR LOW K - Abnormal; Notable for the following components:    Sodium 131 (*)     Potassium reflex Magnesium 3.4 (*)     Chloride 89 (*)     CO2 10 (*)     Anion Gap 32 (*)     Glucose 170 (*)     Total Protein 8.5 (*)     Albumin/Globulin Ratio 0.7 (*)     Alkaline Phosphatase 245 (*)     ALT 92 (*)      (*)     All other components within normal limits    Narrative:     CALL  [facility] tel. [phone],  Chemistry results called to and read back by Netta Nakai reyes,rn, 02/14/2023 12:02,  by Mathieu De Leon   LACTIC ACID - Abnormal; Notable for the following components:    Lactic Acid 13.8 (*)     All other components within normal limits    Narrative:     CALL  [facility] tel. [phone],  Chemistry results called to and read back by Netta Nakai reyes,rn, 02/14/2023 11:49,  by Mathieu De Leon   LACTIC ACID - Abnormal; Notable for the following components:    Lactic Acid 2.1 (*)     All other components within normal limits   BLOOD GAS, ARTERIAL - Abnormal; Notable for the following components:    pH, Arterial 7.452 (*)     Base Excess, Arterial 3.9 (*)     Hemoglobin, Art, Extended 12.0 (*)     Carboxyhgb, Arterial 1.9 (*)     All other components within normal limits   BASIC METABOLIC PANEL W/ REFLEX TO MG FOR LOW K - Abnormal; Notable for the following components:    Sodium 130 (*)     Potassium reflex Magnesium 3.2 (*)     Chloride 93 (*)     Glucose 116 (*) Creatinine 0.6 (*)     All other components within normal limits   LIPASE    Narrative:     CALL  Smith  [facility] tel. [phone],  Chemistry results called to and read back by [facility] reyes,rn, 02/14/2023 12:02,  by [facility]   TROPONIN    Narrative:     Power Patel  [facility] tel. [phone],  Chemistry results called to and read back by [facility] reyes,rn, 02/14/2023 12:02,  by Jennifer Reyes    Narrative:     Power Patel  [facility] tel. [phone],  Chemistry results called to and read back by [facility] reyes,rn, 02/14/2023 12:02,  by Ekta Coy    Narrative:     Power Patel  [facility] tel. [phone],  Chemistry results called to and read back by [facility] reyes,rn, 02/14/2023 12:02,  by [facility]   MAGNESIUM   TSH WITH REFLEX   BASIC METABOLIC PANEL W/ REFLEX TO MG FOR LOW K   LACTIC ACID       When ordered only abnormal lab results are displayed. All other labs were within normal range or not returned as of this dictation. EKG: When ordered, EKG's are interpreted by the Emergency Department Physician in the absence of a cardiologist.  Please see their note for interpretation of EKG. RADIOLOGY:   Non-plain film images such as CT, Ultrasound and MRI are read by the radiologist. Plain radiographic images are visualized and preliminarily interpreted by the ED Provider with the below findings:        Interpretation per the Radiologist below, if available at the time of this note:    XR CHEST PORTABLE   Final Result   No acute disease         CT HEAD WO CONTRAST   Preliminary Result   No acute intracranial abnormality.          MRI BRAIN W WO CONTRAST    (Results Pending)     CT HEAD WO CONTRAST    Result Date: 2/14/2023  EXAMINATION: CT OF THE HEAD WITHOUT CONTRAST  2/14/2023 11:38 am TECHNIQUE: CT of the head was performed without the administration of intravenous contrast. Automated exposure control, iterative reconstruction, and/or weight based adjustment of the mA/kV was utilized to reduce the radiation dose to as low as reasonably achievable. COMPARISON: 07/20/2020 HISTORY: ORDERING SYSTEM PROVIDED HISTORY: Seizure-like activity TECHNOLOGIST PROVIDED HISTORY: If patient is on cardiac monitor and/or pulse ox, they may be taken off cardiac monitor and pulse ox, left on O2 if currently on. All monitors reattached when patient returns to room. Has a \"code stroke\" or \"stroke alert\" been called? ->No Reason for exam:->Seizure-like activity Decision Support Exception - unselect if not a suspected or confirmed emergency medical condition->Emergency Medical Condition (MA) Reason for Exam: Seizure-like activity FINDINGS: BRAIN/VENTRICLES: There is no acute intracranial hemorrhage, mass effect or midline shift. No abnormal extra-axial fluid collection. The gray-white differentiation is maintained without evidence of an acute infarct. There is no evidence of hydrocephalus. There are chronic lacunar infarcts within the left basal ganglia. There are stable mild scattered foci of subcortical and periventricular white matter hypoattenuation, most consistent with chronic small vessel ischemic white matter disease. No focus of acute abnormal brain attenuation is identified. ORBITS: The visualized portion of the orbits demonstrate no acute abnormality. SINUSES: There is mild-to-moderate mucosal thickening affecting the bilateral ethmoid and sphenoid sinuses. The remainder of the paranasal sinuses are patent and clear. The mastoids are clear bilaterally. SOFT TISSUES/SKULL:  No acute abnormality of the visualized skull or soft tissues. No acute intracranial abnormality.      XR CHEST PORTABLE    Result Date: 2/14/2023  EXAMINATION: ONE XRAY VIEW OF THE CHEST 2/14/2023 11:54 am COMPARISON: July 2020 HISTORY: ORDERING SYSTEM PROVIDED HISTORY: Seizure-like activity TECHNOLOGIST PROVIDED HISTORY: Reason for exam:->Seizure-like activity Reason for Exam: Seizure-like activity FINDINGS: Heart size is normal.  Mediastinal contours are normal.  Pulmonary vascularity is normal.  No focal lung consolidation is noted Gaseous distention is seen in the upper abdomen     No acute disease       No results found. PROCEDURES   Unless otherwise noted below, none     Procedures    CRITICAL CARE TIME (.cctime)       PAST MEDICAL HISTORY      has a past medical history of Alcohol abuse with physiological dependence ([facility] [address] Essential hypertension (08/14/2020), and HTN (hypertension).      EMERGENCY DEPARTMENT COURSE and DIFFERENTIAL DIAGNOSIS/MDM:   Vitals:    Vitals:    02/14/23 1435 02/14/23 1447 02/14/23 1502 02/14/23 1606   BP:  (!) 149/104 (!) 163/108 (!) 165/97   Pulse:  84 92 94   Resp:  14 22 20   Temp:    98.4 °F (36.9 °C)   TempSrc:    Temporal   SpO2: 97% 96% 98% 99%   Weight:    135 lb 5.8 oz (61.4 kg)   Height:    5' 7\" (1.702 m)       Patient was given the following medications:  Medications   therapeutic multivitamin-minerals 1 tablet (1 tablet Oral Given 2/14/23 1215)   LORazepam (ATIVAN) tablet 1 mg (has no administration in time range)     Or   LORazepam (ATIVAN) injection 1 mg (has no administration in time range)     Or   LORazepam (ATIVAN) tablet 2 mg (has no administration in time range)     Or   LORazepam (ATIVAN) injection 2 mg (has no administration in time range)     Or   LORazepam (ATIVAN) tablet 3 mg (has no administration in time range)     Or   LORazepam (ATIVAN) injection 3 mg (has no administration in time range)     Or   LORazepam (ATIVAN) tablet 4 mg (has no administration in time range)     Or   LORazepam (ATIVAN) injection 4 mg (has no administration in time range)   0.9 % sodium chloride bolus (has no administration in time range)   sodium chloride flush 0.9 % injection 10 mL (has no administration in time range)   sodium chloride flush 0.9 % injection 10 mL (has no administration in time range)   0.9 % sodium chloride infusion (has no administration in time range)   enoxaparin (LOVENOX) injection 40 mg (has no administration in time range) ondansetron (ZOFRAN-ODT) disintegrating tablet 4 mg (has no administration in time range)     Or   ondansetron (ZOFRAN) injection 4 mg (has no administration in time range)   polyethylene glycol (GLYCOLAX) packet 17 g (has no administration in time range)   acetaminophen (TYLENOL) tablet 650 mg (has no administration in time range)     Or   acetaminophen (TYLENOL) suppository 650 mg (has no administration in time range)   sodium bicarbonate 100 mEq in dextrose 5 % 1,000 mL infusion (has no administration in time range)   thiamine mononitrate tablet 100 mg (has no administration in time range)   sodium chloride 0.9 % 9,054 mL with folic acid 1 mg, adult multi-vitamin with vitamin k 10 mL, thiamine 100 mg (has no administration in time range)   lactated ringers bolus (0 mLs IntraVENous Stopped 2/14/23 1432)             Is this patient to be included in the SEP-1 Core Measure due to severe sepsis or septic shock? No   Exclusion criteria - the patient is NOT to be included for SEP-1 Core Measure due to: no infection suspected      Chronic Conditions affecting care:    has a past medical history of Alcohol abuse with physiological dependence ([facility] [address] Essential hypertension (08/14/2020), and HTN (hypertension). CONSULTS: (Who and What was discussed)  IP CONSULT TO HOSPITALIST  IP CONSULT TO NEPHROLOGY  IP CONSULT TO SOCIAL WORK  IP CONSULT TO NEUROLOGY    Social Determinants Significantly Affecting Health : None    Records Reviewed (External and Source)     CC/HPI Summary, DDx, ED Course, and Reassessment: Patient presented with seizure-like activity. Did have some abrasion and evidence of tongue bite. No seizure-like activity here. Concern is for possible alcohol withdrawal.  Patient is a difficult historian. He states he did drink last week but unable to really quantify how much. Had initial lactic acid of 13.8 most likely from seizure-like activity. Does have transaminitis most likely from alcohol use.   Has no abdominal discomfort on exam.  CIWA scale was started. Patient will be admitted to hospital service for further work-up and treatment. Disposition Considerations (tests considered but not done, Admit vs D/C, Shared Decision Making, Pt Expectation of Test or Tx.):        I am the Primary Clinician of Record. FINAL IMPRESSION      1. Seizure-like activity ([facility] [address]    2. Alcohol use          DISPOSITION/PLAN     DISPOSITION Admitted 02/14/2023 02:51:31 PM      PATIENT REFERRED TO:  No follow-up provider specified.     DISCHARGE MEDICATIONS:  Current Discharge Medication List          DISCONTINUED MEDICATIONS:  Current Discharge Medication List                 (Please note that portions of this note were completed with a voice recognition program.  Efforts were made to edit the dictations but occasionally words are mis-transcribed.)    Coby Ng PA-C (electronically signed)        Coby Ng PA-C  02/14/23 [facility] Nidhi Woodall PA-C  02/14/23 [phone]

## 2023-02-14 NOTE — CONSULTS
Office : [phone]     Fax :[phone]       Nephrology Consult Note      Patient's Name: Álvaro Hernández  4:42 PM  2/14/2023    Reason for Consult:  metabolic acidosis      Requesting Physician:  Dr. Beka Rios       Chief Complaint:    Chief Complaint   Patient presents with    Seizures     Arrived per  EMS from home d/t possible seizure like activity; EMS found unresponsive; VSS;  without a known hx of DM; 18 gauge IV RAC             History of Present iIlness:    Álvaro Hernández is a 56 y.o. male with h/o alcohol abuse who was brought by EMS. He was found to be altered at home. Possibly had seizure. Has high lactic acid levels   Making urine       No intake/output data recorded. Past Medical History:   Diagnosis Date    Alcohol abuse with physiological dependence ([facility] [address]     Essential hypertension 08/14/2020    HTN (hypertension)        Past Surgical History:   Procedure Laterality Date    UPPER GASTROINTESTINAL ENDOSCOPY N/A 6/12/2020    EGD BIOPSY performed by Tania Eisenberg MD at [address] [place] [address] Drive History   Family history unknown: Yes        reports that he has quit smoking. He has never used smokeless tobacco. He reports that he does not currently use alcohol after a past usage of about 2.0 standard drinks per week. He reports that he does not currently use drugs. Allergies:  Patient has no known allergies.     Current Medications:    therapeutic multivitamin-minerals 1 tablet, Daily  LORazepam (ATIVAN) tablet 1 mg, Q1H PRN   Or  LORazepam (ATIVAN) injection 1 mg, Q1H PRN   Or  LORazepam (ATIVAN) tablet 2 mg, Q1H PRN   Or  LORazepam (ATIVAN) injection 2 mg, Q1H PRN   Or  LORazepam (ATIVAN) tablet 3 mg, Q1H PRN   Or  LORazepam (ATIVAN) injection 3 mg, Q1H PRN   Or  LORazepam (ATIVAN) tablet 4 mg, Q1H PRN   Or  LORazepam (ATIVAN) injection 4 mg, Q1H PRN  0.9 % sodium chloride bolus, Once  sodium chloride flush 0.9 % injection 10 mL, 2 times per day  sodium chloride flush 0.9 % injection 10 mL, PRN  0.9 % sodium chloride infusion, PRN  enoxaparin (LOVENOX) injection 40 mg, Daily  ondansetron (ZOFRAN-ODT) disintegrating tablet 4 mg, Q8H PRN   Or  ondansetron (ZOFRAN) injection 4 mg, Q6H PRN  polyethylene glycol (GLYCOLAX) packet 17 g, Daily PRN  acetaminophen (TYLENOL) tablet 650 mg, Q6H PRN   Or  acetaminophen (TYLENOL) suppository 650 mg, Q6H PRN  sodium bicarbonate 100 mEq in dextrose 5 % 1,000 mL infusion, Continuous  [START ON 2/15/2023] thiamine mononitrate tablet 100 mg, Daily  sodium chloride 0.9 % 7,472 mL with folic acid 1 mg, adult multi-vitamin with vitamin k 10 mL, thiamine 100 mg, Q24H        Review of Systems:   Confused       Physical exam:     Vitals:  BP (!) 165/97   Pulse 94   Temp 98.4 °F (36.9 °C) (Temporal)   Resp 20   Ht 5' 7\" (1.702 m)   Wt 135 lb 5.8 oz (61.4 kg)   SpO2 99%   BMI 21.20 kg/m²   Constitutional:  confused   Skin: no rash, turgor wnl  Heent:  eomi, mmm  Neck: no bruits or jvd noted  Cardiovascular:  S1, S2 without m/r/g  Respiratory: CTA B without w/r/r  Abdomen:  +bs, soft, nt, nd  Ext: no  lower extremity edema  Psychiatric: mood and affect appropriate  Musculoskeletal:  Rom, muscular strength intact    Labs:  CBC:   Recent Labs     02/14/23  1122   WBC 5.7   HGB 12.0*   *     BMP:    Recent Labs     02/14/23  1122   *   K 3.4*   CL 89*   CO2 10*   BUN 14   CREATININE 0.9   GLUCOSE 170*     Ca/Mg/Phos:   Recent Labs     02/14/23  1122   CALCIUM 8.8   MG 2.10     Hepatic:   Recent Labs     02/14/23  1122   *   ALT 92*   BILITOT 0.8   ALKPHOS 245*     Troponin:   Recent Labs     02/14/23  1122   TROPONINI <0.01            IMAGING:  XR CHEST PORTABLE   Final Result   No acute disease         CT HEAD WO CONTRAST   Preliminary Result   No acute intracranial abnormality. MRI BRAIN W WO CONTRAST    (Results Pending)                 Assessment/Plan :      1. High anion gap acidosis   High lactic acid levels   Likely 2/2 seizure   Check ABG     Give iv fluids       Recheck BMP       2. HTN  Controlled. Monitor BP   Iv fluids   Monitor I/O   Recheck lactic acid levels     3.  Elevated LFTS   H/o alcohol use           D/w primary team      Thank you for allowing us to participate in care of [facility]         Electronically signed by: Martin Mcghee MD, 2/14/2023, 4:42 PM      [facility] [address]  Office : [phone]  Fax :[phone]

## 2023-02-14 NOTE — H&P
Hospital Medicine History & Physical      PCP: No primary care provider on file. Date of Admission: 2/14/2023    Date of Service: Pt seen/examined on 2/14/2023 10:57 AM and   Admitted to Inpatient with expected LOS greater than two midnights due to medical therapy. Chief Complaint: Seizure-like activity    History Of Present Illness:    56 y.o. male with PMHx significant for previous alcohol abuse previous episode of seizure admitted to the hospital after possible seizure-like activity  History obtained from both the patient and his sister as there seems to be somewhat of a language barrier  Patient states that earlier in the day he was not feeling well  Also he has not had any drinks since 1 January  This morning sister noted that patient came to her and told her that she was not feeling very well  He was sitting on a chair  Subsequently the patient was violently shaking  And after that he seemed to be out of it  No urinary incontinence  Patient was not responsive as per the sister  Lasted around 8 to 9 minutes  Patient has no recollection  No chest pain no shortness of breath no abdominal pain nausea vomiting  No headache        Past Medical History:          Diagnosis Date    Alcohol abuse with physiological dependence ([facility] [address]     Essential hypertension 08/14/2020    HTN (hypertension)        Past Surgical History:          Procedure Laterality Date    UPPER GASTROINTESTINAL ENDOSCOPY N/A 6/12/2020    EGD BIOPSY performed by Shannan Grossman MD at [address] [place] [address]       Medications Prior to Admission:      Prior to Admission medications    Medication Sig Start Date End Date Taking?  Authorizing Provider   amLODIPine (NORVASC) 10 MG tablet Take 1 tablet by mouth daily 6/12/20   ADAM Traore CNP   folic acid (FOLVITE) 1 MG tablet Take 1 tablet by mouth daily 6/12/20   ADAM Traore CNP   pantoprazole (PROTONIX) 40 MG tablet Take 1 tablet by mouth every morning (before breakfast) 6/13/20   ADAM Traore CNP   vitamin B-1 100 MG tablet Take 1 tablet by mouth daily 6/13/20   ADAM Traore CNP   Multiple Vitamin (MULTIVITAMIN) TABS tablet Take 1 tablet by mouth daily 5/22/20   Historical Provider, MD   ondansetron (ZOFRAN ODT) 4 MG disintegrating tablet Take 1 tablet by mouth every 8 hours as needed for Nausea 6/9/20   HECTOR Yadav       Allergies:  Patient has no known allergies. Social History:      The patient currently lives at home     TOBACCO:   reports that he has quit smoking. He has never used smokeless tobacco.  ETOH:   reports that he does not currently use alcohol after a past usage of about 2.0 standard drinks per week. Family History:      Reviewed in detail and negative for DM, CAD, Cancer, CVA. Positive as follows:        Family history unknown: Yes       REVIEW OF SYSTEMS:   Pertinent positives as noted in the HPI. All other systems reviewed and negative. PHYSICAL EXAM:    BP (!) 165/97   Pulse 94   Temp 98.4 °F (36.9 °C) (Temporal)   Resp 20   Ht 5' 7\" (1.702 m)   Wt 135 lb 5.8 oz (61.4 kg)   SpO2 99%   BMI 21.20 kg/m²     General appearance:  No apparent distress, appears stated age and cooperative. HEENT:  Normal cephalic, atraumatic without obvious deformity. Pupils equal, round, and reactive to light. Extra ocular muscles intact. Conjunctivae/corneas clear. Neck: Supple, with full range of motion. No jugular venous distention. Trachea midline. Respiratory:  Normal respiratory effort. Clear to auscultation, bilaterally without RALES/WHEEZES/RHONCHI. Cardiovascular:  Regular rate and rhythm with normal S1/S2 without MURMUR, rubs or gallops. Abdomen: Soft, non-tender, non-distended with normal bowel sounds. Musculoskeletal:  No clubbing, cyanosis or EDEMA bilaterally. Full range of motion without deformity. Skin: Skin color, texture, turgor normal.  No rashes or lesions.   Neurologic: Neurovascularly intact without any focal sensory/motor deficits. Cranial nerves: II-XII intact, grossly non-focal.  Psychiatric:  Alert and oriented, thought content appropriate, normal insight  Capillary Refill: Brisk,< 3 seconds   Peripheral Pulses: +2 palpable, equal bilaterally       Labs:     Recent Labs     02/14/23  1122   WBC 5.7   HGB 12.0*   HCT 37.4*   *     Recent Labs     02/14/23  1122 02/14/23  1701   * 130*   K 3.4* 3.2*   CL 89* 93*   CO2 10* 27   BUN 14 10   CREATININE 0.9 0.6*   CALCIUM 8.8 9.1     Recent Labs     02/14/23  1122   *   ALT 92*   BILITOT 0.8   ALKPHOS 245*     No results for input(s): INR in the last 72 hours. Recent Labs     02/14/23  1122   TROPONINI <0.01       Urinalysis:      Lab Results   Component Value Date/Time    NITRU Negative 09/06/2020 08:23 PM    [address] 08/13/2020 09:22 PM    BACTERIA 1+ 07/20/2020 11:21 AM    RBCUA 1 08/13/2020 09:22 PM    BLOODU Negative 09/06/2020 08:23 PM    SPECGRAV 1.021 09/06/2020 08:23 PM    GLUCOSEU Negative 09/06/2020 08:23 PM       Radiology:     CXR: I have reviewed the CXR with the following interpretation: nad   EKG:  I have reviewed the EKG with the following interpretation: nsr     XR CHEST PORTABLE   Final Result   No acute disease         CT HEAD WO CONTRAST   Preliminary Result   No acute intracranial abnormality.          MRI BRAIN W WO CONTRAST    (Results Pending)       ASSESSMENT/PLAN:    Active Hospital Problems    Diagnosis Date Noted    Seizure [facility]) [R56.9] 02/14/2023     Priority: Medium       Acute Medical Issues Being Addressed:    52-year-old admitted to the hospital with seizure-like activity    Seizure-like activity may be related to previous alcohol abuse  Apparently had a similar episode a while back  CT of the head was negative  No focal neurology  Currently awake alert oriented  Will get MRI of the brain with and without contrast  Neurology consult  Given this is second episode we will start Keppra  EEG    Severe anion gap metabolic acidosis I strongly suspect may be related to lactic acidosis and some starvation ketosis  Given 2 IV liter boluses  Lactic acidosis improved from 13-2.1  Bicarbonate has normalized as has anion gap  We will continue IV fluids for now  Abdominal examination is benign  Check CK levels    Hypokalemia  Will replace and repeat    Previous history of alcohol abuse  Alcohol levels are negative  Will give rally pack    Abnormal LFT  May be related to previous alcohol abuse  Check ultrasound liver and acute hepatitis panel      All of the above discussed at length with the patient and his sister and voiced understanding      DVT Prophylaxis: sc lovenox   Diet: ADULT DIET; Regular  Code Status: Full Code    PT/OT Eval Status: will have eval if appropriate given patient's condition    Dispo - once acute medical process is resolved       Alex Delarosa MD    Thank you No primary care provider on file. for the opportunity to be involved in this patient's care. If you have any questions or concerns please feel free to contact me.

## 2023-02-14 NOTE — PROGRESS NOTES
This nurse obtained pt from ER, bedside bedside report received. Pt escorted via wheelchair to room 3358. Pt with family member present. Oriented to staff, room, and call light. Pt assisted to BR. Call light in reach.

## 2023-02-15 ENCOUNTER — APPOINTMENT (OUTPATIENT)
Dept: ULTRASOUND IMAGING | Age: 48
DRG: 101 | End: 2023-02-15
Payer: MEDICAID

## 2023-02-15 ENCOUNTER — APPOINTMENT (OUTPATIENT)
Dept: MRI IMAGING | Age: 48
DRG: 101 | End: 2023-02-15
Payer: MEDICAID

## 2023-02-15 PROBLEM — G40.109 EPILEPSY, FOCAL (HCC): Status: ACTIVE | Noted: 2023-02-15

## 2023-02-15 PROBLEM — F10.10 ETOH ABUSE: Status: ACTIVE | Noted: 2023-02-15

## 2023-02-15 PROBLEM — E87.1 HYPONATREMIA: Status: ACTIVE | Noted: 2023-02-15

## 2023-02-15 LAB
A/G RATIO: 0.9 (ref 1.1–2.2)
ALBUMIN SERPL-MCNC: 3.7 G/DL (ref 3.4–5)
ALP BLD-CCNC: 190 U/L (ref 40–129)
ALT SERPL-CCNC: 75 U/L (ref 10–40)
ANION GAP SERPL CALCULATED.3IONS-SCNC: 10 MMOL/L (ref 3–16)
AST SERPL-CCNC: 186 U/L (ref 15–37)
BASOPHILS ABSOLUTE: 0.1 K/UL (ref 0–0.2)
BASOPHILS RELATIVE PERCENT: 1.5 %
BILIRUB SERPL-MCNC: 1.3 MG/DL (ref 0–1)
BUN BLDV-MCNC: 6 MG/DL (ref 7–20)
CALCIUM SERPL-MCNC: 9 MG/DL (ref 8.3–10.6)
CHLORIDE BLD-SCNC: 96 MMOL/L (ref 99–110)
CO2: 27 MMOL/L (ref 21–32)
CREAT SERPL-MCNC: 0.7 MG/DL (ref 0.9–1.3)
EOSINOPHILS ABSOLUTE: 0 K/UL (ref 0–0.6)
EOSINOPHILS RELATIVE PERCENT: 1 %
GFR SERPL CREATININE-BSD FRML MDRD: >60 ML/MIN/{1.73_M2}
GLUCOSE BLD-MCNC: 139 MG/DL (ref 70–99)
HAV IGM SER IA-ACNC: NORMAL
HCT VFR BLD CALC: 34.3 % (ref 40.5–52.5)
HEMOGLOBIN: 11.9 G/DL (ref 13.5–17.5)
HEPATITIS B CORE IGM ANTIBODY: NORMAL
HEPATITIS B SURFACE ANTIGEN INTERPRETATION: NORMAL
HEPATITIS C ANTIBODY INTERPRETATION: NORMAL
INR BLD: 1.08 (ref 0.87–1.14)
LACTIC ACID: 1.5 MMOL/L (ref 0.4–2)
LYMPHOCYTES ABSOLUTE: 0.8 K/UL (ref 1–5.1)
LYMPHOCYTES RELATIVE PERCENT: 15 %
MAGNESIUM: 2.5 MG/DL (ref 1.8–2.4)
MCH RBC QN AUTO: 34.4 PG (ref 26–34)
MCHC RBC AUTO-ENTMCNC: 34.8 G/DL (ref 31–36)
MCV RBC AUTO: 98.8 FL (ref 80–100)
MONOCYTES ABSOLUTE: 0.5 K/UL (ref 0–1.3)
MONOCYTES RELATIVE PERCENT: 10.1 %
NEUTROPHILS ABSOLUTE: 3.7 K/UL (ref 1.7–7.7)
NEUTROPHILS RELATIVE PERCENT: 72.4 %
PDW BLD-RTO: 13.1 % (ref 12.4–15.4)
PLATELET # BLD: 104 K/UL (ref 135–450)
PMV BLD AUTO: 8.7 FL (ref 5–10.5)
POTASSIUM REFLEX MAGNESIUM: 3.8 MMOL/L (ref 3.5–5.1)
PROTHROMBIN TIME: 13.9 SEC (ref 11.7–14.5)
RBC # BLD: 3.47 M/UL (ref 4.2–5.9)
SODIUM BLD-SCNC: 133 MMOL/L (ref 136–145)
TOTAL PROTEIN: 7.9 G/DL (ref 6.4–8.2)
TSH REFLEX: 2.82 UIU/ML (ref 0.27–4.2)
WBC # BLD: 5.1 K/UL (ref 4–11)

## 2023-02-15 PROCEDURE — 80074 ACUTE HEPATITIS PANEL: CPT

## 2023-02-15 PROCEDURE — 95816 EEG AWAKE AND DROWSY: CPT | Performed by: PSYCHIATRY & NEUROLOGY

## 2023-02-15 PROCEDURE — 76705 ECHO EXAM OF ABDOMEN: CPT

## 2023-02-15 PROCEDURE — 6360000002 HC RX W HCPCS: Performed by: INTERNAL MEDICINE

## 2023-02-15 PROCEDURE — 2500000003 HC RX 250 WO HCPCS: Performed by: INTERNAL MEDICINE

## 2023-02-15 PROCEDURE — 95819 EEG AWAKE AND ASLEEP: CPT

## 2023-02-15 PROCEDURE — A9577 INJ MULTIHANCE: HCPCS | Performed by: PSYCHIATRY & NEUROLOGY

## 2023-02-15 PROCEDURE — 6370000000 HC RX 637 (ALT 250 FOR IP): Performed by: PHYSICIAN ASSISTANT

## 2023-02-15 PROCEDURE — 83735 ASSAY OF MAGNESIUM: CPT

## 2023-02-15 PROCEDURE — 6370000000 HC RX 637 (ALT 250 FOR IP): Performed by: INTERNAL MEDICINE

## 2023-02-15 PROCEDURE — 80053 COMPREHEN METABOLIC PANEL: CPT

## 2023-02-15 PROCEDURE — 36415 COLL VENOUS BLD VENIPUNCTURE: CPT

## 2023-02-15 PROCEDURE — 2580000003 HC RX 258: Performed by: INTERNAL MEDICINE

## 2023-02-15 PROCEDURE — 99223 1ST HOSP IP/OBS HIGH 75: CPT | Performed by: PSYCHIATRY & NEUROLOGY

## 2023-02-15 PROCEDURE — 83605 ASSAY OF LACTIC ACID: CPT

## 2023-02-15 PROCEDURE — 6360000004 HC RX CONTRAST MEDICATION: Performed by: PSYCHIATRY & NEUROLOGY

## 2023-02-15 PROCEDURE — 2060000000 HC ICU INTERMEDIATE R&B

## 2023-02-15 PROCEDURE — 70553 MRI BRAIN STEM W/O & W/DYE: CPT

## 2023-02-15 PROCEDURE — 85610 PROTHROMBIN TIME: CPT

## 2023-02-15 PROCEDURE — 85025 COMPLETE CBC W/AUTO DIFF WBC: CPT

## 2023-02-15 RX ORDER — LEVETIRACETAM 500 MG/1
500 TABLET ORAL 2 TIMES DAILY
Qty: 60 TABLET | Refills: 3 | Status: SHIPPED | OUTPATIENT
Start: 2023-02-15

## 2023-02-15 RX ADMIN — THIAMINE HCL TAB 100 MG 100 MG: 100 TAB at 10:08

## 2023-02-15 RX ADMIN — LEVETIRACETAM 500 MG: 500 TABLET, FILM COATED ORAL at 10:08

## 2023-02-15 RX ADMIN — ENOXAPARIN SODIUM 40 MG: 100 INJECTION SUBCUTANEOUS at 10:09

## 2023-02-15 RX ADMIN — LEVETIRACETAM 500 MG: 500 TABLET, FILM COATED ORAL at 20:25

## 2023-02-15 RX ADMIN — FOLIC ACID: 5 INJECTION, SOLUTION INTRAMUSCULAR; INTRAVENOUS; SUBCUTANEOUS at 18:20

## 2023-02-15 RX ADMIN — SODIUM CHLORIDE, PRESERVATIVE FREE 10 ML: 5 INJECTION INTRAVENOUS at 07:00

## 2023-02-15 RX ADMIN — Medication 1 TABLET: at 10:08

## 2023-02-15 RX ADMIN — GADOBENATE DIMEGLUMINE 12 ML: 529 INJECTION, SOLUTION INTRAVENOUS at 15:16

## 2023-02-15 NOTE — PROGRESS NOTES
Hospitalist Progress Note      PCP: No primary care provider on file.    Date of Admission: 2/14/2023    Chief Complaint: Seizure-like activity    Hospital Course: 47 y.o. M with PMHx significant for previous alcohol abuse (denies any alcohol intake since January 1, 2023) and episode of seizure admitted to the hospital after possible seizure-like activity.  Patient mention he had not been feeling well and that the day of presentation and was sitting on a chair when he started shaking violently with subsequent loss of consciousness.  There was no urinary incontinence.  Episode lasted ~ 8 to 9 minutes.      Subjective: Patient seen and examined.   No further episodes of seizures since admission.    Denies any complaints.      Medications:  Reviewed    Infusion Medications    sodium chloride      0.9% NaCl with KCl 20 mEq 100 mL/hr at 02/14/23 2110     Scheduled Medications    multivitamin  1 tablet Oral Daily    sodium chloride flush  10 mL IntraVENous 2 times per day    enoxaparin  40 mg SubCUTAneous Daily    thiamine  100 mg Oral Daily    folic acid, thiamine, multi-vitamin with vitamin K infusion   IntraVENous Q24H    levETIRAcetam  500 mg Oral BID     PRN Meds: LORazepam **OR** LORazepam **OR** LORazepam **OR** LORazepam **OR** LORazepam **OR** LORazepam **OR** LORazepam **OR** LORazepam, sodium chloride flush, sodium chloride, ondansetron **OR** ondansetron, polyethylene glycol, acetaminophen **OR** acetaminophen    No intake or output data in the 24 hours ending 02/15/23 1204    Physical Exam Performed:    /84   Pulse 77   Temp 98.6 °F (37 °C) (Temporal)   Resp 17   Ht 5' 7\" (1.702 m)   Wt 136 lb 3.9 oz (61.8 kg)   SpO2 97%   BMI 21.34 kg/m²     General appearance: No apparent distress, appears stated age and cooperative.  HEENT: Pupils equal, round, and reactive to light. Conjunctivae/corneas clear.  Neck: Supple, with full range of motion. No jugular venous distention. Trachea  midline. Respiratory:  Normal respiratory effort. Clear to auscultation, bilaterally without Rales/Wheezes/Rhonchi. Cardiovascular: Regular rate and rhythm with normal S1/S2 without murmurs, rubs or gallops. Abdomen: Soft, non-tender, non-distended with normal bowel sounds. Musculoskeletal: No clubbing, cyanosis or edema bilaterally. Full range of motion without deformity. Skin: Skin color, texture, turgor normal.  No rashes or lesions. Neurologic:  Neurovascularly intact without any focal sensory/motor deficits. Cranial nerves: II-XII intact, grossly non-focal.  Psychiatric: Alert and oriented, thought content appropriate, normal insight  Capillary Refill: Brisk, 3 seconds, normal   Peripheral Pulses: +2 palpable, equal bilaterally       Labs:   Recent Labs     02/14/23  1122 02/15/23  0504   WBC 5.7 5.1   HGB 12.0* 11.9*   HCT 37.4* 34.3*   * 104*     Recent Labs     02/14/23  1122 02/14/23  1701 02/15/23  0504   * 130* 133*   K 3.4* 3.2* 3.8   CL 89* 93* 96*   CO2 10* 27 27   BUN 14 10 6*   CREATININE 0.9 0.6* 0.7*   CALCIUM 8.8 9.1 9.0     Recent Labs     02/14/23  1122 02/15/23  0504   * 186*   ALT 92* 75*   BILITOT 0.8 1.3*   ALKPHOS 245* 190*     Recent Labs     02/15/23  0504   INR 1.08     Recent Labs     02/14/23  1122   TROPONINI <0.01       Urinalysis:      Lab Results   Component Value Date/Time    NITRU Negative 09/06/2020 08:23 PM    WBCUA 1 08/13/2020 09:22 PM    BACTERIA 1+ 07/20/2020 11:21 AM    RBCUA 1 08/13/2020 09:22 PM    BLOODU Negative 09/06/2020 08:23 PM    SPECGRAV 1.021 09/06/2020 08:23 PM    GLUCOSEU Negative 09/06/2020 08:23 PM       Radiology:  XR CHEST PORTABLE   Final Result   No acute disease         CT HEAD WO CONTRAST   Final Result   No acute intracranial abnormality.          MRI BRAIN W WO CONTRAST    (Results Pending)   US LIVER    (Results Pending)       IP CONSULT TO HOSPITALIST  IP CONSULT TO NEPHROLOGY  IP CONSULT TO SOCIAL WORK  IP CONSULT TO NEUROLOGY    Assessment/Plan:    Active Hospital Problems    Diagnosis     Epilepsy, focal ([facility] [address] [B56.664]      Priority: Medium    ETOH abuse [F10.10]      Priority: Medium    Hyponatremia [E87.1]      Priority: Medium    Seizure ([facility] [address] [R56.9]      Priority: Medium         New onset seizure:   Apparently had a similar episode a while back  CT of the head was negative  No focal neurology  Follow-up MRI of the brain with and without contrast  Neurology consult appreciated: Agree with Keppra. EEG pending. Severe anion gap metabolic acidosis:  Resolved with IV hydration. Hypokalemia: Replaced. Monitor electrolytes and replace as needed. Previous history of alcohol abuse:  Alcohol levels are negative. Abnormal LFT:  May be related to previous alcohol abuse  Check ultrasound liver. Negative acute hepatitis panel      DVT Prophylaxis: Lovenox  Diet: Diet NPO  Code Status: Full Code  PT/OT Eval Status: independent    Dispo -Home once medically stable.       Sarah Newell MD 98

## 2023-02-15 NOTE — PROGRESS NOTES
Report received from day shift RN. Patient resting comfortably in bed. Family at bedside. No signs of discomfort or distress. Bed is in lowest position, wheels locked, 2/2 side rails up. Bedside table and call light within reach. White board updated. Will continue to monitor patient. Richardson Burk RN    8:58 PM  Shift assessment complete. (See findings in flowsheet). Med pass complete. (See MAR). VSS. Patient with no complaints at this time. Richardson Burk RN    4:55 AM  No new events overnight. Patient resting quietly in bed.  Richardson Burk RN

## 2023-02-15 NOTE — PROGRESS NOTES
CLINICAL PHARMACY NOTE: MEDS TO BEDS    Total # of Prescriptions Filled: 1   The following medications were delivered to the patient:  Keppra 500 mg    Additional Documentation:  Delivered to Nitin RN=signed  Hammad Shepard CPhT

## 2023-02-15 NOTE — PROGRESS NOTES
Office : [phone]     Fax :[phone]       Nephrology progress  Note      Patient's Name: Les Gomze  10:01 AM  2/15/2023    Reason for Consult:  metabolic acidosis      Requesting Physician:  Dr. Sylvia Henderson       Chief Complaint:    Chief Complaint   Patient presents with    Seizures     Arrived per  EMS from home d/t possible seizure like activity; EMS found unresponsive; VSS;  without a known hx of DM; 18 gauge IV RAC             History of Present iIlness:    Les Gomez is a 56 y.o. male with h/o alcohol abuse who was brought by EMS. He was found to be altered at home. Possibly had seizure. Has high lactic acid levels   Making urine     Interval hx     Metabolic acidosis improved   Vitals stable   Good UOP   No intake/output data recorded. Past Medical History:   Diagnosis Date    Alcohol abuse with physiological dependence ([facility] [address]     Essential hypertension 08/14/2020    HTN (hypertension)        Past Surgical History:   Procedure Laterality Date    UPPER GASTROINTESTINAL ENDOSCOPY N/A 6/12/2020    EGD BIOPSY performed by Kota Singh MD at [address] [place] [address] Drive History   Family history unknown: Yes        reports that he has quit smoking. He has never used smokeless tobacco. He reports that he does not currently use alcohol after a past usage of about 2.0 standard drinks per week. He reports that he does not currently use drugs. Allergies:  Patient has no known allergies.     Current Medications:    therapeutic multivitamin-minerals 1 tablet, Daily  LORazepam (ATIVAN) tablet 1 mg, Q1H PRN   Or  LORazepam (ATIVAN) injection 1 mg, Q1H PRN   Or  LORazepam (ATIVAN) tablet 2 mg, Q1H PRN   Or  LORazepam (ATIVAN) injection 2 mg, Q1H PRN   Or  LORazepam (ATIVAN) tablet 3 mg, Q1H PRN   Or  LORazepam (ATIVAN) injection 3 mg, Q1H PRN   Or  LORazepam (ATIVAN) tablet 4 mg, Q1H PRN   Or  LORazepam (ATIVAN) injection 4 mg, Q1H PRN  sodium chloride flush 0.9 % injection 10 mL, 2 times per day  sodium chloride flush 0.9 % injection 10 mL, PRN  0.9 % sodium chloride infusion, PRN  enoxaparin (LOVENOX) injection 40 mg, Daily  ondansetron (ZOFRAN-ODT) disintegrating tablet 4 mg, Q8H PRN   Or  ondansetron (ZOFRAN) injection 4 mg, Q6H PRN  polyethylene glycol (GLYCOLAX) packet 17 g, Daily PRN  acetaminophen (TYLENOL) tablet 650 mg, Q6H PRN   Or  acetaminophen (TYLENOL) suppository 650 mg, Q6H PRN  thiamine mononitrate tablet 100 mg, Daily  sodium chloride 0.9 % 2,447 mL with folic acid 1 mg, adult multi-vitamin with vitamin k 10 mL, thiamine 100 mg, Q24H  0.9% NaCl with KCl 20 mEq infusion, Continuous  levETIRAcetam (KEPPRA) tablet 500 mg, BID          Physical exam:     Vitals:  /83   Pulse 73   Temp 99 °F (37.2 °C) (Temporal)   Resp 16   Ht 5' 7\" (1.702 m)   Wt 136 lb 3.9 oz (61.8 kg)   SpO2 98%   BMI 21.34 kg/m²   Constitutional:  confused   Skin: no rash, turgor wnl  Heent:  eomi, mmm  Neck: no bruits or jvd noted  Cardiovascular:  S1, S2 without m/r/g  Respiratory: CTA B without w/r/r  Abdomen:  +bs, soft, nt, nd  Ext: no  lower extremity edema    Recent Labs     02/14/23  1122 02/15/23  0504   WBC 5.7 5.1   HGB 12.0* 11.9*   * 104*     BMP:    Recent Labs     02/14/23  1122 02/14/23  1701 02/15/23  0504   * 130* 133*   K 3.4* 3.2* 3.8   CL 89* 93* 96*   CO2 10* 27 27   BUN 14 10 6*   CREATININE 0.9 0.6* 0.7*   GLUCOSE 170* 116* 139*     Ca/Mg/Phos:   Recent Labs     02/14/23  1122 02/14/23  1701 02/15/23  0504   CALCIUM 8.8 9.1 9.0   MG 2.10 1.90  --      Hepatic:   Recent Labs     02/14/23  1122 02/15/23  0504   * 186*   ALT 92* 75*   BILITOT 0.8 1.3*   ALKPHOS 245* 190*     Troponin:   Recent Labs     02/14/23 1122   TROPONINI <0.01 IMAGING:  XR CHEST PORTABLE   Final Result   No acute disease         CT HEAD WO CONTRAST   Final Result   No acute intracranial abnormality. MRI BRAIN W WO CONTRAST    (Results Pending)   US LIVER    (Results Pending)                 Assessment/Plan :      1. High anion gap acidosis   High lactic acid levels   Likely 2/2 seizure     Improved     Continue  iv fluids       2. HTN  Controlled. Monitor BP   Iv fluids   Monitor I/O   Recheck lactic acid levels     3.  Elevated LFTS   H/o alcohol use           D/w primary team      Thank you for allowing us to participate in care of Julio C Bloom         Electronically signed by: Sahil Nieto MD, 2/15/2023, 10:01 AM      [facility] [address]  Office : [phone]  Fax :[phone]

## 2023-02-15 NOTE — CONSULTS
In patient Neurology consult        [facility] Neurology      MD Graciela Osborn  1975    Date of Service: 2/15/2023    Referring Physician: Dionicio Antonio MD      Reason for the consult and CC: Possible breakthrough seizure    HPI:   The patient is a 56y.o.  years old male with history of alcohol abuse who was admitted to the hospital yesterday with possible breakthrough seizure. Symptoms started on ninth admission. Description he was not feeling well and called his sister to check on him. When she came in, he was having generalized body shaking with tonic-clonic activity for at least 2 to 3 minutes. No aura or warning. Degree was severe. No tongue biting or bladder incontinence but he had some postictal confusion. Patient came to the ED for evaluation. He was eventually admitted. Initial work-up revealed hyponatremia and anemia. Today he is back to his baseline. Noted deficit patient describes similar seizure back 2 years ago. No family history of epilepsy or history of head trauma with LOC. He does have history of alcohol use but he has been sober since last month. He denies use of drugs. No recent fever or chills. CT head in the emergency room was unremarkable. He was started on Keppra. Constitutional:   Vitals:    02/14/23 2340 02/15/23 0730 02/15/23 0753 02/15/23 1125   BP: (!) 144/87 139/83  128/84   Pulse: 79 73  77   Resp: 16 16  17   Temp: 98.2 °F (36.8 °C) 99 °F (37.2 °C)  98.6 °F (37 °C)   TempSrc: Temporal Temporal  Temporal   SpO2: 100% 98%  97%   Weight:   136 lb 3.9 oz (61.8 kg)    Height:             I personally reviewed and updated social history, past medical history, medications, allergy, surgical history, and family history as documented in the patient's electronic health records. ROS: 10-14 ROS reviewed with the patient/nurse/family which were unremarkable except mentioned in H&P.     General appearance:  Normal development and appear in no acute distress. Mental Status:   Oriented to person, place, problem, and time. Memory: Good immediate recall. Intact remote memory  Normal attention span and concentration. Language: intact naming, repeating and fluency   Good fund of Knowledge. Aware of current events and vocabulary   Cranial Nerves:   II: Visual fields: Full. Pupils: equal, round, reactive to light, bilaterally  III,IV,VI: Extra Ocular Movements are intact. No nystagmus  V: Facial sensation is intact  VII: Facial strength and movements: intact and symmetric  IX: Normal palatal elevation and shoulder shrug  XII: Tongue movements are normal  Musculoskeletal: 5/5 in all 4 extremities. Good range of motion. No muscle atrophy. Tone: Normal tone. Reflexes: Symmetric 2+ in the arms and 2+ in the legs   Planters: Flexor bilaterally  Coordination: no pronator drift, no dysmetria with FNF and normal REM  Sensation: normal in both arms and legs. Gait/Posture: steady gait with normal posturing and station.          Medical decision making:  Data: reviewed   LABS:   Lab Results   Component Value Date/Time     02/15/2023 05:04 AM    K 3.8 02/15/2023 05:04 AM    CL 96 02/15/2023 05:04 AM    CO2 27 02/15/2023 05:04 AM    BUN 6 02/15/2023 05:04 AM    CREATININE 0.7 02/15/2023 05:04 AM    GFRAA >60 09/06/2020 08:09 PM    LABGLOM >60 02/15/2023 05:04 AM    GLUCOSE 139 02/15/2023 05:04 AM    PHOS 3.2 08/14/2020 11:18 AM    MG 2.50 02/15/2023 05:04 AM    CALCIUM 9.0 02/15/2023 05:04 AM     Lab Results   Component Value Date/Time    WBC 5.1 02/15/2023 05:04 AM    RBC 3.47 02/15/2023 05:04 AM    HGB 11.9 02/15/2023 05:04 AM    HCT 34.3 02/15/2023 05:04 AM    MCV 98.8 02/15/2023 05:04 AM    RDW 13.1 02/15/2023 05:04 AM     02/15/2023 05:04 AM     Lab Results   Component Value Date    INR 1.08 02/15/2023    PROTIME 13.9 02/15/2023       Neuroimaging was independently reviewed by myself and discussed results with the patient  Reviewed notes from different physicians including H&P and ED notes. Reviewed lab and blood testing    Impression:  Possible new onset epilepsy giving second seizure. So far idiopathic cause. History of alcohol abuse  Hyponatremia  Alcoholic hepatitis    Recommendation:  Agree with Keppra 500 mg x 2  MRI brain with contrast  EEG  Discussed seizure precaution with the patient and his family. No driving for 3 months provided he remains seizure-free and cleared from his PCP. Discussed side effect of Keppra in details  Discussed risk of status epilepticus and falling  Hydration  Follow LFT and sodium level  DT precautions  DC planning after the above work-up. Thank you for referring such patient. If you have any questions regarding my consult note, please don't hesitate to call me. Melita Fisher MD  [phone]    This dictation was generated by voice recognition computer software.  Although all attempts are made to edit the dictation for accuracy, there may be errors in the  transcription that are not intended

## 2023-02-16 VITALS
WEIGHT: 136 LBS | BODY MASS INDEX: 21.35 KG/M2 | TEMPERATURE: 97.9 F | HEIGHT: 67 IN | HEART RATE: 95 BPM | DIASTOLIC BLOOD PRESSURE: 81 MMHG | SYSTOLIC BLOOD PRESSURE: 137 MMHG | OXYGEN SATURATION: 98 % | RESPIRATION RATE: 16 BRPM

## 2023-02-16 PROBLEM — K76.0 HEPATIC STEATOSIS: Status: ACTIVE | Noted: 2023-02-16

## 2023-02-16 PROBLEM — R79.89 ELEVATED LFTS: Status: ACTIVE | Noted: 2023-02-16

## 2023-02-16 LAB
A/G RATIO: 0.7 (ref 1.1–2.2)
ALBUMIN SERPL-MCNC: 3.4 G/DL (ref 3.4–5)
ALP BLD-CCNC: 165 U/L (ref 40–129)
ALT SERPL-CCNC: 67 U/L (ref 10–40)
ANION GAP SERPL CALCULATED.3IONS-SCNC: 10 MMOL/L (ref 3–16)
AST SERPL-CCNC: 123 U/L (ref 15–37)
BILIRUB SERPL-MCNC: 1.2 MG/DL (ref 0–1)
BUN BLDV-MCNC: 6 MG/DL (ref 7–20)
CALCIUM SERPL-MCNC: 8.9 MG/DL (ref 8.3–10.6)
CHLORIDE BLD-SCNC: 100 MMOL/L (ref 99–110)
CO2: 25 MMOL/L (ref 21–32)
CREAT SERPL-MCNC: 0.7 MG/DL (ref 0.9–1.3)
GFR SERPL CREATININE-BSD FRML MDRD: >60 ML/MIN/{1.73_M2}
GLUCOSE BLD-MCNC: 104 MG/DL (ref 70–99)
HCT VFR BLD CALC: 36.5 % (ref 40.5–52.5)
HEMOGLOBIN: 12.1 G/DL (ref 13.5–17.5)
MAGNESIUM: 2.2 MG/DL (ref 1.8–2.4)
MCH RBC QN AUTO: 33 PG (ref 26–34)
MCHC RBC AUTO-ENTMCNC: 33.2 G/DL (ref 31–36)
MCV RBC AUTO: 99.3 FL (ref 80–100)
PDW BLD-RTO: 13.4 % (ref 12.4–15.4)
PHOSPHORUS: 2.5 MG/DL (ref 2.5–4.9)
PLATELET # BLD: 99 K/UL (ref 135–450)
PMV BLD AUTO: 8.3 FL (ref 5–10.5)
POTASSIUM SERPL-SCNC: 3.7 MMOL/L (ref 3.5–5.1)
RBC # BLD: 3.68 M/UL (ref 4.2–5.9)
SODIUM BLD-SCNC: 135 MMOL/L (ref 136–145)
TOTAL PROTEIN: 8.1 G/DL (ref 6.4–8.2)
WBC # BLD: 4.1 K/UL (ref 4–11)

## 2023-02-16 PROCEDURE — 80053 COMPREHEN METABOLIC PANEL: CPT

## 2023-02-16 PROCEDURE — 83735 ASSAY OF MAGNESIUM: CPT

## 2023-02-16 PROCEDURE — 6370000000 HC RX 637 (ALT 250 FOR IP): Performed by: PHYSICIAN ASSISTANT

## 2023-02-16 PROCEDURE — 36415 COLL VENOUS BLD VENIPUNCTURE: CPT

## 2023-02-16 PROCEDURE — 84100 ASSAY OF PHOSPHORUS: CPT

## 2023-02-16 PROCEDURE — 6370000000 HC RX 637 (ALT 250 FOR IP): Performed by: INTERNAL MEDICINE

## 2023-02-16 PROCEDURE — 6360000002 HC RX W HCPCS: Performed by: INTERNAL MEDICINE

## 2023-02-16 PROCEDURE — 85027 COMPLETE CBC AUTOMATED: CPT

## 2023-02-16 PROCEDURE — 2580000003 HC RX 258: Performed by: INTERNAL MEDICINE

## 2023-02-16 PROCEDURE — 99232 SBSQ HOSP IP/OBS MODERATE 35: CPT | Performed by: PSYCHIATRY & NEUROLOGY

## 2023-02-16 RX ADMIN — ENOXAPARIN SODIUM 40 MG: 100 INJECTION SUBCUTANEOUS at 09:09

## 2023-02-16 RX ADMIN — LEVETIRACETAM 500 MG: 500 TABLET, FILM COATED ORAL at 09:09

## 2023-02-16 RX ADMIN — Medication 1 TABLET: at 09:09

## 2023-02-16 RX ADMIN — POTASSIUM CHLORIDE AND SODIUM CHLORIDE: 900; 150 INJECTION, SOLUTION INTRAVENOUS at 04:50

## 2023-02-16 RX ADMIN — THIAMINE HCL TAB 100 MG 100 MG: 100 TAB at 09:09

## 2023-02-16 RX ADMIN — SODIUM CHLORIDE, PRESERVATIVE FREE 10 ML: 5 INJECTION INTRAVENOUS at 09:09

## 2023-02-16 NOTE — DISCHARGE SUMMARY
[facility]    Patient Demographics    Frank Lopez  Date of Birth. 1975  MRN. [id number]     Primary care provider. No primary care provider on file. (Tel: None)    Admit date: 2/14/2023    Discharge date (blank if same as Note Date): Note Date: 2/16/2023     Reason for Hospitalization. Chief Complaint   Patient presents with    Seizures     Arrived per  EMS from home d/t possible seizure like activity; EMS found unresponsive; VSS;  without a known hx of DM; 18 gauge IV RAC           Significant Findings. Principal Problem:    Seizure ([address]  Active Problems:    Epilepsy, focal ([address]    ETOH abuse    Hyponatremia    Hepatic steatosis    Elevated LFTs  Resolved Problems:    * No resolved hospital problems. *       Problems and results from this hospitalization that need follow up. Fatty liver  Elevated liver enzymes  Seizures    Significant test results and incidental findings. US LIVER   Final Result   Hepatic steatosis. Unremarkable gallbladder. MRI BRAIN W WO CONTRAST   Final Result   No acute intracranial abnormality. Moderate chronic microvascular disease within the periventricular white   matter which has progressed since previous exam.         XR CHEST PORTABLE   Final Result   No acute disease         CT HEAD WO CONTRAST   Final Result   No acute intracranial abnormality. Invasive procedures and treatments. None     [facility] Course.  56 y.o. M with PMHx significant for previous alcohol abuse (denies any alcohol intake since January 1, 2023) and episode of seizure admitted to the hospital after possible seizure-like activity. Patient mention he had not been feeling well and that the day of presentation and was sitting on a chair when he started shaking violently with subsequent loss of consciousness. There was no urinary incontinence. Episode lasted ~ 8 to 9 minutes.       New onset seizure:   Apparently had a similar episode a while back  CT of the head was negative  MRI of the brain with and without contrast without acute abnormality. Neurology consult appreciated: Agreed with Keppra. EEG within normal limits. There is no evidence of epileptiform discharges, focal, or lateralizing abnormalities. No driving for 3 months until cleared by neurology/PCP. Outpatient follow-up with neurology in 2 months. Severe anion gap metabolic acidosis: Resolved with IV hydration. Hypokalemia: Replaced. Previous history of alcohol abuse:  Alcohol levels are negative. Abstinence reiterated. Hepatic steatosis and elevated liver enzymes: May be related to previous alcohol abuse  RUQ US with fatty liver. Negative acute hepatitis panel. LFTs downtrending. Will need outpatient follow-up. HTN: On amlodipine. Consults. IP CONSULT TO HOSPITALIST  IP CONSULT TO NEPHROLOGY  IP CONSULT TO SOCIAL WORK  IP CONSULT TO NEUROLOGY    Physical examination on discharge day. /81   Pulse 95   Temp 97.9 °F (36.6 °C) (Oral)   Resp 16   Ht 5' 7\" (1.702 m)   Wt 136 lb (61.7 kg)   SpO2 98%   BMI 21.30 kg/m²   General appearance. Alert. Looks comfortable. HEENT. Sclera clear. Moist mucus membranes. Cardiovascular. Regular rate and rhythm, normal S1, S2. No murmur. Respiratory. Not using accessory muscles. Clear to auscultation bilaterally, no wheeze. Gastrointestinal. Abdomen soft, non-tender, not distended, normal bowel sounds  Neurology. Facial symmetry. No speech deficits. Moving all extremities equally. Extremities. No edema in lower extremities. Skin. Warm, dry, normal turgor        Condition at time of discharge: Stable. Medication instructions provided to patient at discharge.      Medication List        START taking these medications      levETIRAcetam 500 MG tablet  Commonly known as: Keppra  Take 1 tablet by mouth 2 times daily            CONTINUE taking these medications      amLODIPine 10 MG tablet  Commonly known as: NORVASC  Take 1 tablet by mouth daily     folic acid 1 MG tablet  Commonly known as: FOLVITE  Take 1 tablet by mouth daily     multivitamin Tabs tablet     ondansetron 4 MG disintegrating tablet  Commonly known as: Zofran ODT  Take 1 tablet by mouth every 8 hours as needed for Nausea     pantoprazole 40 MG tablet  Commonly known as: PROTONIX  Take 1 tablet by mouth every morning (before breakfast)     thiamine 100 MG tablet  Take 1 tablet by mouth daily               Where to Get Your Medications        These medications were sent to [address]  [address] [facility], [address] [facility]      Phone: [phone]   levETIRAcetam 500 MG tablet         Discharge recommendations given to patient. Follow Up. With PCP as scheduled the [facility] clinic. Disposition. home  Activity. activity as tolerated but with no driving for 3 months. Diet: ADULT DIET; Regular      Spent 35 minutes in discharge process.     Signed:  Amber Cuenca MD     2/16/2023 12:17 PM

## 2023-02-16 NOTE — PROGRESS NOTES
Office : [phone]     Fax :[phone]       Nephrology progress  Note      Patient's Name: Emilio Luong  9:14 AM  2/16/2023    Reason for Consult:  metabolic acidosis      Requesting Physician:  Dr. Zen Keys       Chief Complaint:    Chief Complaint   Patient presents with    Seizures     Arrived per  EMS from home d/t possible seizure like activity; EMS found unresponsive; VSS;  without a known hx of DM; 18 gauge IV RAC             History of Present iIlness:    Emilio Luong is a 56 y.o. male with h/o alcohol abuse who was brought by EMS. He was found to be altered at home. Possibly had seizure. Has high lactic acid levels   Making urine     Interval hx     Metabolic acidosis improved   Vitals stable   Good UOP   I/O last 3 completed shifts: In: 12 [P.O.:240]  Out: -     Past Medical History:   Diagnosis Date    Alcohol abuse with physiological dependence [address] [address]     Essential hypertension 08/14/2020    HTN (hypertension)        Past Surgical History:   Procedure Laterality Date    UPPER GASTROINTESTINAL ENDOSCOPY N/A 6/12/2020    EGD BIOPSY performed by Joe Bagley MD at [address] [place] [address] Drive History   Family history unknown: Yes        reports that he has quit smoking. He has never used smokeless tobacco. He reports that he does not currently use alcohol after a past usage of about 2.0 standard drinks per week. He reports that he does not currently use drugs. Allergies:  Patient has no known allergies.     Current Medications:    therapeutic multivitamin-minerals 1 tablet, Daily  LORazepam (ATIVAN) tablet 1 mg, Q1H PRN   Or  LORazepam (ATIVAN) injection 1 mg, Q1H PRN   Or  LORazepam (ATIVAN) tablet 2 mg, Q1H PRN   Or  LORazepam (ATIVAN) injection 2 mg, Q1H PRN   Or  LORazepam (ATIVAN) tablet 3 mg, Q1H PRN   Or  LORazepam (ATIVAN) injection 3 mg, Q1H PRN   Or  LORazepam (ATIVAN) tablet 4 mg, Q1H PRN   Or  LORazepam (ATIVAN) injection 4 mg, Q1H PRN  sodium chloride flush 0.9 % injection 10 mL, 2 times per day  sodium chloride flush 0.9 % injection 10 mL, PRN  0.9 % sodium chloride infusion, PRN  enoxaparin (LOVENOX) injection 40 mg, Daily  ondansetron (ZOFRAN-ODT) disintegrating tablet 4 mg, Q8H PRN   Or  ondansetron (ZOFRAN) injection 4 mg, Q6H PRN  polyethylene glycol (GLYCOLAX) packet 17 g, Daily PRN  acetaminophen (TYLENOL) tablet 650 mg, Q6H PRN   Or  acetaminophen (TYLENOL) suppository 650 mg, Q6H PRN  thiamine mononitrate tablet 100 mg, Daily  sodium chloride 0.9 % 6,091 mL with folic acid 1 mg, adult multi-vitamin with vitamin k 10 mL, thiamine 100 mg, Q24H  0.9% NaCl with KCl 20 mEq infusion, Continuous  levETIRAcetam (KEPPRA) tablet 500 mg, BID          Physical exam:     Vitals:  /81   Pulse 95   Temp 97.9 °F (36.6 °C) (Oral)   Resp 16   Ht 5' 7\" (1.702 m)   Wt 136 lb (61.7 kg)   SpO2 98%   BMI 21.30 kg/m²   Constitutional:  confused   Skin: no rash, turgor wnl  Heent:  eomi, mmm  Neck: no bruits or jvd noted  Cardiovascular:  S1, S2 without m/r/g  Respiratory: CTA B without w/r/r  Abdomen:  +bs, soft, nt, nd  Ext: no  lower extremity edema    Recent Labs     02/14/23  1122 02/15/23  0504 02/16/23  0518   WBC 5.7 5.1 4.1   HGB 12.0* 11.9* 12.1*   * 104* 99*     BMP:    Recent Labs     02/14/23  1701 02/15/23  0504 02/16/23  0518   * 133* 135*   K 3.2* 3.8 3.7   CL 93* 96* 100   CO2 27 27 25   BUN 10 6* 6*   CREATININE 0.6* 0.7* 0.7*   GLUCOSE 116* 139* 104*     Ca/Mg/Phos:   Recent Labs     02/14/23  1701 02/15/23  0504 02/16/23  0518   CALCIUM 9.1 9.0 8.9   MG 1.90 2.50* 2.20   PHOS  --   --  2.5     Hepatic:   Recent Labs     02/14/23  1122 02/15/23  0504 02/16/23  0518   * 186* 123*   ALT 92* 75* 67*   BILITOT 0.8 1.3* 1.2* ALKPHOS 245* 190* 165*     Troponin:   Recent Labs     02/14/23  1122   TROPONINI <0.01            IMAGING:  US LIVER   Final Result   Hepatic steatosis. Unremarkable gallbladder. MRI BRAIN W WO CONTRAST   Final Result   No acute intracranial abnormality. Moderate chronic microvascular disease within the periventricular white   matter which has progressed since previous exam.         XR CHEST PORTABLE   Final Result   No acute disease         CT HEAD WO CONTRAST   Final Result   No acute intracranial abnormality. Assessment/Plan :      1. High anion gap acidosis   High lactic acid levels   Likely 2/2 seizure     Improved       2. HTN  Controlled. Monitor BP   Iv fluids   Monitor I/O   Recheck lactic acid levels     3. Elevated LFTS   H/o alcohol use       WILL SIGN OFF.  Call as needed           Thank you for allowing us to participate in care of Maria C Dixon         Electronically signed by: Sherry Patel MD, 2/16/2023, 9:14 AM      [facility] [address]  Office : [phone]  Fax :[phone]

## 2023-02-16 NOTE — PROGRESS NOTES
Emilio Luong  Neurology Follow-up  [facility]    Date of Service: 2/16/2023    Subjective:   CC: Follow up today regarding: Breakthrough seizure    Events noted. Chart and lab reviewed. The patient denies any new symptoms today. EEG showed no epileptiform discharges. MRI showed no acute finding. No headache, dysphagia or dysarthria, numbness or tingling or other review of system was unremarkable. ROS : A 10-12 system review obtained and updated today and is unremarkable except as mentioned  in my interval history. Past medical history, social history, medication and family history reviewed. Objective:  Exam:   Constitutional:   Vitals:    02/16/23 0015 02/16/23 0445 02/16/23 0728 02/16/23 0750   BP: (!) 141/71 135/85 137/81    Pulse: 75 71 95    Resp: 16 16 16    Temp: 98.1 °F (36.7 °C) 98.4 °F (36.9 °C) 97.9 °F (36.6 °C)    TempSrc: Oral Oral Oral    SpO2: 100% 99% 98%    Weight:    136 lb (61.7 kg)   Height:         General appearance:  Normal development and appear in no acute distress. Mental Status:   Oriented to person, place, problem, and time. Memory: Good immediate recall. Intact remote memory  Normal attention span and concentration. Language: intact naming, repeating and fluency   Good fund of Knowledge. Cranial Nerves:   II:   Pupils: equal, round, reactive to light  III,IV,VI: Extra Ocular Movements are intact. No nystagmus  V: Facial sensation is intact  VII: Facial strength and movements: intact and symmetric  XII: Tongue movements are normal  Musculoskeletal: 5/5 in all 4 extremities. Tone: Normal tone. Reflexes: Symmetric 2+ in both arms and legs.   Coordination: no pronator drift, no dysmetria with FNF  Sensation: normal.  Gait/Posture: steady gait        Data:  LABS:   Lab Results   Component Value Date/Time     02/16/2023 05:18 AM    K 3.7 02/16/2023 05:18 AM    K 3.8 02/15/2023 05:04 AM     02/16/2023 05:18 AM    CO2 25 02/16/2023 05:18 AM BUN 6 02/16/2023 05:18 AM    CREATININE 0.7 02/16/2023 05:18 AM    GFRAA >60 09/06/2020 08:09 PM    LABGLOM >60 02/16/2023 05:18 AM    GLUCOSE 104 02/16/2023 05:18 AM    PHOS 2.5 02/16/2023 05:18 AM    MG 2.20 02/16/2023 05:18 AM    CALCIUM 8.9 02/16/2023 05:18 AM     Lab Results   Component Value Date/Time    WBC 4.1 02/16/2023 05:18 AM    RBC 3.68 02/16/2023 05:18 AM    HGB 12.1 02/16/2023 05:18 AM    HCT 36.5 02/16/2023 05:18 AM    MCV 99.3 02/16/2023 05:18 AM    RDW 13.4 02/16/2023 05:18 AM    PLT 99 02/16/2023 05:18 AM     Lab Results   Component Value Date    INR 1.08 02/15/2023    PROTIME 13.9 02/15/2023       Neuroimaging was independently reviewed by me and discussed results with the patient  I reviewed blood testing and other test results and discussed results with the patient      Impression:  Breakthrough seizure and possible new onset epilepsy. So far idiopathic  History of alcohol abuse  Alcoholic hepatitis--follow LFT        Recommendation  MRI and EEG result discussed with the patient and his family  Continue Keppra 500 mg twice daily  Seizure precaution, risk of fall, injury and side effect of Keppra discussed today with the patient and family  No driving for 3 months and needs to be cleared from PCP  Continue DT precautions  Blood pressure monitor  Follow-up with me in 2 months  Can be discharged from neurology once medically stable  No further recommendation           Jazmine Mariee MD   [phone]      This dictation was generated by voice recognition computer software. Although all attempts are made to edit the dictation for accuracy, there may be errors in the transcription that are not intended.

## 2023-02-16 NOTE — DISCHARGE INSTR - COC
Continuity of Care Form    Patient Name: Ayah Barron   :  1975  MRN:  [id number]    Admit date:  2023  Discharge date:  ***    Code Status Order: Full Code   Advance Directives:     Admitting Physician:  Xavier Newby MD  PCP: No primary care provider on file. Discharging Nurse: [facility] Unit/Room#: [id number]-9982/8109-46  Discharging Unit Phone Number: ***    Emergency Contact:   Extended Emergency Contact Information  Primary Emergency Contact: [address], [address] [facility] Phone: [phone]  Relation: Spouse  Preferred language: English   needed? No  Secondary Emergency Contact: heidy pederson Phone: [phone]  Relation: Brother/Sister    Past Surgical History:  Past Surgical History:   Procedure Laterality Date    UPPER GASTROINTESTINAL ENDOSCOPY N/A 2020    EGD BIOPSY performed by Vickie Liz MD at [facility] END[facility]COPY       Immunization History: There is no immunization history on file for this patient.     Active Problems:  Patient Active Problem List   Diagnosis Code    Seizure-like activity ([address] R56.9    Alcohol dependence with withdrawal ([address] F10.239    Alcohol withdrawal, uncomplicated (HCC) H09.807    Altered mental status T08.81    Acute alcoholic gastritis without hemorrhage K29.20    Essential hypertension I10    Elevated LFTs R79.89    Frequent hospital admissions Z78.9    Seizure ([address] R56.9    Epilepsy, focal ([address] G40.109    ETOH abuse F10.10    Hyponatremia E87.1       Isolation/Infection:   Isolation            No Isolation          Patient Infection Status       Infection Onset Added Last Indicated Last Indicated By Review Planned Expiration Resolved Resolved By    None active    Resolved    COVID-19 (Rule Out) 20 COVID-19 (Ordered)   20 Rule-Out Test Resulted    C-diff Rule Out 20 Clostridium difficile toxin/antigen (Ordered)   20 Rule-Out Test Resulted    COVID-19 (Rule Out) 20 20 COVID-19 (Ordered)   20 Rule-Out Test Resulted            Nurse Assessment:  Last Vital Signs: /81   Pulse 95   Temp 97.9 °F (36.6 °C) (Oral)   Resp 16   Ht 5' 7\" (1.702 m)   Wt 136 lb (61.7 kg)   SpO2 98%   BMI 21.30 kg/m²     Last documented pain score (0-10 scale):    Last Weight:   Wt Readings from Last 1 Encounters:   23 136 lb (61.7 kg)     Mental Status:  {IP PT MENTAL STATUS:}    IV Access:  { PAULO IV ACCESS:297669843}    Nursing Mobility/ADLs:  Walking   {CHP DME TKVH:379688821}  Transfer  {CHP DME KKZD:112119914}  Bathing  {CHP DME GWSH:049745512}  Dressing  {CHP DME OLVP:670858659}  Toileting  {CHP DME JISX:019047452}  Feeding  {CHP DME PYAO:797608622}  Med Admin  {CHP DME KJMD:695309957}  Med Delivery   { PAULO MED Delivery:947737037}    Wound Care Documentation and Therapy:        Elimination:  Continence: Bowel: {YES / W}  Bladder: {YES / GW:71919}  Urinary Catheter: {Urinary Catheter:712243352}   Colostomy/Ileostomy/Ileal Conduit: {YES / GQ:17856}       Date of Last BM: ***    Intake/Output Summary (Last 24 hours) at 2023 1209  Last data filed at 2/15/2023 2153  Gross per 24 hour   Intake 240 ml   Output --   Net 240 ml     I/O last 3 completed shifts:   In: 240 [P.O.:240]  Out: -     Safety Concerns:     [address] [employer] Safety Concerns:747639675}    Impairments/Disabilities:      [address] [employer] Impairments/Disabilities:319027235}    Nutrition Therapy:  Current Nutrition Therapy:   [address] [employer] Diet List:305651531}    Routes of Feeding: {CHP DME Other Feedings:443887978}  Liquids: {Slp liquid thickness:32401}  Daily Fluid Restriction: {CHP DME Yes amt example:495725200}  Last Modified Barium Swallow with Video (Video Swallowing Test): {Done Not Done TOAX:300773263}    Treatments at the Time of Hospital Discharge:   Respiratory Treatments: ***  Oxygen Therapy:  {Therapy; copd oxygen:80270}  Ventilator:    {MH CC Vent XHIQ:222714363}    Rehab Therapies: {THERAPEUTIC INTERVENTION:9894561329}  Weight Bearing Status/Restrictions: 50Est[address] CC Weight Bearin}  Other Medical Equipment (for information only, NOT a DME order):  {EQUIPMENT:518932261}  Other Treatments: ***    Patient's personal belongings (please select all that are sent with patient):  {CHP DME Belongings:652659470}    RN SIGNATURE:  {Esignature:077141382}    CASE MANAGEMENT/SOCIAL WORK SECTION    Inpatient Status Date: ***    Readmission Risk Assessment Score:  Readmission Risk              Risk of Unplanned Readmission:  14           Discharging to Facility/ Agency   Name:   Address:  Phone:  Fax:    Dialysis Facility (if applicable)   Name:  Address:  Dialysis Schedule:  Phone:  Fax:    / signature: {Esignature:756137699}    PHYSICIAN SECTION    Prognosis: {Prognosis:5489165751}      Condition at Discharge: [address] Patient Condition:408849797}    Rehab Potential (if transferring to Rehab): {Prognosis:4086163821}    Recommended Labs or Other Treatments After Discharge: ***    Physician Certification: I certify the above information and transfer of Maura Guy  is necessary for the continuing treatment of the diagnosis listed and that he requires {Admit to Appropriate Level of Care:72188} for {GREATER/LESS:512780842} 30 days.      Update Admission H&P: {CHP DME Changes in UVJZQ:662687119}    PHYSICIAN SIGNATURE:  {Esignature:500119668}

## 2023-02-16 NOTE — PROGRESS NOTES
Report received from day shift RN. Patient resting comfortably in bed. No signs of discomfort or distress. Bed is in lowest position, wheels locked, 2/2 side rails up. Bedside table and call light within reach. White board updated. Will continue to monitor patient. Uvaldo Pritchett RN    9:54 PM  Shift assessment complete. (See findings in flowsheet). Med pass complete. (See MAR). VSS. Patient with no complaints at this time.  Uvaldo Pritchett RN

## 2023-02-16 NOTE — PROCEDURES
Patient: Matteo Linares    MR Number: [id number]  YOB: 1975  Date of Visit: 2/15/2023    Clinical History:  The patient is a 56y.o. years old male with recurrent seizures. Method: The EEG was performed utilizing the international 10/20 of electrode placements of both referential and bipolar montages. The patient was awake and drowsy through out the recording. Photic stimulation was performed. Findings: The background of the EEG showed normal alpha posterior background of -9-10- HZ and amplitude of 20-40 UV. This background was symmetric, waxing and waning, and reactive with eye opening and closure. As the patient became drowsy, generalized diffuse slowing was seen through recording at 6-7 HZ. This generalized slowing was symmetric, non rhythmical, and continuous. No spike or sharp waves were seen. Photic stimulation did not activate EEG. Impression: This EEG  is within normal limits. There is no evidence of epileptiform discharges, focal, or lateralizing abnormalities.       Kayli Martini MD      Board certified in clinical neurophysiology

## 2023-02-16 NOTE — DISCHARGE INSTRUCTIONS
Your information:  Name: Adi Ward  : 1975    Your Discharge Instructions    What to do after you leave the hospital:    Read, review and familiarize yourself with the information provided below and in a separate packet on   seizure activity, alcohol use    Diet: general    Recommended activity:   as tolerated  Avoid strenuous activity until instructed by your physician to resume; balance rest with periods of light to normal activity. If you experience any of the following: Unusual or inadequately controlled pain; unusual transient shortness of breath; recurrent or persistent nausea, heartburn, palpitations or lightheadedness; increased swelling; increased fatigue; fever >100; please follow up with @PCP@ [unfilled] or go to the Emergency Room. Home Health/ Outpatient Services: none      Information obtained by:  By signing below, I understand and acknowledge receipt of the instructions indicated above, and I understand that if any problems occur once I leave the hospital I am to contact @PCP@. No driving for 3 months until cleared by PCP or neurologist.  Follow-up with PCP for monitoring of liver enzymes. Avoid alcohol use. Medications as prescribed. Seizure precautions.

## 2023-02-24 ENCOUNTER — OFFICE VISIT (OUTPATIENT)
Dept: INTERNAL MEDICINE CLINIC | Age: 48
End: 2023-02-24
Payer: MEDICAID

## 2023-02-24 VITALS
SYSTOLIC BLOOD PRESSURE: 123 MMHG | WEIGHT: 139.7 LBS | DIASTOLIC BLOOD PRESSURE: 89 MMHG | HEART RATE: 109 BPM | HEIGHT: 68 IN | RESPIRATION RATE: 16 BRPM | BODY MASS INDEX: 21.17 KG/M2 | OXYGEN SATURATION: 96 % | TEMPERATURE: 98.7 F

## 2023-02-24 DIAGNOSIS — R56.9 SEIZURE (HCC): Primary | ICD-10-CM

## 2023-02-24 DIAGNOSIS — R79.89 ELEVATED LFTS: ICD-10-CM

## 2023-02-24 DIAGNOSIS — Z87.898 HISTORY OF ALCOHOL USE DISORDER: ICD-10-CM

## 2023-02-24 PROCEDURE — 99213 OFFICE O/P EST LOW 20 MIN: CPT | Performed by: STUDENT IN AN ORGANIZED HEALTH CARE EDUCATION/TRAINING PROGRAM

## 2023-02-24 ASSESSMENT — ENCOUNTER SYMPTOMS
GASTROINTESTINAL NEGATIVE: 1
RESPIRATORY NEGATIVE: 1
EYES NEGATIVE: 1

## 2023-02-24 NOTE — PATIENT INSTRUCTIONS
Please schedule an appointment to follow up with your neurologist in 2 months: Dr Rosalinda Langford MD  [phone]  Please take all your medications as prescribed especially your Levetriacetam 500 mg BID  Continue Abstaining from alcohol  Stay away from certain medications like Tramadol, Wellbutrin  Please have at least 8 hours of sleep. Sleep deprivation can increase chances of seizure. No Driving for at least 3 months  If you have any issues, please call the clinic  See you in 3 months !

## 2023-02-24 NOTE — PROGRESS NOTES
[facility] Outpatient [facility] Medicine [facility]    Ulysses Cornejo is a 56 y.o. male, past medical history of alcohol use disorder here for evaluation of the following concerns: Hospital discharge follow up     Patient was admitted to [facility] from 12/14-16 for a seizure episode. He was noted to have a witnessed seizure episode while hospitalized. CT head and MRI brain was overall unremarkable. EEG done revealed no focal epileptiform discharges. He denies any history of head trauma or medication changes that could induce seizure. He was reviewed by neurology and patient was started him on Keppra 500 mg twice daily and recommended review within 2 months posthospital discharge. Patient is seen with family member at clinic. He is originally from [place] and speaks Twi but is able to communicate with basic English during interview. He denies any more seizures since discharge. Reports that he officially stopped drinking alcohol approximately January 1, 2023 however he did have 1 beer 5 days prior to hospital admission. Since then he has had no alcohol usage. He denies any new complaints today. No headaches, neurological changes, chest pain, fever, abdominal pain, N/V. He is unsure when his neurology appointment is therefore I advised him to call the office to schedule an appointment. Review of Systems   Constitutional: Negative. HENT: Negative. Eyes: Negative. Respiratory: Negative. Cardiovascular: Negative. Gastrointestinal: Negative. Genitourinary: Negative. Musculoskeletal: Negative. MEDICATIONS:  Prior to Visit Medications    Medication Sig Taking?  Authorizing Provider   levETIRAcetam (KEPPRA) 500 MG tablet Take 1 tablet by mouth 2 times daily Yes Robert Gannon MD   amLODIPine (NORVASC) 10 MG tablet Take 1 tablet by mouth daily Yes ADAM Traore - CNP   folic acid (FOLVITE) 1 MG tablet Take 1 tablet by mouth daily Yes Jorge L Mai APRN - CNP   pantoprazole (PROTONIX) 40 MG tablet Take 1 tablet by mouth every morning (before breakfast) Yes ADAM Traore CNP   vitamin B-1 100 MG tablet Take 1 tablet by mouth daily Yes ADAM Traore CNP   Multiple Vitamin (MULTIVITAMIN) TABS tablet Take 1 tablet by mouth daily Yes Historical Provider, MD   ondansetron (ZOFRAN ODT) 4 MG disintegrating tablet Take 1 tablet by mouth every 8 hours as needed for Nausea Yes HECTOR Ramirez        Vitals:    02/24/23 1324   BP: 123/89   Site: Left Upper Arm   Position: Sitting   Cuff Size: Medium Adult   Pulse: (!) 109   Resp: 16   Temp: 98.7 °F (37.1 °C)   TempSrc: Oral   SpO2: 96%   Weight: 139 lb 11.2 oz (63.4 kg)   Height: 5' 7.5\" (1.715 m)      Estimated body mass index is 21.56 kg/m² as calculated from the following:    Height as of this encounter: 5' 7.5\" (1.715 m). Weight as of this encounter: 139 lb 11.2 oz (63.4 kg). Physical Exam  Constitutional:       Appearance: He is not ill-appearing. HENT:      Mouth/Throat:      Mouth: Mucous membranes are moist.      Pharynx: Oropharynx is clear. Cardiovascular:      Rate and Rhythm: Normal rate and regular rhythm. Pulses: Normal pulses. Heart sounds: Normal heart sounds. Pulmonary:      Effort: Pulmonary effort is normal.      Breath sounds: Normal breath sounds. Abdominal:      General: Bowel sounds are normal.      Palpations: Abdomen is soft. Musculoskeletal:         General: No swelling. Right lower leg: No edema. Left lower leg: No edema. Skin:     General: Skin is warm. Capillary Refill: Capillary refill takes less than 2 seconds. Coloration: Skin is not jaundiced. Neurological:      Mental Status: He is alert and oriented to person, place, and time. ASSESSMENT/PLAN:     1. Seizure ([address]  2.  History of alcohol use disorder  Referral: Carley Whitman MD, Neurology, Christian Landa   Continue taking Keppra 500mg BID  Seizure precautions advised. Pt advised to avoid certain drugs/medications like Tramadol, Bupropion, Cefepime that could decrease seizure threshold  Avoid alcohol use   Avoid sleep deprivation  No driving for at least 3 months. To be reviewed by Neurology/PCP to be cleared for driving thereafter once seizure free. 3. Elevated LFTs  Abstain from alcohol  Will monitor. Discussed repeating likely in 6 months       Return in about 3 months (around 5/24/2023) for Follow-up. The patient was staffed with the teaching attending: Dr. Benny Flores.     An electronic signature was used to authenticate this note.  _____________________  Ramon Johnson MD   Internal Medicine Resident, PGY-2  [facility]  [address] [place], [address]  Phone: [phone]

## 2023-05-01 ENCOUNTER — OFFICE VISIT (OUTPATIENT)
Dept: NEUROLOGY | Age: 48
End: 2023-05-01

## 2023-05-01 VITALS
OXYGEN SATURATION: 97 % | BODY MASS INDEX: 23.32 KG/M2 | WEIGHT: 140 LBS | HEIGHT: 65 IN | HEART RATE: 101 BPM | DIASTOLIC BLOOD PRESSURE: 81 MMHG | SYSTOLIC BLOOD PRESSURE: 107 MMHG

## 2023-05-01 DIAGNOSIS — G40.109 EPILEPSY, FOCAL (HCC): Primary | ICD-10-CM

## 2023-05-01 DIAGNOSIS — F10.10 ETOH ABUSE: ICD-10-CM

## 2023-05-01 PROCEDURE — 3079F DIAST BP 80-89 MM HG: CPT | Performed by: PSYCHIATRY & NEUROLOGY

## 2023-05-01 PROCEDURE — 3074F SYST BP LT 130 MM HG: CPT | Performed by: PSYCHIATRY & NEUROLOGY

## 2023-05-01 PROCEDURE — 99213 OFFICE O/P EST LOW 20 MIN: CPT | Performed by: PSYCHIATRY & NEUROLOGY

## 2023-05-01 NOTE — PROGRESS NOTES
updated social history, past medical history, medications, allergy, surgical history, and family history as documented in the patient's electronic health records. Labs and/or neuroimaging and other test results reviewed and discussed with the patient. Reviewed notes from other physicians. Provided patient education regarding risk, benefits and treatment options as well as adherence to medication regimen and side effect from these medications. Assessment:   Diagnosis Orders   1. Epilepsy, focal ([facility] [address]        2. ETOH abuse          Continue Keppra the same dose 500 mg twice daily  Refill when needed  Follow LFT  Discussed risk of falling, injury and risk of seizure recurrence  No driving for the next 3-month. He can restart driving in September if seizure-free.   Discussed risk of alcohol abuse  He voiced understanding  Follow-up 6-month

## 2023-10-27 ENCOUNTER — HOSPITAL ENCOUNTER (EMERGENCY)
Age: 48
Discharge: HOME | End: 2023-10-28

## 2023-10-27 ENCOUNTER — APPOINTMENT (OUTPATIENT)
Dept: CT IMAGING | Age: 48
End: 2023-10-27

## 2023-10-27 VITALS
BODY MASS INDEX: 23.32 KG/M2 | OXYGEN SATURATION: 98 % | HEART RATE: 99 BPM | HEIGHT: 65 IN | TEMPERATURE: 98.4 F | DIASTOLIC BLOOD PRESSURE: 100 MMHG | WEIGHT: 140 LBS | RESPIRATION RATE: 16 BRPM | SYSTOLIC BLOOD PRESSURE: 150 MMHG

## 2023-10-27 DIAGNOSIS — R10.13 ABDOMINAL PAIN, EPIGASTRIC: Primary | ICD-10-CM

## 2023-10-27 LAB
ALBUMIN SERPL-MCNC: 4.2 G/DL (ref 3.4–5)
ALBUMIN/GLOB SERPL: 1 {RATIO} (ref 1.1–2.2)
ALP SERPL-CCNC: 162 U/L (ref 40–129)
ALT SERPL-CCNC: 47 U/L (ref 10–40)
ANION GAP SERPL CALCULATED.3IONS-SCNC: 14 MMOL/L (ref 3–16)
AST SERPL-CCNC: 197 U/L (ref 15–37)
BASOPHILS # BLD: 0.1 K/UL (ref 0–0.2)
BASOPHILS NFR BLD: 4.4 %
BILIRUB SERPL-MCNC: 0.4 MG/DL (ref 0–1)
BUN SERPL-MCNC: 16 MG/DL (ref 7–20)
CALCIUM SERPL-MCNC: 8.6 MG/DL (ref 8.3–10.6)
CHLORIDE SERPL-SCNC: 98 MMOL/L (ref 99–110)
CO2 SERPL-SCNC: 24 MMOL/L (ref 21–32)
CREAT SERPL-MCNC: 0.9 MG/DL (ref 0.9–1.3)
DEPRECATED RDW RBC AUTO: 13.8 % (ref 12.4–15.4)
EOSINOPHIL # BLD: 0.1 K/UL (ref 0–0.6)
EOSINOPHIL NFR BLD: 4.3 %
GFR SERPLBLD CREATININE-BSD FMLA CKD-EPI: >60 ML/MIN/{1.73_M2}
GLUCOSE SERPL-MCNC: 145 MG/DL (ref 70–99)
HCT VFR BLD AUTO: 39.9 % (ref 40.5–52.5)
HGB BLD-MCNC: 13.3 G/DL (ref 13.5–17.5)
LIPASE SERPL-CCNC: 76 U/L (ref 13–60)
LYMPHOCYTES # BLD: 0.9 K/UL (ref 1–5.1)
LYMPHOCYTES NFR BLD: 34 %
MCH RBC QN AUTO: 32.4 PG (ref 26–34)
MCHC RBC AUTO-ENTMCNC: 33.4 G/DL (ref 31–36)
MCV RBC AUTO: 97.1 FL (ref 80–100)
MONOCYTES # BLD: 0.3 K/UL (ref 0–1.3)
MONOCYTES NFR BLD: 12 %
NEUTROPHILS # BLD: 1.2 K/UL (ref 1.7–7.7)
NEUTROPHILS NFR BLD: 45.3 %
PLATELET # BLD AUTO: 132 K/UL (ref 135–450)
PMV BLD AUTO: 7.4 FL (ref 5–10.5)
POTASSIUM SERPL-SCNC: 3.7 MMOL/L (ref 3.5–5.1)
PROT SERPL-MCNC: 8.3 G/DL (ref 6.4–8.2)
RBC # BLD AUTO: 4.1 M/UL (ref 4.2–5.9)
SODIUM SERPL-SCNC: 136 MMOL/L (ref 136–145)
WBC # BLD AUTO: 2.6 K/UL (ref 4–11)

## 2023-10-27 PROCEDURE — 96374 THER/PROPH/DIAG INJ IV PUSH: CPT

## 2023-10-27 PROCEDURE — 96375 TX/PRO/DX INJ NEW DRUG ADDON: CPT

## 2023-10-27 PROCEDURE — 6360000004 HC RX CONTRAST MEDICATION: Performed by: PHYSICIAN ASSISTANT

## 2023-10-27 PROCEDURE — 80053 COMPREHEN METABOLIC PANEL: CPT

## 2023-10-27 PROCEDURE — 99285 EMERGENCY DEPT VISIT HI MDM: CPT

## 2023-10-27 PROCEDURE — 85025 COMPLETE CBC W/AUTO DIFF WBC: CPT

## 2023-10-27 PROCEDURE — 83690 ASSAY OF LIPASE: CPT

## 2023-10-27 PROCEDURE — 2580000003 HC RX 258: Performed by: PHYSICIAN ASSISTANT

## 2023-10-27 PROCEDURE — 6360000002 HC RX W HCPCS: Performed by: PHYSICIAN ASSISTANT

## 2023-10-27 PROCEDURE — 74177 CT ABD & PELVIS W/CONTRAST: CPT

## 2023-10-27 PROCEDURE — C9113 INJ PANTOPRAZOLE SODIUM, VIA: HCPCS | Performed by: PHYSICIAN ASSISTANT

## 2023-10-27 RX ORDER — FAMOTIDINE 20 MG/1
20 TABLET, FILM COATED ORAL 2 TIMES DAILY
Qty: 60 TABLET | Refills: 0 | Status: SHIPPED | OUTPATIENT
Start: 2023-10-27

## 2023-10-27 RX ORDER — DICYCLOMINE HYDROCHLORIDE 10 MG/1
10 CAPSULE ORAL 4 TIMES DAILY
Qty: 30 CAPSULE | Refills: 0 | Status: SHIPPED | OUTPATIENT
Start: 2023-10-27

## 2023-10-27 RX ORDER — ONDANSETRON 4 MG/1
4-8 TABLET, ORALLY DISINTEGRATING ORAL EVERY 12 HOURS PRN
Qty: 20 TABLET | Refills: 0 | Status: SHIPPED | OUTPATIENT
Start: 2023-10-27

## 2023-10-27 RX ORDER — DICYCLOMINE HYDROCHLORIDE 10 MG/1
10 CAPSULE ORAL 4 TIMES DAILY
Qty: 30 CAPSULE | Refills: 0 | Status: SHIPPED | OUTPATIENT
Start: 2023-10-27 | End: 2023-10-27 | Stop reason: SDUPTHER

## 2023-10-27 RX ORDER — ONDANSETRON 2 MG/ML
4 INJECTION INTRAMUSCULAR; INTRAVENOUS ONCE
Status: COMPLETED | OUTPATIENT
Start: 2023-10-27 | End: 2023-10-27

## 2023-10-27 RX ORDER — 0.9 % SODIUM CHLORIDE 0.9 %
1000 INTRAVENOUS SOLUTION INTRAVENOUS ONCE
Status: COMPLETED | OUTPATIENT
Start: 2023-10-27 | End: 2023-10-28

## 2023-10-27 RX ORDER — FAMOTIDINE 20 MG/1
20 TABLET, FILM COATED ORAL 2 TIMES DAILY
Qty: 60 TABLET | Refills: 0 | Status: SHIPPED | OUTPATIENT
Start: 2023-10-27 | End: 2023-10-27 | Stop reason: SDUPTHER

## 2023-10-27 RX ORDER — MORPHINE SULFATE 4 MG/ML
4 INJECTION, SOLUTION INTRAMUSCULAR; INTRAVENOUS ONCE
Status: COMPLETED | OUTPATIENT
Start: 2023-10-27 | End: 2023-10-27

## 2023-10-27 RX ORDER — ONDANSETRON 4 MG/1
4-8 TABLET, ORALLY DISINTEGRATING ORAL EVERY 12 HOURS PRN
Qty: 20 TABLET | Refills: 0 | Status: SHIPPED | OUTPATIENT
Start: 2023-10-27 | End: 2023-10-27 | Stop reason: SDUPTHER

## 2023-10-27 RX ORDER — PANTOPRAZOLE SODIUM 40 MG/10ML
40 INJECTION, POWDER, LYOPHILIZED, FOR SOLUTION INTRAVENOUS ONCE
Status: COMPLETED | OUTPATIENT
Start: 2023-10-27 | End: 2023-10-27

## 2023-10-27 RX ADMIN — PANTOPRAZOLE SODIUM 40 MG: 40 INJECTION, POWDER, FOR SOLUTION INTRAVENOUS at 22:45

## 2023-10-27 RX ADMIN — IOPAMIDOL 75 ML: 755 INJECTION, SOLUTION INTRAVENOUS at 23:03

## 2023-10-27 RX ADMIN — ONDANSETRON 4 MG: 2 INJECTION INTRAMUSCULAR; INTRAVENOUS at 22:42

## 2023-10-27 RX ADMIN — SODIUM CHLORIDE 1000 ML: 9 INJECTION, SOLUTION INTRAVENOUS at 22:41

## 2023-10-27 RX ADMIN — MORPHINE SULFATE 4 MG: 4 INJECTION, SOLUTION INTRAMUSCULAR; INTRAVENOUS at 22:43

## 2023-10-27 ASSESSMENT — ENCOUNTER SYMPTOMS
DIARRHEA: 0
ABDOMINAL PAIN: 1
VOMITING: 1
NAUSEA: 1
WHEEZING: 0
SHORTNESS OF BREATH: 0
RHINORRHEA: 0
COUGH: 0

## 2023-10-27 ASSESSMENT — PAIN - FUNCTIONAL ASSESSMENT: PAIN_FUNCTIONAL_ASSESSMENT: 0-10

## 2023-10-27 ASSESSMENT — PAIN DESCRIPTION - LOCATION: LOCATION: ABDOMEN

## 2023-10-27 ASSESSMENT — LIFESTYLE VARIABLES
HOW MANY STANDARD DRINKS CONTAINING ALCOHOL DO YOU HAVE ON A TYPICAL DAY: PATIENT DOES NOT DRINK
HOW OFTEN DO YOU HAVE A DRINK CONTAINING ALCOHOL: NEVER

## 2023-10-27 ASSESSMENT — PAIN SCALES - GENERAL
PAINLEVEL_OUTOF10: 10
PAINLEVEL_OUTOF10: 10

## 2023-10-28 NOTE — ED PROVIDER NOTES
Jeremiah Hernandez        Pt Name: Gian Martinez  MRN: [id number]  [address] 1975  Date of evaluation: 10/27/2023  Provider: Eamon Gore PA-C  PCP: Chencho Hill MD  Note Started: 9:39 PM EDT 10/27/23      GRACIE. I have evaluated this patient. CHIEF COMPLAINT       Chief Complaint   Patient presents with    Abdominal Pain     Pt cc abdominal pain, pt reports abdominal pain that started today with three episodes of dark brown emesis. HISTORY OF PRESENT ILLNESS: 1 or more Elements     History From: patient   Limitations to history : None    Gian Martinez is a 53 y.o. male who presents for evaluation of abdominal pain with associated nausea and vomiting that started this morning. Patient states the vomit was dark brown. No bloody or black bowel movements. Most recent endoscopy in 2020. He does not take any daily antacids. History of alcohol abuse as well, most recent drink was a couple months ago. No history of pancreatitis. No history of any abdominal surgeries. No diarrhea. No known sick contacts with similar symptoms. No fevers or chills. He has no other complaints or concerns at this time. Nursing Notes were all reviewed and agreed with or any disagreements were addressed in the HPI. REVIEW OF SYSTEMS :      Review of Systems   Constitutional:  Negative for appetite change, chills and fever. HENT:  Negative for congestion and rhinorrhea. Respiratory:  Negative for cough, shortness of breath and wheezing. Cardiovascular:  Negative for chest pain. Gastrointestinal:  Positive for abdominal pain, nausea and vomiting. Negative for diarrhea. Genitourinary:  Negative for difficulty urinating, dysuria and hematuria. Musculoskeletal:  Negative for neck pain and neck stiffness. Skin:  Negative for rash. Neurological:  Negative for headaches. Positives and Pertinent negatives as per HPI.      SURGICAL

## 2023-12-23 ENCOUNTER — APPOINTMENT (OUTPATIENT)
Dept: CT IMAGING | Age: 48
End: 2023-12-23

## 2023-12-23 ENCOUNTER — APPOINTMENT (OUTPATIENT)
Dept: GENERAL RADIOLOGY | Age: 48
End: 2023-12-23

## 2023-12-23 ENCOUNTER — HOSPITAL ENCOUNTER (EMERGENCY)
Age: 48
Discharge: HOME | End: 2023-12-23
Attending: EMERGENCY MEDICINE

## 2023-12-23 VITALS
SYSTOLIC BLOOD PRESSURE: 148 MMHG | HEIGHT: 61 IN | OXYGEN SATURATION: 92 % | WEIGHT: 134.9 LBS | BODY MASS INDEX: 25.47 KG/M2 | HEART RATE: 87 BPM | TEMPERATURE: 97.8 F | DIASTOLIC BLOOD PRESSURE: 104 MMHG | RESPIRATION RATE: 17 BRPM

## 2023-12-23 DIAGNOSIS — F10.90 ALCOHOL USE DISORDER: Primary | ICD-10-CM

## 2023-12-23 DIAGNOSIS — F10.920 ACUTE ALCOHOLIC INTOXICATION WITHOUT COMPLICATION (HCC): ICD-10-CM

## 2023-12-23 LAB
ALBUMIN SERPL-MCNC: 4.7 G/DL (ref 3.4–5)
ALBUMIN/GLOB SERPL: 1.1 {RATIO} (ref 1.1–2.2)
ALP SERPL-CCNC: 124 U/L (ref 40–129)
ALT SERPL-CCNC: 116 U/L (ref 10–40)
AMPHETAMINES UR QL SCN>1000 NG/ML: ABNORMAL
ANION GAP SERPL CALCULATED.3IONS-SCNC: 17 MMOL/L (ref 3–16)
AST SERPL-CCNC: 251 U/L (ref 15–37)
BACTERIA URNS QL MICRO: NORMAL /HPF
BARBITURATES UR QL SCN>200 NG/ML: ABNORMAL
BASOPHILS # BLD: 0.1 K/UL (ref 0–0.2)
BASOPHILS NFR BLD: 3.6 %
BENZODIAZ UR QL SCN>200 NG/ML: POSITIVE
BILIRUB SERPL-MCNC: 0.5 MG/DL (ref 0–1)
BILIRUB UR QL STRIP.AUTO: NEGATIVE
BUN SERPL-MCNC: 8 MG/DL (ref 7–20)
CALCIUM SERPL-MCNC: 9.2 MG/DL (ref 8.3–10.6)
CANNABINOIDS UR QL SCN>50 NG/ML: ABNORMAL
CHLORIDE SERPL-SCNC: 95 MMOL/L (ref 99–110)
CLARITY UR: CLEAR
CO2 SERPL-SCNC: 28 MMOL/L (ref 21–32)
COCAINE UR QL SCN: ABNORMAL
COLOR UR: YELLOW
CREAT SERPL-MCNC: 0.8 MG/DL (ref 0.9–1.3)
DEPRECATED RDW RBC AUTO: 14.3 % (ref 12.4–15.4)
DRUG SCREEN COMMENT UR-IMP: ABNORMAL
EOSINOPHIL # BLD: 0.1 K/UL (ref 0–0.6)
EOSINOPHIL NFR BLD: 1.7 %
EPI CELLS #/AREA URNS AUTO: 0 /HPF (ref 0–5)
ETHANOLAMINE SERPL-MCNC: 344 MG/DL (ref 0–0.08)
FENTANYL SCREEN, URINE: ABNORMAL
GFR SERPLBLD CREATININE-BSD FMLA CKD-EPI: >60 ML/MIN/{1.73_M2}
GLUCOSE BLD-MCNC: 99 MG/DL (ref 70–99)
GLUCOSE SERPL-MCNC: 108 MG/DL (ref 70–99)
GLUCOSE UR STRIP.AUTO-MCNC: NEGATIVE MG/DL
HCT VFR BLD AUTO: 39.3 % (ref 40.5–52.5)
HGB BLD-MCNC: 13.7 G/DL (ref 13.5–17.5)
HGB UR QL STRIP.AUTO: ABNORMAL
HYALINE CASTS #/AREA URNS AUTO: 3 /LPF (ref 0–8)
KETONES UR STRIP.AUTO-MCNC: NEGATIVE MG/DL
LEUKOCYTE ESTERASE UR QL STRIP.AUTO: NEGATIVE
LIPASE SERPL-CCNC: 54 U/L (ref 13–60)
LYMPHOCYTES # BLD: 1.5 K/UL (ref 1–5.1)
LYMPHOCYTES NFR BLD: 44.8 %
MCH RBC QN AUTO: 33.7 PG (ref 26–34)
MCHC RBC AUTO-ENTMCNC: 34.9 G/DL (ref 31–36)
MCV RBC AUTO: 96.4 FL (ref 80–100)
METHADONE UR QL SCN>300 NG/ML: ABNORMAL
MONOCYTES # BLD: 0.4 K/UL (ref 0–1.3)
MONOCYTES NFR BLD: 10.4 %
NEUTROPHILS # BLD: 1.3 K/UL (ref 1.7–7.7)
NEUTROPHILS NFR BLD: 39.5 %
NITRITE UR QL STRIP.AUTO: NEGATIVE
NT-PROBNP SERPL-MCNC: <36 PG/ML (ref 0–124)
OPIATES UR QL SCN>300 NG/ML: ABNORMAL
OXYCODONE UR QL SCN: ABNORMAL
PCP UR QL SCN>25 NG/ML: ABNORMAL
PERFORMED ON: NORMAL
PH UR STRIP.AUTO: 5.5 [PH] (ref 5–8)
PH UR STRIP: 5.5 [PH]
PLATELET # BLD AUTO: 81 K/UL (ref 135–450)
PLATELET BLD QL SMEAR: ABNORMAL
PMV BLD AUTO: 8.1 FL (ref 5–10.5)
POTASSIUM SERPL-SCNC: 4.1 MMOL/L (ref 3.5–5.1)
PROT SERPL-MCNC: 8.9 G/DL (ref 6.4–8.2)
PROT UR STRIP.AUTO-MCNC: >=1000 MG/DL
RBC # BLD AUTO: 4.08 M/UL (ref 4.2–5.9)
RBC CLUMPS #/AREA URNS AUTO: 0 /HPF (ref 0–4)
SLIDE REVIEW: ABNORMAL
SODIUM SERPL-SCNC: 140 MMOL/L (ref 136–145)
SP GR UR STRIP.AUTO: 1.02 (ref 1–1.03)
TROPONIN, HIGH SENSITIVITY: 8 NG/L (ref 0–22)
UA COMPLETE W REFLEX CULTURE PNL UR: ABNORMAL
UA DIPSTICK W REFLEX MICRO PNL UR: YES
URN SPEC COLLECT METH UR: ABNORMAL
UROBILINOGEN UR STRIP-ACNC: 1 E.U./DL
WBC # BLD AUTO: 3.4 K/UL (ref 4–11)
WBC #/AREA URNS AUTO: 1 /HPF (ref 0–5)

## 2023-12-23 PROCEDURE — 80053 COMPREHEN METABOLIC PANEL: CPT

## 2023-12-23 PROCEDURE — 96375 TX/PRO/DX INJ NEW DRUG ADDON: CPT

## 2023-12-23 PROCEDURE — 2580000003 HC RX 258: Performed by: NURSE PRACTITIONER

## 2023-12-23 PROCEDURE — 74177 CT ABD & PELVIS W/CONTRAST: CPT

## 2023-12-23 PROCEDURE — 6360000004 HC RX CONTRAST MEDICATION: Performed by: NURSE PRACTITIONER

## 2023-12-23 PROCEDURE — 93005 ELECTROCARDIOGRAM TRACING: CPT | Performed by: EMERGENCY MEDICINE

## 2023-12-23 PROCEDURE — 2500000003 HC RX 250 WO HCPCS: Performed by: NURSE PRACTITIONER

## 2023-12-23 PROCEDURE — 85025 COMPLETE CBC W/AUTO DIFF WBC: CPT

## 2023-12-23 PROCEDURE — 84484 ASSAY OF TROPONIN QUANT: CPT

## 2023-12-23 PROCEDURE — 96366 THER/PROPH/DIAG IV INF ADDON: CPT

## 2023-12-23 PROCEDURE — 70450 CT HEAD/BRAIN W/O DYE: CPT

## 2023-12-23 PROCEDURE — 36415 COLL VENOUS BLD VENIPUNCTURE: CPT

## 2023-12-23 PROCEDURE — 99285 EMERGENCY DEPT VISIT HI MDM: CPT

## 2023-12-23 PROCEDURE — 71045 X-RAY EXAM CHEST 1 VIEW: CPT

## 2023-12-23 PROCEDURE — 82746 ASSAY OF FOLIC ACID SERUM: CPT

## 2023-12-23 PROCEDURE — 80307 DRUG TEST PRSMV CHEM ANLYZR: CPT

## 2023-12-23 PROCEDURE — 81001 URINALYSIS AUTO W/SCOPE: CPT

## 2023-12-23 PROCEDURE — 6360000002 HC RX W HCPCS: Performed by: NURSE PRACTITIONER

## 2023-12-23 PROCEDURE — 83690 ASSAY OF LIPASE: CPT

## 2023-12-23 PROCEDURE — 82077 ASSAY SPEC XCP UR&BREATH IA: CPT

## 2023-12-23 PROCEDURE — 83880 ASSAY OF NATRIURETIC PEPTIDE: CPT

## 2023-12-23 PROCEDURE — 82607 VITAMIN B-12: CPT

## 2023-12-23 PROCEDURE — 96365 THER/PROPH/DIAG IV INF INIT: CPT

## 2023-12-23 RX ORDER — FOLIC ACID 5 MG/ML
1 INJECTION, SOLUTION INTRAMUSCULAR; INTRAVENOUS; SUBCUTANEOUS ONCE
Status: COMPLETED | OUTPATIENT
Start: 2023-12-23 | End: 2023-12-23

## 2023-12-23 RX ORDER — THIAMINE HYDROCHLORIDE 100 MG/ML
100 INJECTION, SOLUTION INTRAMUSCULAR; INTRAVENOUS ONCE
Status: COMPLETED | OUTPATIENT
Start: 2023-12-23 | End: 2023-12-23

## 2023-12-23 RX ADMIN — THIAMINE HYDROCHLORIDE: 100 INJECTION, SOLUTION INTRAMUSCULAR; INTRAVENOUS at 20:22

## 2023-12-23 RX ADMIN — THIAMINE HYDROCHLORIDE 100 MG: 100 INJECTION, SOLUTION INTRAMUSCULAR; INTRAVENOUS at 19:57

## 2023-12-23 RX ADMIN — FOLIC ACID 1 MG: 5 INJECTION, SOLUTION INTRAMUSCULAR; INTRAVENOUS; SUBCUTANEOUS at 20:23

## 2023-12-23 RX ADMIN — IOPAMIDOL 75 ML: 755 INJECTION, SOLUTION INTRAVENOUS at 19:47

## 2023-12-24 LAB
EKG ATRIAL RATE: 89 BPM
EKG DIAGNOSIS: NORMAL
EKG P AXIS: 57 DEGREES
EKG P-R INTERVAL: 156 MS
EKG Q-T INTERVAL: 372 MS
EKG QRS DURATION: 82 MS
EKG QTC CALCULATION (BAZETT): 452 MS
EKG R AXIS: 48 DEGREES
EKG T AXIS: -45 DEGREES
EKG VENTRICULAR RATE: 89 BPM
FOLATE SERPL-MCNC: 10.18 NG/ML (ref 4.78–24.2)
VIT B12 SERPL-MCNC: 1382 PG/ML (ref 211–911)

## 2023-12-24 PROCEDURE — 93010 ELECTROCARDIOGRAM REPORT: CPT | Performed by: INTERNAL MEDICINE

## 2023-12-24 NOTE — ED PROVIDER NOTES
[facility]        Pt Name: William Rdz  MRN: [id number]  [address] 1975  Date of evaluation: 12/23/2023  Provider: ADAM Gordillo - CNP  PCP: Chuck Monsalve MD  Note Started: 7:15 PM EST 12/23/23       I have seen and evaluated this patient with my supervising physician adrián      CHIEF COMPLAINT       Chief Complaint   Patient presents with    patient here for  \"sickness\"       HISTORY OF PRESENT ILLNESS: 1 or more Elements     History from : Patient    Limitations to history : None    William Rdz is a 53 y.o. male who presents to the emergency department with difficulty walking, nausea, and dizziness ongoing for a week or more. This is patient's third visit to the emergency department, he was seen [facility] at [facility] previous to today's visit. Initially was seen on 12/13/2023 and then returned on 12/15/2023. On 1213, nothing was done and the patient was discharged home with diagnosis of \"tremulousness\", and 2 days later, the patient did have labs and ultimately was discharged home with the same diagnosis. Patient reports that the symptoms are severe, he is sleeping all day. States that he no longer drinks alcohol, quit 2 months ago. He reports that his family doctor who is also from [place] did prescribe medications to help him stop drinking and he takes this medication daily although he is not sure what it is called. States that he also takes Keppra as prescribed for history of seizure. He denies any recent seizure. Patient reports that he is not eating because he is sleeping and when he is awake he is too dizzy to do anything. Nursing Notes were all reviewed and agreed with or any disagreements were addressed in the HPI. REVIEW OF SYSTEMS :      Review of Systems   Constitutional:  Positive for activity change, appetite change and fatigue. Negative for chills and fever.    Respiratory:  Negative for

## 2024-01-11 ENCOUNTER — HOSPITAL ENCOUNTER (EMERGENCY)
Age: 49
Discharge: HOME | End: 2024-01-11
Attending: EMERGENCY MEDICINE

## 2024-01-11 VITALS
TEMPERATURE: 98.6 F | DIASTOLIC BLOOD PRESSURE: 98 MMHG | SYSTOLIC BLOOD PRESSURE: 145 MMHG | HEART RATE: 81 BPM | RESPIRATION RATE: 10 BRPM | OXYGEN SATURATION: 94 %

## 2024-01-11 DIAGNOSIS — R10.13 DYSPEPSIA: ICD-10-CM

## 2024-01-11 DIAGNOSIS — F10.920 ACUTE ALCOHOLIC INTOXICATION WITHOUT COMPLICATION (HCC): Primary | ICD-10-CM

## 2024-01-11 LAB
ALBUMIN SERPL-MCNC: 4.2 G/DL (ref 3.4–5)
ALBUMIN/GLOB SERPL: 1 {RATIO} (ref 1.1–2.2)
ALP SERPL-CCNC: 128 U/L (ref 40–129)
ALT SERPL-CCNC: 62 U/L (ref 10–40)
ANION GAP SERPL CALCULATED.3IONS-SCNC: 17 MMOL/L (ref 3–16)
AST SERPL-CCNC: 152 U/L (ref 15–37)
BASOPHILS # BLD: 0 K/UL (ref 0–0.2)
BASOPHILS NFR BLD: 0.7 %
BILIRUB SERPL-MCNC: 0.5 MG/DL (ref 0–1)
BUN SERPL-MCNC: 11 MG/DL (ref 7–20)
CALCIUM SERPL-MCNC: 8.9 MG/DL (ref 8.3–10.6)
CHLORIDE SERPL-SCNC: 95 MMOL/L (ref 99–110)
CO2 SERPL-SCNC: 24 MMOL/L (ref 21–32)
CREAT SERPL-MCNC: 0.8 MG/DL (ref 0.9–1.3)
DEPRECATED RDW RBC AUTO: 13.9 % (ref 12.4–15.4)
EKG ATRIAL RATE: 76 BPM
EKG DIAGNOSIS: NORMAL
EKG P AXIS: 63 DEGREES
EKG P-R INTERVAL: 154 MS
EKG Q-T INTERVAL: 400 MS
EKG QRS DURATION: 104 MS
EKG QTC CALCULATION (BAZETT): 450 MS
EKG R AXIS: 37 DEGREES
EKG T AXIS: 23 DEGREES
EKG VENTRICULAR RATE: 76 BPM
EOSINOPHIL # BLD: 0 K/UL (ref 0–0.6)
EOSINOPHIL NFR BLD: 1.3 %
ETHANOLAMINE SERPL-MCNC: 261 MG/DL (ref 0–0.08)
GFR SERPLBLD CREATININE-BSD FMLA CKD-EPI: >60 ML/MIN/{1.73_M2}
GLUCOSE SERPL-MCNC: 102 MG/DL (ref 70–99)
HCT VFR BLD AUTO: 38.2 % (ref 40.5–52.5)
HGB BLD-MCNC: 13.1 G/DL (ref 13.5–17.5)
LIPASE SERPL-CCNC: 54 U/L (ref 13–60)
LYMPHOCYTES # BLD: 1 K/UL (ref 1–5.1)
LYMPHOCYTES NFR BLD: 35.9 %
MCH RBC QN AUTO: 33.1 PG (ref 26–34)
MCHC RBC AUTO-ENTMCNC: 34.4 G/DL (ref 31–36)
MCV RBC AUTO: 96.2 FL (ref 80–100)
MONOCYTES # BLD: 0.3 K/UL (ref 0–1.3)
MONOCYTES NFR BLD: 11.3 %
NEUTROPHILS # BLD: 1.5 K/UL (ref 1.7–7.7)
NEUTROPHILS NFR BLD: 50.8 %
PLATELET # BLD AUTO: 96 K/UL (ref 135–450)
PMV BLD AUTO: 8.4 FL (ref 5–10.5)
POTASSIUM SERPL-SCNC: 4.3 MMOL/L (ref 3.5–5.1)
PROT SERPL-MCNC: 8.3 G/DL (ref 6.4–8.2)
RBC # BLD AUTO: 3.97 M/UL (ref 4.2–5.9)
SODIUM SERPL-SCNC: 136 MMOL/L (ref 136–145)
WBC # BLD AUTO: 2.9 K/UL (ref 4–11)

## 2024-01-11 PROCEDURE — 6370000000 HC RX 637 (ALT 250 FOR IP): Performed by: PHYSICIAN ASSISTANT

## 2024-01-11 PROCEDURE — 80053 COMPREHEN METABOLIC PANEL: CPT

## 2024-01-11 PROCEDURE — 96374 THER/PROPH/DIAG INJ IV PUSH: CPT

## 2024-01-11 PROCEDURE — 96375 TX/PRO/DX INJ NEW DRUG ADDON: CPT

## 2024-01-11 PROCEDURE — 2580000003 HC RX 258: Performed by: PHYSICIAN ASSISTANT

## 2024-01-11 PROCEDURE — 83690 ASSAY OF LIPASE: CPT

## 2024-01-11 PROCEDURE — 6360000002 HC RX W HCPCS: Performed by: PHYSICIAN ASSISTANT

## 2024-01-11 PROCEDURE — 93005 ELECTROCARDIOGRAM TRACING: CPT | Performed by: PHYSICIAN ASSISTANT

## 2024-01-11 PROCEDURE — 85025 COMPLETE CBC W/AUTO DIFF WBC: CPT

## 2024-01-11 PROCEDURE — 99284 EMERGENCY DEPT VISIT MOD MDM: CPT

## 2024-01-11 PROCEDURE — 93010 ELECTROCARDIOGRAM REPORT: CPT | Performed by: INTERNAL MEDICINE

## 2024-01-11 PROCEDURE — 82077 ASSAY SPEC XCP UR&BREATH IA: CPT

## 2024-01-11 RX ORDER — KETOROLAC TROMETHAMINE 15 MG/ML
15 INJECTION, SOLUTION INTRAMUSCULAR; INTRAVENOUS ONCE
Status: DISCONTINUED | OUTPATIENT
Start: 2024-01-11 | End: 2024-01-11

## 2024-01-11 RX ORDER — DIPHENHYDRAMINE HYDROCHLORIDE 50 MG/ML
25 INJECTION INTRAMUSCULAR; INTRAVENOUS ONCE
Status: COMPLETED | OUTPATIENT
Start: 2024-01-11 | End: 2024-01-11

## 2024-01-11 RX ORDER — ACETAMINOPHEN 500 MG
1000 TABLET ORAL ONCE
Status: COMPLETED | OUTPATIENT
Start: 2024-01-11 | End: 2024-01-11

## 2024-01-11 RX ORDER — 0.9 % SODIUM CHLORIDE 0.9 %
1000 INTRAVENOUS SOLUTION INTRAVENOUS ONCE
Status: COMPLETED | OUTPATIENT
Start: 2024-01-11 | End: 2024-01-11

## 2024-01-11 RX ORDER — METOCLOPRAMIDE HYDROCHLORIDE 5 MG/ML
10 INJECTION INTRAMUSCULAR; INTRAVENOUS ONCE
Status: COMPLETED | OUTPATIENT
Start: 2024-01-11 | End: 2024-01-11

## 2024-01-11 RX ADMIN — METOCLOPRAMIDE 10 MG: 5 INJECTION, SOLUTION INTRAMUSCULAR; INTRAVENOUS at 02:11

## 2024-01-11 RX ADMIN — DIPHENHYDRAMINE HYDROCHLORIDE 25 MG: 50 INJECTION INTRAMUSCULAR; INTRAVENOUS at 02:11

## 2024-01-11 RX ADMIN — ALUMINUM HYDROXIDE, MAGNESIUM HYDROXIDE, AND SIMETHICONE: 200; 200; 20 SUSPENSION ORAL at 02:09

## 2024-01-11 RX ADMIN — SODIUM CHLORIDE 1000 ML: 9 INJECTION, SOLUTION INTRAVENOUS at 02:11

## 2024-01-11 RX ADMIN — ACETAMINOPHEN 1000 MG: 500 TABLET ORAL at 02:09

## 2024-01-11 ASSESSMENT — ENCOUNTER SYMPTOMS
ABDOMINAL PAIN: 1
RHINORRHEA: 0
SHORTNESS OF BREATH: 0
COUGH: 0
NAUSEA: 0
VOMITING: 0
DIARRHEA: 0

## 2024-01-11 NOTE — ED PROVIDER NOTES
placed or performed during the hospital encounter of 01/11/24   CBC with Auto Differential   Result Value Ref Range    WBC 2.9 (L) 4.0 - 11.0 K/uL    RBC 3.97 (L) 4.20 - 5.90 M/uL    Hemoglobin 13.1 (L) 13.5 - 17.5 g/dL    Hematocrit 38.2 (L) 40.5 - 52.5 %    MCV 96.2 80.0 - 100.0 fL    MCH 33.1 26.0 - 34.0 pg    MCHC 34.4 31.0 - 36.0 g/dL    RDW 13.9 12.4 - 15.4 %    Platelets 96 (L) 135 - 450 K/uL    MPV 8.4 5.0 - 10.5 fL    Neutrophils % 50.8 %    Lymphocytes % 35.9 %    Monocytes % 11.3 %    Eosinophils % 1.3 %    Basophils % 0.7 %    Neutrophils Absolute 1.5 (L) 1.7 - 7.7 K/uL    Lymphocytes Absolute 1.0 1.0 - 5.1 K/uL    Monocytes Absolute 0.3 0.0 - 1.3 K/uL    Eosinophils Absolute 0.0 0.0 - 0.6 K/uL    Basophils Absolute 0.0 0.0 - 0.2 K/uL   Comprehensive Metabolic Panel   Result Value Ref Range    Sodium 136 136 - 145 mmol/L    Potassium 4.3 3.5 - 5.1 mmol/L    Chloride 95 (L) 99 - 110 mmol/L    CO2 24 21 - 32 mmol/L    Anion Gap 17 (H) 3 - 16    Glucose 102 (H) 70 - 99 mg/dL    BUN 11 7 - 20 mg/dL    Creatinine 0.8 (L) 0.9 - 1.3 mg/dL    Est, Glom Filt Rate >60 >60    Calcium 8.9 8.3 - 10.6 mg/dL    Total Protein 8.3 (H) 6.4 - 8.2 g/dL    Albumin 4.2 3.4 - 5.0 g/dL    Albumin/Globulin Ratio 1.0 (L) 1.1 - 2.2    Total Bilirubin 0.5 0.0 - 1.0 mg/dL    Alkaline Phosphatase 128 40 - 129 U/L    ALT 62 (H) 10 - 40 U/L     (H) 15 - 37 U/L   Lipase   Result Value Ref Range    Lipase 54.0 13.0 - 60.0 U/L   Ethanol   Result Value Ref Range    Ethanol Lvl 261 mg/dL   EKG 12 Lead   Result Value Ref Range    Ventricular Rate 76 BPM    Atrial Rate 76 BPM    P-R Interval 154 ms    QRS Duration 104 ms    Q-T Interval 410 ms    QTc Calculation (Bazett) 461 ms    P Axis 63 degrees    R Axis 37 degrees    T Axis 23 degrees    Diagnosis       Normal sinus rhythmModerate voltage criteria for LVH, may be normal variantBorderline ECG         I estimate there is LOW risk acute appendicitis, acute cholecystitis, cholangitis,

## 2024-01-27 ENCOUNTER — APPOINTMENT (OUTPATIENT)
Dept: CT IMAGING | Age: 49
DRG: 392 | End: 2024-01-27
Payer: MEDICAID

## 2024-01-27 ENCOUNTER — APPOINTMENT (OUTPATIENT)
Dept: GENERAL RADIOLOGY | Age: 49
DRG: 392 | End: 2024-01-27
Payer: MEDICAID

## 2024-01-27 ENCOUNTER — HOSPITAL ENCOUNTER (INPATIENT)
Age: 49
LOS: 2 days | Discharge: HOME | DRG: 392 | End: 2024-01-29
Attending: EMERGENCY MEDICINE | Admitting: STUDENT IN AN ORGANIZED HEALTH CARE EDUCATION/TRAINING PROGRAM
Payer: MEDICAID

## 2024-01-27 DIAGNOSIS — F10.931 ALCOHOL WITHDRAWAL SYNDROME, WITH DELIRIUM (HCC): Primary | ICD-10-CM

## 2024-01-27 DIAGNOSIS — R94.31 PROLONGED QT INTERVAL: ICD-10-CM

## 2024-01-27 LAB
ALBUMIN SERPL-MCNC: 4 G/DL (ref 3.4–5)
ALBUMIN/GLOB SERPL: 0.9 {RATIO} (ref 1.1–2.2)
ALP SERPL-CCNC: 83 U/L (ref 40–129)
ALT SERPL-CCNC: 88 U/L (ref 10–40)
AMMONIA PLAS-SCNC: 31 UMOL/L (ref 16–60)
AMPHETAMINES UR QL SCN>1000 NG/ML: ABNORMAL
ANION GAP SERPL CALCULATED.3IONS-SCNC: 13 MMOL/L (ref 3–16)
AST SERPL-CCNC: 111 U/L (ref 15–37)
BARBITURATES UR QL SCN>200 NG/ML: POSITIVE
BASOPHILS # BLD: 0.1 K/UL (ref 0–0.2)
BASOPHILS NFR BLD: 1.7 %
BENZODIAZ UR QL SCN>200 NG/ML: ABNORMAL
BILIRUB SERPL-MCNC: 0.5 MG/DL (ref 0–1)
BILIRUB UR QL STRIP.AUTO: NEGATIVE
BUN SERPL-MCNC: 6 MG/DL (ref 7–20)
CALCIUM SERPL-MCNC: 9 MG/DL (ref 8.3–10.6)
CANNABINOIDS UR QL SCN>50 NG/ML: ABNORMAL
CHLORIDE SERPL-SCNC: 94 MMOL/L (ref 99–110)
CLARITY UR: CLEAR
CO2 SERPL-SCNC: 25 MMOL/L (ref 21–32)
COCAINE UR QL SCN: ABNORMAL
COLOR UR: YELLOW
CREAT SERPL-MCNC: 0.6 MG/DL (ref 0.9–1.3)
DEPRECATED RDW RBC AUTO: 14 % (ref 12.4–15.4)
DRUG SCREEN COMMENT UR-IMP: ABNORMAL
EKG ATRIAL RATE: 197 BPM
EKG DIAGNOSIS: NORMAL
EKG P AXIS: 69 DEGREES
EKG Q-T INTERVAL: 500 MS
EKG QRS DURATION: 96 MS
EKG QTC CALCULATION (BAZETT): 507 MS
EKG R AXIS: 57 DEGREES
EKG T AXIS: 228 DEGREES
EKG VENTRICULAR RATE: 62 BPM
EOSINOPHIL # BLD: 0.1 K/UL (ref 0–0.6)
EOSINOPHIL NFR BLD: 1.7 %
ETHANOLAMINE SERPL-MCNC: NORMAL MG/DL (ref 0–0.08)
FENTANYL SCREEN, URINE: ABNORMAL
GFR SERPLBLD CREATININE-BSD FMLA CKD-EPI: >60 ML/MIN/{1.73_M2}
GLUCOSE BLD-MCNC: 103 MG/DL (ref 70–99)
GLUCOSE SERPL-MCNC: 112 MG/DL (ref 70–99)
GLUCOSE UR STRIP.AUTO-MCNC: NEGATIVE MG/DL
HCT VFR BLD AUTO: 38.1 % (ref 40.5–52.5)
HGB BLD-MCNC: 13.1 G/DL (ref 13.5–17.5)
HGB UR QL STRIP.AUTO: NEGATIVE
INR PPP: 1.16 (ref 0.84–1.16)
KETONES UR STRIP.AUTO-MCNC: NEGATIVE MG/DL
LACTATE BLDV-SCNC: 1.2 MMOL/L (ref 0.4–1.9)
LACTATE BLDV-SCNC: 2.9 MMOL/L (ref 0.4–1.9)
LEUKOCYTE ESTERASE UR QL STRIP.AUTO: NEGATIVE
LIPASE SERPL-CCNC: 27 U/L (ref 13–60)
LYMPHOCYTES # BLD: 1.2 K/UL (ref 1–5.1)
LYMPHOCYTES NFR BLD: 31.4 %
MCH RBC QN AUTO: 33 PG (ref 26–34)
MCHC RBC AUTO-ENTMCNC: 34.3 G/DL (ref 31–36)
MCV RBC AUTO: 96.4 FL (ref 80–100)
METHADONE UR QL SCN>300 NG/ML: ABNORMAL
MONOCYTES # BLD: 0.4 K/UL (ref 0–1.3)
MONOCYTES NFR BLD: 10 %
NEUTROPHILS # BLD: 2.1 K/UL (ref 1.7–7.7)
NEUTROPHILS NFR BLD: 55.2 %
NITRITE UR QL STRIP.AUTO: NEGATIVE
OPIATES UR QL SCN>300 NG/ML: ABNORMAL
OXYCODONE UR QL SCN: ABNORMAL
PCP UR QL SCN>25 NG/ML: ABNORMAL
PERFORMED ON: ABNORMAL
PH UR STRIP.AUTO: 5.5 [PH] (ref 5–8)
PH UR STRIP: 5.5 [PH]
PLATELET # BLD AUTO: 230 K/UL (ref 135–450)
PMV BLD AUTO: 7.6 FL (ref 5–10.5)
POTASSIUM SERPL-SCNC: 4.5 MMOL/L (ref 3.5–5.1)
PROT SERPL-MCNC: 8.4 G/DL (ref 6.4–8.2)
PROT UR STRIP.AUTO-MCNC: NEGATIVE MG/DL
PROTHROMBIN TIME: 14.8 SEC (ref 11.5–14.8)
RBC # BLD AUTO: 3.96 M/UL (ref 4.2–5.9)
SODIUM SERPL-SCNC: 132 MMOL/L (ref 136–145)
SP GR UR STRIP.AUTO: 1.01 (ref 1–1.03)
TROPONIN, HIGH SENSITIVITY: 7 NG/L (ref 0–22)
UA COMPLETE W REFLEX CULTURE PNL UR: NORMAL
UA DIPSTICK W REFLEX MICRO PNL UR: NORMAL
URN SPEC COLLECT METH UR: NORMAL
UROBILINOGEN UR STRIP-ACNC: 0.2 E.U./DL
WBC # BLD AUTO: 3.7 K/UL (ref 4–11)

## 2024-01-27 PROCEDURE — 99285 EMERGENCY DEPT VISIT HI MDM: CPT

## 2024-01-27 PROCEDURE — 2060000000 HC ICU INTERMEDIATE R&B

## 2024-01-27 PROCEDURE — 96375 TX/PRO/DX INJ NEW DRUG ADDON: CPT

## 2024-01-27 PROCEDURE — 96366 THER/PROPH/DIAG IV INF ADDON: CPT

## 2024-01-27 PROCEDURE — 93010 ELECTROCARDIOGRAM REPORT: CPT | Performed by: INTERNAL MEDICINE

## 2024-01-27 PROCEDURE — 74176 CT ABD & PELVIS W/O CONTRAST: CPT

## 2024-01-27 PROCEDURE — 2500000003 HC RX 250 WO HCPCS: Performed by: EMERGENCY MEDICINE

## 2024-01-27 PROCEDURE — 83605 ASSAY OF LACTIC ACID: CPT

## 2024-01-27 PROCEDURE — C9113 INJ PANTOPRAZOLE SODIUM, VIA: HCPCS | Performed by: EMERGENCY MEDICINE

## 2024-01-27 PROCEDURE — 6360000002 HC RX W HCPCS: Performed by: EMERGENCY MEDICINE

## 2024-01-27 PROCEDURE — 36415 COLL VENOUS BLD VENIPUNCTURE: CPT

## 2024-01-27 PROCEDURE — 96372 THER/PROPH/DIAG INJ SC/IM: CPT

## 2024-01-27 PROCEDURE — 80053 COMPREHEN METABOLIC PANEL: CPT

## 2024-01-27 PROCEDURE — 81003 URINALYSIS AUTO W/O SCOPE: CPT

## 2024-01-27 PROCEDURE — 71045 X-RAY EXAM CHEST 1 VIEW: CPT

## 2024-01-27 PROCEDURE — 6360000002 HC RX W HCPCS: Performed by: STUDENT IN AN ORGANIZED HEALTH CARE EDUCATION/TRAINING PROGRAM

## 2024-01-27 PROCEDURE — 2500000003 HC RX 250 WO HCPCS: Performed by: STUDENT IN AN ORGANIZED HEALTH CARE EDUCATION/TRAINING PROGRAM

## 2024-01-27 PROCEDURE — 83690 ASSAY OF LIPASE: CPT

## 2024-01-27 PROCEDURE — 93005 ELECTROCARDIOGRAM TRACING: CPT | Performed by: EMERGENCY MEDICINE

## 2024-01-27 PROCEDURE — 82140 ASSAY OF AMMONIA: CPT

## 2024-01-27 PROCEDURE — 2580000003 HC RX 258: Performed by: EMERGENCY MEDICINE

## 2024-01-27 PROCEDURE — 82077 ASSAY SPEC XCP UR&BREATH IA: CPT

## 2024-01-27 PROCEDURE — A4216 STERILE WATER/SALINE, 10 ML: HCPCS | Performed by: STUDENT IN AN ORGANIZED HEALTH CARE EDUCATION/TRAINING PROGRAM

## 2024-01-27 PROCEDURE — 80307 DRUG TEST PRSMV CHEM ANLYZR: CPT

## 2024-01-27 PROCEDURE — 6370000000 HC RX 637 (ALT 250 FOR IP): Performed by: STUDENT IN AN ORGANIZED HEALTH CARE EDUCATION/TRAINING PROGRAM

## 2024-01-27 PROCEDURE — 2580000003 HC RX 258: Performed by: STUDENT IN AN ORGANIZED HEALTH CARE EDUCATION/TRAINING PROGRAM

## 2024-01-27 PROCEDURE — 84484 ASSAY OF TROPONIN QUANT: CPT

## 2024-01-27 PROCEDURE — 96365 THER/PROPH/DIAG IV INF INIT: CPT

## 2024-01-27 PROCEDURE — 85025 COMPLETE CBC W/AUTO DIFF WBC: CPT

## 2024-01-27 PROCEDURE — 85610 PROTHROMBIN TIME: CPT

## 2024-01-27 RX ORDER — ACETAMINOPHEN 325 MG/1
650 TABLET ORAL EVERY 6 HOURS PRN
Status: DISCONTINUED | OUTPATIENT
Start: 2024-01-27 | End: 2024-01-29 | Stop reason: HOSPADM

## 2024-01-27 RX ORDER — POTASSIUM CHLORIDE 20 MEQ/1
40 TABLET, EXTENDED RELEASE ORAL PRN
Status: DISCONTINUED | OUTPATIENT
Start: 2024-01-27 | End: 2024-01-29 | Stop reason: HOSPADM

## 2024-01-27 RX ORDER — SODIUM CHLORIDE 0.9 % (FLUSH) 0.9 %
5-40 SYRINGE (ML) INJECTION EVERY 12 HOURS SCHEDULED
Status: DISCONTINUED | OUTPATIENT
Start: 2024-01-27 | End: 2024-01-29 | Stop reason: HOSPADM

## 2024-01-27 RX ORDER — ENOXAPARIN SODIUM 100 MG/ML
40 INJECTION SUBCUTANEOUS NIGHTLY
Status: DISCONTINUED | OUTPATIENT
Start: 2024-01-27 | End: 2024-01-29 | Stop reason: HOSPADM

## 2024-01-27 RX ORDER — LORAZEPAM 1 MG/1
2 TABLET ORAL
Status: DISCONTINUED | OUTPATIENT
Start: 2024-01-27 | End: 2024-01-27 | Stop reason: ALTCHOICE

## 2024-01-27 RX ORDER — LORAZEPAM 1 MG/1
4 TABLET ORAL
Status: DISCONTINUED | OUTPATIENT
Start: 2024-01-27 | End: 2024-01-27 | Stop reason: ALTCHOICE

## 2024-01-27 RX ORDER — SODIUM CHLORIDE 9 MG/ML
INJECTION, SOLUTION INTRAVENOUS PRN
Status: DISCONTINUED | OUTPATIENT
Start: 2024-01-27 | End: 2024-01-29 | Stop reason: HOSPADM

## 2024-01-27 RX ORDER — SODIUM CHLORIDE 0.9 % (FLUSH) 0.9 %
5-40 SYRINGE (ML) INJECTION PRN
Status: DISCONTINUED | OUTPATIENT
Start: 2024-01-27 | End: 2024-01-29 | Stop reason: HOSPADM

## 2024-01-27 RX ORDER — DIAZEPAM 5 MG/1
10 TABLET ORAL
Status: DISCONTINUED | OUTPATIENT
Start: 2024-01-27 | End: 2024-01-29 | Stop reason: HOSPADM

## 2024-01-27 RX ORDER — AMLODIPINE BESYLATE 5 MG/1
5 TABLET ORAL DAILY
Status: DISCONTINUED | OUTPATIENT
Start: 2024-01-27 | End: 2024-01-29 | Stop reason: HOSPADM

## 2024-01-27 RX ORDER — LORAZEPAM 1 MG/1
1 TABLET ORAL
Status: DISCONTINUED | OUTPATIENT
Start: 2024-01-27 | End: 2024-01-27 | Stop reason: ALTCHOICE

## 2024-01-27 RX ORDER — PANTOPRAZOLE SODIUM 40 MG/10ML
80 INJECTION, POWDER, LYOPHILIZED, FOR SOLUTION INTRAVENOUS ONCE
Status: COMPLETED | OUTPATIENT
Start: 2024-01-27 | End: 2024-01-27

## 2024-01-27 RX ORDER — 0.9 % SODIUM CHLORIDE 0.9 %
1000 INTRAVENOUS SOLUTION INTRAVENOUS ONCE
Status: COMPLETED | OUTPATIENT
Start: 2024-01-27 | End: 2024-01-27

## 2024-01-27 RX ORDER — ONDANSETRON 4 MG/1
4 TABLET, ORALLY DISINTEGRATING ORAL EVERY 8 HOURS PRN
Status: CANCELLED | OUTPATIENT
Start: 2024-01-27

## 2024-01-27 RX ORDER — DIAZEPAM 5 MG/ML
5 INJECTION, SOLUTION INTRAMUSCULAR; INTRAVENOUS
Status: DISCONTINUED | OUTPATIENT
Start: 2024-01-27 | End: 2024-01-29 | Stop reason: HOSPADM

## 2024-01-27 RX ORDER — DIAZEPAM 5 MG/1
15 TABLET ORAL
Status: DISCONTINUED | OUTPATIENT
Start: 2024-01-27 | End: 2024-01-29 | Stop reason: HOSPADM

## 2024-01-27 RX ORDER — ONDANSETRON 2 MG/ML
4 INJECTION INTRAMUSCULAR; INTRAVENOUS
Status: DISCONTINUED | OUTPATIENT
Start: 2024-01-27 | End: 2024-01-27

## 2024-01-27 RX ORDER — DIAZEPAM 5 MG/1
5 TABLET ORAL
Status: DISCONTINUED | OUTPATIENT
Start: 2024-01-27 | End: 2024-01-29 | Stop reason: HOSPADM

## 2024-01-27 RX ORDER — DIAZEPAM 5 MG/ML
10 INJECTION, SOLUTION INTRAMUSCULAR; INTRAVENOUS
Status: DISCONTINUED | OUTPATIENT
Start: 2024-01-27 | End: 2024-01-29 | Stop reason: HOSPADM

## 2024-01-27 RX ORDER — POLYETHYLENE GLYCOL 3350 17 G/17G
17 POWDER, FOR SOLUTION ORAL DAILY PRN
Status: DISCONTINUED | OUTPATIENT
Start: 2024-01-27 | End: 2024-01-29 | Stop reason: HOSPADM

## 2024-01-27 RX ORDER — MAGNESIUM SULFATE IN WATER 40 MG/ML
2000 INJECTION, SOLUTION INTRAVENOUS PRN
Status: DISCONTINUED | OUTPATIENT
Start: 2024-01-27 | End: 2024-01-29 | Stop reason: HOSPADM

## 2024-01-27 RX ORDER — DIAZEPAM 5 MG/ML
15 INJECTION, SOLUTION INTRAMUSCULAR; INTRAVENOUS
Status: DISCONTINUED | OUTPATIENT
Start: 2024-01-27 | End: 2024-01-29 | Stop reason: HOSPADM

## 2024-01-27 RX ORDER — LORAZEPAM 1 MG/1
3 TABLET ORAL
Status: DISCONTINUED | OUTPATIENT
Start: 2024-01-27 | End: 2024-01-27 | Stop reason: ALTCHOICE

## 2024-01-27 RX ORDER — ONDANSETRON 2 MG/ML
4 INJECTION INTRAMUSCULAR; INTRAVENOUS EVERY 6 HOURS PRN
Status: CANCELLED | OUTPATIENT
Start: 2024-01-27

## 2024-01-27 RX ORDER — GAUZE BANDAGE 2" X 2"
100 BANDAGE TOPICAL DAILY
Status: DISCONTINUED | OUTPATIENT
Start: 2024-01-28 | End: 2024-01-29 | Stop reason: HOSPADM

## 2024-01-27 RX ORDER — DICYCLOMINE HYDROCHLORIDE 10 MG/ML
20 INJECTION INTRAMUSCULAR ONCE
Status: COMPLETED | OUTPATIENT
Start: 2024-01-27 | End: 2024-01-27

## 2024-01-27 RX ORDER — LABETALOL HYDROCHLORIDE 5 MG/ML
5 INJECTION, SOLUTION INTRAVENOUS EVERY 6 HOURS PRN
Status: DISCONTINUED | OUTPATIENT
Start: 2024-01-27 | End: 2024-01-29 | Stop reason: HOSPADM

## 2024-01-27 RX ORDER — MIDAZOLAM HYDROCHLORIDE 2 MG/2ML
2 INJECTION, SOLUTION INTRAMUSCULAR; INTRAVENOUS ONCE
Status: COMPLETED | OUTPATIENT
Start: 2024-01-27 | End: 2024-01-27

## 2024-01-27 RX ORDER — DIAZEPAM 5 MG/ML
20 INJECTION, SOLUTION INTRAMUSCULAR; INTRAVENOUS
Status: DISCONTINUED | OUTPATIENT
Start: 2024-01-27 | End: 2024-01-29 | Stop reason: HOSPADM

## 2024-01-27 RX ORDER — DIAZEPAM 5 MG/1
20 TABLET ORAL
Status: DISCONTINUED | OUTPATIENT
Start: 2024-01-27 | End: 2024-01-29 | Stop reason: HOSPADM

## 2024-01-27 RX ORDER — POTASSIUM CHLORIDE 7.45 MG/ML
10 INJECTION INTRAVENOUS PRN
Status: DISCONTINUED | OUTPATIENT
Start: 2024-01-27 | End: 2024-01-29 | Stop reason: HOSPADM

## 2024-01-27 RX ORDER — ACETAMINOPHEN 650 MG/1
650 SUPPOSITORY RECTAL EVERY 6 HOURS PRN
Status: DISCONTINUED | OUTPATIENT
Start: 2024-01-27 | End: 2024-01-29 | Stop reason: HOSPADM

## 2024-01-27 RX ADMIN — MIDAZOLAM 2 MG: 1 INJECTION INTRAMUSCULAR; INTRAVENOUS at 05:15

## 2024-01-27 RX ADMIN — DIAZEPAM 5 MG: 5 INJECTION, SOLUTION INTRAMUSCULAR; INTRAVENOUS at 22:52

## 2024-01-27 RX ADMIN — ENOXAPARIN SODIUM 40 MG: 100 INJECTION SUBCUTANEOUS at 22:43

## 2024-01-27 RX ADMIN — DICYCLOMINE HYDROCHLORIDE 20 MG: 10 INJECTION, SOLUTION INTRAMUSCULAR at 04:17

## 2024-01-27 RX ADMIN — DIAZEPAM 20 MG: 5 INJECTION, SOLUTION INTRAMUSCULAR; INTRAVENOUS at 10:38

## 2024-01-27 RX ADMIN — THIAMINE HYDROCHLORIDE: 100 INJECTION, SOLUTION INTRAMUSCULAR; INTRAVENOUS at 04:30

## 2024-01-27 RX ADMIN — ALUMINUM HYDROXIDE, MAGNESIUM HYDROXIDE, AND SIMETHICONE: 200; 200; 20 SUSPENSION ORAL at 18:41

## 2024-01-27 RX ADMIN — Medication 10 ML: at 14:22

## 2024-01-27 RX ADMIN — FAMOTIDINE 20 MG: 10 INJECTION, SOLUTION INTRAVENOUS at 22:43

## 2024-01-27 RX ADMIN — PANTOPRAZOLE SODIUM 80 MG: 40 INJECTION, POWDER, FOR SOLUTION INTRAVENOUS at 04:20

## 2024-01-27 RX ADMIN — SODIUM CHLORIDE 1000 ML: 9 INJECTION, SOLUTION INTRAVENOUS at 04:15

## 2024-01-27 RX ADMIN — AMLODIPINE BESYLATE 5 MG: 5 TABLET ORAL at 18:40

## 2024-01-27 RX ADMIN — Medication 10 ML: at 22:44

## 2024-01-27 RX ADMIN — FAMOTIDINE 20 MG: 10 INJECTION, SOLUTION INTRAVENOUS at 14:14

## 2024-01-27 RX ADMIN — ONDANSETRON 4 MG: 2 INJECTION INTRAMUSCULAR; INTRAVENOUS at 04:17

## 2024-01-27 RX ADMIN — DIAZEPAM 5 MG: 5 INJECTION, SOLUTION INTRAMUSCULAR; INTRAVENOUS at 16:31

## 2024-01-27 ASSESSMENT — PAIN SCALES - GENERAL
PAINLEVEL_OUTOF10: 0
PAINLEVEL_OUTOF10: 6

## 2024-01-27 ASSESSMENT — PAIN - FUNCTIONAL ASSESSMENT
PAIN_FUNCTIONAL_ASSESSMENT: 0-10
PAIN_FUNCTIONAL_ASSESSMENT: 0-10

## 2024-01-27 NOTE — ACP (ADVANCE CARE PLANNING)
Advanced Care Planning Note.    Purpose of Encounter: Advanced care planning in light of abdominal pain  Parties In Attendance: Patient  Decisional Capacity: Yes  Subjective: Patient has abdominal pain  Objective: Cr 0.6  Goals of Care Determination: Patient wants full support  Plan:  IVF, CLD, GI cocktail, PPI  Code Status: Full   Time spent on Advanced care Plannin minutes  Advanced Care Planning Documents: Completed advanced directives on chart, Gunner, friend, is the POA.    Win Marcial MD  2024 5:19 PM

## 2024-01-27 NOTE — ED NOTES
ED TO INPATIENT SBAR HANDOFF    Patient Name: Troy Benitez   :  1975  48 y.o.   MRN:  [id number]  Preferred Name  Troy  ED Room #:  ED-0017/17  Family/Caregiver Present no   Restraints no   Sitter no   Sepsis Risk Score Sepsis Risk Score: 1.77    Situation  Code Status: Prior is Full code.    Allergies: Patient has no known allergies.  Weight: Patient Vitals for the past 96 hrs (Last 3 readings):   Weight   24 0353 59 kg (130 lb)     Arrived from: home  Chief Complaint:   Chief Complaint   Patient presents with    Abdominal Pain     Abdominal pain since yesterday morning, pt reports he is [facility] Problem/Diagnosis:  Active Problems:    * No active hospital problems. *  Resolved Problems:    * No resolved hospital problems. *    Imaging:   CT ABDOMEN PELVIS WO CONTRAST Additional Contrast? None   Preliminary Result   1. No acute intra-abdominal or pelvic abnormality.   2. Mild hepatic steatosis.         XR CHEST PORTABLE   Final Result   No acute cardiopulmonary process.           Abnormal labs:   Abnormal Labs Reviewed   CBC WITH AUTO DIFFERENTIAL - Abnormal; Notable for the following components:       Result Value    WBC 3.7 (*)     RBC 3.96 (*)     Hemoglobin 13.1 (*)     Hematocrit 38.1 (*)     All other components within normal limits   COMPREHENSIVE METABOLIC PANEL W/ REFLEX TO MG FOR LOW K - Abnormal; Notable for the following components:    Sodium 132 (*)     Chloride 94 (*)     Glucose 112 (*)     BUN 6 (*)     Creatinine 0.6 (*)     Total Protein 8.4 (*)     Albumin/Globulin Ratio 0.9 (*)     ALT 88 (*)      (*)     All other components within normal limits   URINE DRUG SCREEN - Abnormal; Notable for the following components:    Barbiturate Screen, Ur POSITIVE (*)     All other components within normal limits   LACTATE, SEPSIS - Abnormal; Notable for the following components:    Lactic Acid, Sepsis 2.9 (*)     All other components within normal limits     Critical values:

## 2024-01-27 NOTE — ED PROVIDER NOTES
He was later given a banana bag and IV Versed as well.  Seizure precautions are in place although he has not had seizure recently.  CT abdomen pelvis obtained without any acute surgical infectious or inflammatory process.  No infection identified today in his workup.  He does seem to have some delirium and confusion.  No trauma so I do not feel a CT of the head is warranted at this time as I do not suspect an intracranial bleed.  He has a nonfocal exam so I do not suspect stroke.  Lactate is elevated but I do not suspect that this is sepsis related in the absence of other findings of sepsis or source of infection suspected or identified.  Additionally hyperammonemia was considered but his ammonia level is normal today so hepatic encephalopathy seems a little less likely although he has had this in the past.      Is this patient to be included in the SEP-1 Core Measure?  No   Exclusion criteria - the patient is NOT to be included for SEP-1 Core Measure due to:  Infection is not suspected      Consults:  Hospitalist service consulted for admission    History obtained from: Patient    Pertinent social determinants of health: Psychosocial condition (including substance abuse or mental illness)    Chronic conditions potentially affecting care: None applicable    Review of other records:  Inpatient notes from outside hospital admission from 1/11/24-1/15/24 at [facility] for etoh related complications summarized in HPI    Reassessment:  See MDM for details of medications given and reassessment    During the patient's ED course, the patient was given:  Medications   ondansetron (ZOFRAN) injection 4 mg (4 mg IntraVENous Given 1/27/24 6733)   0.9 % sodium chloride IV bolus 1,000 mL (0 mLs IntraVENous Stopped 1/27/24 7909)   pantoprazole (PROTONIX) injection 80 mg (80 mg IntraVENous Given 1/27/24 8440)   dicyclomine (BENTYL) injection 20 mg (20 mg IntraMUSCular Given 1/27/24 9707)   sodium chloride 0.9 % 1,000 mL with folic

## 2024-01-27 NOTE — H&P
CC : abdominal pain    History Obtained From:  patient    HISTORY OF PRESENT ILLNESS:    Patient is a 47-year-old male with past medical history of EtOH abuse, EtOH withdrawal seizure who presented with abdominal pain.  Patient endorsed that his last drink was over 2 weeks ago.  He claims that at home he started to develop abdominal pain.  He claims it is sharp in nature.  He claims it is not related to food or drink.  He claims the he had poor p.o. intake.  He claims that he used to drink as it helps him eat.  Of note, patient was discharged on 1/14/2024 from [facility] with EtOH withdrawal seizures requiring phenobarbital and Keppra.  Patient denies any fever, chills, vomiting, chest pain, shortness of breath, dysuria, urine urgency or frequency.    Plan:     -CIWA protocol  -Librium 50 mg 3 times daily  -IV fluid hydration  -Orthostats  -Fecal leukocytes  -GI cocktail PRN  -Continue to monitor off antibiotics  -Clear liquid diet  -Continue Keppra  -Start amlodipine 5 mg daily for BP control    Assessment:   Troy Benitez is a 48 y.o. male with PMH of EtOH abuse, EtOH withdrawal seizure who was admitted with abdominal pain    Abdominal pain  Etiology unclear  Patient claims that the pain started yesterday.  He describes it as a sharp pain.  No alleviating or aggravating symptoms.  Generalized abdominal pain without any radiation.  Patient endorses that he has had poor p.o. intake.  He claims that he has not been drinking any alcohol.  He denies any overt sources of bleeding.  He endorsed that the pain is not related to food.  He did endorse some nausea.  CT abdomen pelvis was negative for any acute pathology.    ?  EtOH withdrawal  Patient endorsed that he has not drank in the past 2 weeks.  His ethanol level was negative.  Patient was shaky in the ED and appeared anxious.  Patient was hypertensive but not tachycardic.    EtOH withdrawal seizure  Patient was hospitalized at [facility] between 1/11 and 1/14

## 2024-01-28 LAB
ALBUMIN SERPL-MCNC: 4 G/DL (ref 3.4–5)
ALBUMIN/GLOB SERPL: 1 {RATIO} (ref 1.1–2.2)
ALP SERPL-CCNC: 70 U/L (ref 40–129)
ALT SERPL-CCNC: 80 U/L (ref 10–40)
ANION GAP SERPL CALCULATED.3IONS-SCNC: 9 MMOL/L (ref 3–16)
AST SERPL-CCNC: 115 U/L (ref 15–37)
BASOPHILS # BLD: 0.1 K/UL (ref 0–0.2)
BASOPHILS NFR BLD: 1.9 %
BILIRUB SERPL-MCNC: 0.8 MG/DL (ref 0–1)
BUN SERPL-MCNC: 3 MG/DL (ref 7–20)
CALCIUM SERPL-MCNC: 9.1 MG/DL (ref 8.3–10.6)
CHLORIDE SERPL-SCNC: 99 MMOL/L (ref 99–110)
CO2 SERPL-SCNC: 29 MMOL/L (ref 21–32)
CREAT SERPL-MCNC: 0.8 MG/DL (ref 0.9–1.3)
DEPRECATED RDW RBC AUTO: 13.4 % (ref 12.4–15.4)
EOSINOPHIL # BLD: 0.2 K/UL (ref 0–0.6)
EOSINOPHIL NFR BLD: 6.9 %
GFR SERPLBLD CREATININE-BSD FMLA CKD-EPI: >60 ML/MIN/{1.73_M2}
GLUCOSE SERPL-MCNC: 100 MG/DL (ref 70–99)
HCT VFR BLD AUTO: 39.8 % (ref 40.5–52.5)
HGB BLD-MCNC: 13.5 G/DL (ref 13.5–17.5)
LYMPHOCYTES # BLD: 1 K/UL (ref 1–5.1)
LYMPHOCYTES NFR BLD: 32.3 %
MAGNESIUM SERPL-MCNC: 1.5 MG/DL (ref 1.8–2.4)
MCH RBC QN AUTO: 32.9 PG (ref 26–34)
MCHC RBC AUTO-ENTMCNC: 33.9 G/DL (ref 31–36)
MCV RBC AUTO: 97 FL (ref 80–100)
MONOCYTES # BLD: 0.4 K/UL (ref 0–1.3)
MONOCYTES NFR BLD: 12.1 %
NEUTROPHILS # BLD: 1.4 K/UL (ref 1.7–7.7)
NEUTROPHILS NFR BLD: 46.8 %
PLATELET # BLD AUTO: 169 K/UL (ref 135–450)
PMV BLD AUTO: 7.8 FL (ref 5–10.5)
POTASSIUM SERPL-SCNC: 3.6 MMOL/L (ref 3.5–5.1)
PROT SERPL-MCNC: 7.9 G/DL (ref 6.4–8.2)
RBC # BLD AUTO: 4.1 M/UL (ref 4.2–5.9)
SODIUM SERPL-SCNC: 137 MMOL/L (ref 136–145)
WBC # BLD AUTO: 3 K/UL (ref 4–11)

## 2024-01-28 PROCEDURE — 85025 COMPLETE CBC W/AUTO DIFF WBC: CPT

## 2024-01-28 PROCEDURE — 36415 COLL VENOUS BLD VENIPUNCTURE: CPT

## 2024-01-28 PROCEDURE — 80053 COMPREHEN METABOLIC PANEL: CPT

## 2024-01-28 PROCEDURE — 2580000003 HC RX 258: Performed by: STUDENT IN AN ORGANIZED HEALTH CARE EDUCATION/TRAINING PROGRAM

## 2024-01-28 PROCEDURE — 2500000003 HC RX 250 WO HCPCS: Performed by: STUDENT IN AN ORGANIZED HEALTH CARE EDUCATION/TRAINING PROGRAM

## 2024-01-28 PROCEDURE — 6370000000 HC RX 637 (ALT 250 FOR IP): Performed by: STUDENT IN AN ORGANIZED HEALTH CARE EDUCATION/TRAINING PROGRAM

## 2024-01-28 PROCEDURE — 2060000000 HC ICU INTERMEDIATE R&B

## 2024-01-28 PROCEDURE — A4216 STERILE WATER/SALINE, 10 ML: HCPCS | Performed by: STUDENT IN AN ORGANIZED HEALTH CARE EDUCATION/TRAINING PROGRAM

## 2024-01-28 PROCEDURE — 6360000002 HC RX W HCPCS: Performed by: STUDENT IN AN ORGANIZED HEALTH CARE EDUCATION/TRAINING PROGRAM

## 2024-01-28 PROCEDURE — 83735 ASSAY OF MAGNESIUM: CPT

## 2024-01-28 RX ORDER — CHLORDIAZEPOXIDE HYDROCHLORIDE 25 MG/1
50 CAPSULE, GELATIN COATED ORAL 3 TIMES DAILY
Status: DISCONTINUED | OUTPATIENT
Start: 2024-01-28 | End: 2024-01-28

## 2024-01-28 RX ORDER — MAGNESIUM SULFATE IN WATER 40 MG/ML
4000 INJECTION, SOLUTION INTRAVENOUS ONCE
Status: COMPLETED | OUTPATIENT
Start: 2024-01-28 | End: 2024-01-28

## 2024-01-28 RX ORDER — CHLORDIAZEPOXIDE HYDROCHLORIDE 25 MG/1
50 CAPSULE, GELATIN COATED ORAL 3 TIMES DAILY
Status: DISCONTINUED | OUTPATIENT
Start: 2024-01-28 | End: 2024-01-29 | Stop reason: HOSPADM

## 2024-01-28 RX ADMIN — CHLORDIAZEPOXIDE HYDROCHLORIDE 50 MG: 25 CAPSULE ORAL at 21:15

## 2024-01-28 RX ADMIN — CHLORDIAZEPOXIDE HYDROCHLORIDE 50 MG: 25 CAPSULE ORAL at 08:56

## 2024-01-28 RX ADMIN — FAMOTIDINE 20 MG: 10 INJECTION, SOLUTION INTRAVENOUS at 21:16

## 2024-01-28 RX ADMIN — Medication 100 MG: at 08:56

## 2024-01-28 RX ADMIN — FAMOTIDINE 20 MG: 10 INJECTION, SOLUTION INTRAVENOUS at 08:56

## 2024-01-28 RX ADMIN — DIAZEPAM 5 MG: 5 TABLET ORAL at 00:06

## 2024-01-28 RX ADMIN — CHLORDIAZEPOXIDE HYDROCHLORIDE 50 MG: 25 CAPSULE ORAL at 14:10

## 2024-01-28 RX ADMIN — MAGNESIUM SULFATE HEPTAHYDRATE 4000 MG: 40 INJECTION, SOLUTION INTRAVENOUS at 09:09

## 2024-01-28 RX ADMIN — Medication 10 ML: at 21:16

## 2024-01-28 RX ADMIN — AMLODIPINE BESYLATE 5 MG: 5 TABLET ORAL at 08:56

## 2024-01-28 RX ADMIN — Medication 10 ML: at 09:11

## 2024-01-28 RX ADMIN — ENOXAPARIN SODIUM 40 MG: 100 INJECTION SUBCUTANEOUS at 21:15

## 2024-01-29 VITALS
HEIGHT: 61 IN | TEMPERATURE: 97.7 F | OXYGEN SATURATION: 99 % | SYSTOLIC BLOOD PRESSURE: 120 MMHG | BODY MASS INDEX: 26.81 KG/M2 | DIASTOLIC BLOOD PRESSURE: 76 MMHG | RESPIRATION RATE: 18 BRPM | WEIGHT: 142 LBS | HEART RATE: 70 BPM

## 2024-01-29 LAB
ALBUMIN SERPL-MCNC: 3.7 G/DL (ref 3.4–5)
ALBUMIN/GLOB SERPL: 1.1 {RATIO} (ref 1.1–2.2)
ALP SERPL-CCNC: 87 U/L (ref 40–129)
ALT SERPL-CCNC: 64 U/L (ref 10–40)
ANION GAP SERPL CALCULATED.3IONS-SCNC: 11 MMOL/L (ref 3–16)
AST SERPL-CCNC: 92 U/L (ref 15–37)
BASOPHILS # BLD: 0 K/UL (ref 0–0.2)
BASOPHILS NFR BLD: 1.7 %
BILIRUB SERPL-MCNC: 0.4 MG/DL (ref 0–1)
BUN SERPL-MCNC: 6 MG/DL (ref 7–20)
CALCIUM SERPL-MCNC: 8.7 MG/DL (ref 8.3–10.6)
CHLORIDE SERPL-SCNC: 100 MMOL/L (ref 99–110)
CO2 SERPL-SCNC: 27 MMOL/L (ref 21–32)
CREAT SERPL-MCNC: 0.8 MG/DL (ref 0.9–1.3)
DEPRECATED RDW RBC AUTO: 13.6 % (ref 12.4–15.4)
EOSINOPHIL # BLD: 0.2 K/UL (ref 0–0.6)
EOSINOPHIL NFR BLD: 7.6 %
GFR SERPLBLD CREATININE-BSD FMLA CKD-EPI: >60 ML/MIN/{1.73_M2}
GLUCOSE BLD-MCNC: 169 MG/DL (ref 70–99)
GLUCOSE SERPL-MCNC: 125 MG/DL (ref 70–99)
HCT VFR BLD AUTO: 36.7 % (ref 40.5–52.5)
HGB BLD-MCNC: 12.5 G/DL (ref 13.5–17.5)
LYMPHOCYTES # BLD: 0.8 K/UL (ref 1–5.1)
LYMPHOCYTES NFR BLD: 30.9 %
MAGNESIUM SERPL-MCNC: 1.8 MG/DL (ref 1.8–2.4)
MCH RBC QN AUTO: 33 PG (ref 26–34)
MCHC RBC AUTO-ENTMCNC: 33.9 G/DL (ref 31–36)
MCV RBC AUTO: 97.2 FL (ref 80–100)
MONOCYTES # BLD: 0.2 K/UL (ref 0–1.3)
MONOCYTES NFR BLD: 8.8 %
NEUTROPHILS # BLD: 1.3 K/UL (ref 1.7–7.7)
NEUTROPHILS NFR BLD: 51 %
PERFORMED ON: ABNORMAL
PLATELET # BLD AUTO: 141 K/UL (ref 135–450)
PMV BLD AUTO: 8.4 FL (ref 5–10.5)
POTASSIUM SERPL-SCNC: 3.8 MMOL/L (ref 3.5–5.1)
PROT SERPL-MCNC: 7.2 G/DL (ref 6.4–8.2)
RBC # BLD AUTO: 3.78 M/UL (ref 4.2–5.9)
REPORT: NORMAL
RESP PATH DNA+RNA PNL NPH NAA+NON-PROBE: NORMAL
SODIUM SERPL-SCNC: 138 MMOL/L (ref 136–145)
WBC # BLD AUTO: 2.6 K/UL (ref 4–11)

## 2024-01-29 PROCEDURE — 36415 COLL VENOUS BLD VENIPUNCTURE: CPT

## 2024-01-29 PROCEDURE — 97530 THERAPEUTIC ACTIVITIES: CPT

## 2024-01-29 PROCEDURE — 85025 COMPLETE CBC W/AUTO DIFF WBC: CPT

## 2024-01-29 PROCEDURE — 6370000000 HC RX 637 (ALT 250 FOR IP): Performed by: STUDENT IN AN ORGANIZED HEALTH CARE EDUCATION/TRAINING PROGRAM

## 2024-01-29 PROCEDURE — 2500000003 HC RX 250 WO HCPCS: Performed by: STUDENT IN AN ORGANIZED HEALTH CARE EDUCATION/TRAINING PROGRAM

## 2024-01-29 PROCEDURE — 97535 SELF CARE MNGMENT TRAINING: CPT

## 2024-01-29 PROCEDURE — 2580000003 HC RX 258: Performed by: STUDENT IN AN ORGANIZED HEALTH CARE EDUCATION/TRAINING PROGRAM

## 2024-01-29 PROCEDURE — 0202U NFCT DS 22 TRGT SARS-COV-2: CPT

## 2024-01-29 PROCEDURE — 83735 ASSAY OF MAGNESIUM: CPT

## 2024-01-29 PROCEDURE — 97166 OT EVAL MOD COMPLEX 45 MIN: CPT

## 2024-01-29 PROCEDURE — A4216 STERILE WATER/SALINE, 10 ML: HCPCS | Performed by: STUDENT IN AN ORGANIZED HEALTH CARE EDUCATION/TRAINING PROGRAM

## 2024-01-29 PROCEDURE — 97116 GAIT TRAINING THERAPY: CPT

## 2024-01-29 PROCEDURE — 97162 PT EVAL MOD COMPLEX 30 MIN: CPT

## 2024-01-29 PROCEDURE — 80053 COMPREHEN METABOLIC PANEL: CPT

## 2024-01-29 RX ORDER — PROCHLORPERAZINE EDISYLATE 5 MG/ML
10 INJECTION INTRAMUSCULAR; INTRAVENOUS EVERY 6 HOURS PRN
Status: DISCONTINUED | OUTPATIENT
Start: 2024-01-29 | End: 2024-01-29 | Stop reason: HOSPADM

## 2024-01-29 RX ORDER — LEVETIRACETAM 500 MG/1
500 TABLET ORAL 2 TIMES DAILY
Status: DISCONTINUED | OUTPATIENT
Start: 2024-01-29 | End: 2024-01-29

## 2024-01-29 RX ORDER — LEVETIRACETAM 500 MG/1
1000 TABLET ORAL 2 TIMES DAILY
Status: DISCONTINUED | OUTPATIENT
Start: 2024-01-29 | End: 2024-01-29 | Stop reason: HOSPADM

## 2024-01-29 RX ORDER — SODIUM CHLORIDE, SODIUM LACTATE, POTASSIUM CHLORIDE, CALCIUM CHLORIDE 600; 310; 30; 20 MG/100ML; MG/100ML; MG/100ML; MG/100ML
INJECTION, SOLUTION INTRAVENOUS CONTINUOUS
Status: DISCONTINUED | OUTPATIENT
Start: 2024-01-29 | End: 2024-01-29 | Stop reason: HOSPADM

## 2024-01-29 RX ORDER — CHLORDIAZEPOXIDE HYDROCHLORIDE 25 MG/1
50 CAPSULE, GELATIN COATED ORAL 3 TIMES DAILY
Status: DISCONTINUED | OUTPATIENT
Start: 2024-01-29 | End: 2024-01-29

## 2024-01-29 RX ORDER — THIAMINE MONONITRATE (VIT B1) 100 MG
100 TABLET ORAL DAILY
Qty: 90 TABLET | Refills: 0 | Status: SHIPPED | OUTPATIENT
Start: 2024-01-30 | End: 2024-04-29

## 2024-01-29 RX ORDER — DICYCLOMINE HYDROCHLORIDE 10 MG/1
10 CAPSULE ORAL 4 TIMES DAILY
Status: DISCONTINUED | OUTPATIENT
Start: 2024-01-29 | End: 2024-01-29 | Stop reason: HOSPADM

## 2024-01-29 RX ORDER — AMLODIPINE BESYLATE 5 MG/1
5 TABLET ORAL DAILY
Qty: 90 TABLET | Refills: 0 | Status: SHIPPED | OUTPATIENT
Start: 2024-01-30 | End: 2024-04-29

## 2024-01-29 RX ADMIN — LEVETIRACETAM 1000 MG: 500 TABLET, FILM COATED ORAL at 03:18

## 2024-01-29 RX ADMIN — CHLORDIAZEPOXIDE HYDROCHLORIDE 50 MG: 25 CAPSULE ORAL at 09:19

## 2024-01-29 RX ADMIN — CHLORDIAZEPOXIDE HYDROCHLORIDE 50 MG: 25 CAPSULE ORAL at 14:50

## 2024-01-29 RX ADMIN — FAMOTIDINE 20 MG: 10 INJECTION, SOLUTION INTRAVENOUS at 09:22

## 2024-01-29 RX ADMIN — DICYCLOMINE HYDROCHLORIDE 10 MG: 10 CAPSULE ORAL at 17:53

## 2024-01-29 RX ADMIN — DICYCLOMINE HYDROCHLORIDE 10 MG: 10 CAPSULE ORAL at 09:19

## 2024-01-29 RX ADMIN — Medication 10 ML: at 09:23

## 2024-01-29 RX ADMIN — Medication 100 MG: at 09:19

## 2024-01-29 RX ADMIN — SODIUM CHLORIDE, POTASSIUM CHLORIDE, SODIUM LACTATE AND CALCIUM CHLORIDE: 600; 310; 30; 20 INJECTION, SOLUTION INTRAVENOUS at 03:21

## 2024-01-29 RX ADMIN — DICYCLOMINE HYDROCHLORIDE 10 MG: 10 CAPSULE ORAL at 14:50

## 2024-01-29 RX ADMIN — AMLODIPINE BESYLATE 5 MG: 5 TABLET ORAL at 09:19

## 2024-01-29 RX ADMIN — LEVETIRACETAM 1000 MG: 500 TABLET, FILM COATED ORAL at 09:19

## 2024-01-29 ASSESSMENT — PAIN SCALES - GENERAL: PAINLEVEL_OUTOF10: 0

## 2024-01-29 NOTE — PLAN OF CARE
#[id number] used for communication with patient. Pt reports he speaks \"Akan\" and is from [place]. Pt speaks minimal English. Shift assessment complete. VSS. Pt is alert and oriented x 4. Evening medications administered per MAR. Call light within reach. Bed alarm engaged. Pt instructed to call for assistance. Pt verbalized understanding. Pt denied needs from this RN at this time.     Problem: Discharge Planning  Goal: Discharge to home or other facility with appropriate resources  Outcome: Progressing  Flowsheets (Taken 1/28/2024 1916)  Discharge to home or other facility with appropriate resources:   Identify barriers to discharge with patient and caregiver   Arrange for needed discharge resources and transportation as appropriate   Identify discharge learning needs (meds, wound care, etc)   Arrange for interpreters to assist at discharge as needed   Refer to discharge planning if patient needs post-hospital services based on physician order or complex needs related to functional status, cognitive ability or social support system     Problem: Pain  Goal: Verbalizes/displays adequate comfort level or baseline comfort level  Outcome: Progressing     Problem: Safety - Adult  Goal: Free from fall injury  Outcome: Progressing

## 2024-01-29 NOTE — DISCHARGE INSTRUCTIONS
Please call and make an appointment with your PCP at [facility] within a week and tell them your want madelin home visits  Please take all your medications as prescribed including any new ones on discharge    Your information:  Name: Troy Benitez  : 1975    Your Discharge Instructions    What to do after you leave the hospital:    Read, review and familiarize yourself with the information provided below and in a separate packet on   abdominal pain    Diet: Regular    Recommended activity:   as tolerated  Avoid strenuous activity until instructed by your physician to resume; balance rest with periods of light to normal activity.     If you experience any of the following: Unusual or inadequately controlled pain; unusual transient shortness of breath; recurrent or persistent nausea, heartburn, palpitations or lightheadedness; increased swelling; increased fatigue; fever >100; please follow up with @PCP@ [unfilled] or go to the Emergency Room.      Home Health/ Outpatient Services:   [facility]  [address] [place] [address]  Phone: [phone]  Appointment: 2024 @ 1:15PM  Dr. Rosa Rivera       Information obtained by:  By signing below, I understand and acknowledge receipt of the instructions indicated above, and I understand that if any problems occur once I leave the hospital I am to contact @PCP@.

## 2024-01-29 NOTE — PLAN OF CARE
Problem: Safety - Adult  Goal: Free from fall injury  1/29/2024 0950 by Emre Su, RN  Outcome: Progressing  Note: Pt remains free from falls. Safety precautions in place. Bed in lowest position, bed wheels locked, call light with in reach, bed alarm on, yellow blanket in place, fall risk wrist band on, SAFE outside of doorway. Will continue to monitor.  1/29/2024 0131 by Misty Flanagan, RN  Outcome: Progressing

## 2024-01-29 NOTE — CARE COORDINATION
Discharge Planning Note:    Referral called to Johanna with [facility], she will visit patient tomorrow.     Electronically signed by Pastora Hahn RN on 1/28/2024 at 1:27 PM    
Discharge Planning:     (CM) Patient has  D/C order in. CM was made aware that Patient has no insurance and needs a [facility] A[facility]. CM made the following Apt:      [facility]  [address] [place] [address]  Phone: [phone]  Appointment: February 5, 2024 @ 1:15PM  Dr. Rosa Rivera     Patient is recommended for ARU however Patient has no insurance and will not qualify for ARU or a SNF.     [facility] do madelni visits with the Patient. They can do these after Patient goes to his [facility] and then fax over the order to them [phone].     CM went to explain the above to Patient with Intepreter using session code 62925. Patient speaks Akan.  line unable to find an  at this time and asked CM to try again later.     Nurse, Mj took the paper CM typed for the Patient and advised he will inform him of everything when he goes over D/C with him.       Electronically signed by BIJAN Barrera on 1/29/2024 at 2:37 PM    
08-Nov-2021

## 2024-01-29 NOTE — DISCHARGE INSTR - COC
Status/Restrictions: { CC Weight Bearin}  Other Medical Equipment (for information only, NOT a DME order):  {EQUIPMENT:007624205}  Other Treatments: ***    Patient's personal belongings (please select all that are sent with patient):  {CHP DME Belongings:073402215}    RN SIGNATURE:  {Esignature:550794995}    CASE MANAGEMENT/SOCIAL WORK SECTION    Inpatient Status Date: 2024    Readmission Risk Assessment Score: 13%  Readmission Risk              Risk of Unplanned Readmission:  20           Discharging to Facility/ Agency     [facility]  [address] [place] [address]  Phone: [phone]  Appointment: 2024 @ 1:15PM  Dr. Rosa Rivera     / signature: Electronically signed by BIJAN Barrera on 2024 at 12:15 PM      PHYSICIAN SECTION    Prognosis: {Prognosis:6086219111}    Condition at Discharge: { Patient Condition:946460771}    Rehab Potential (if transferring to Rehab): {Prognosis:0311888602}    Recommended Labs or Other Treatments After Discharge: ***    Physician Certification: I certify the above information and transfer of Troy Benitez  is necessary for the continuing treatment of the diagnosis listed and that he requires {Admit to Appropriate Level of Care:35243} for {GREATER/LESS:933780213} 30 days.     Update Admission H&P: {CHP DME Changes in HandP:300431697}    PHYSICIAN SIGNATURE:  {Esignature:960709246}

## 2024-01-29 NOTE — DISCHARGE SUMMARY
V2.0  Discharge Summary    Name:  Troy Benitez /Age/Sex: 1975 (48 y.o. male)   Admit Date: 2024  Discharging Provider: Win Marcial MD   MRN & CSN:  [id number] & [id number] Attending:  Win Marcial MD       Brief HPI: Troy Benitez is a 48 y.o. male with PMH of EtOH abuse, EtOH withdrawal seizure who was admitted with abdominal pain. Patient's abdominal pain improved with PPI and GI cocktail. Patient had some unsteadiness in his gait and his PT/OT scores were sub optimal. Unfortunately, due to his lack of insurance, he could not go to the ARU, which was recommended. Patient will follow up with his PCP for madelin therapy to be ordered for hi,. Patient was managed with CIWA protocol and Librium and did not experience EtOH withdrawal.     Brief Problem Focused Hospital Course:     Abdominal pain  Etiology unclear  Patient claims that the pain started yesterday.  He describes it as a sharp pain.  No alleviating or aggravating symptoms.  Generalized abdominal pain without any radiation.  Patient endorses that he has had poor p.o. intake.  He claims that he has not been drinking any alcohol.  He denies any overt sources of bleeding.  He endorsed that the pain is not related to food.  He did endorse some nausea.  CT abdomen pelvis was negative for any acute pathology.     ?  EtOH withdrawal  Patient endorsed that he has not drank in the past 2 weeks.  His ethanol level was negative.  Patient was shaky in the ED and appeared anxious.  Patient was hypertensive but not tachycardic.     EtOH withdrawal seizure  Patient was hospitalized at [facility] between  and  for EtOH withdrawal and had a seizure. During that hospitalization was started on Keppra    The patient expressed appropriate understanding of, and agreement with the discharge recommendations, medications, and plan.     Consults this admission:  IP CONSULT TO SOCIAL WORK    Discharge Diagnosis:   Alcohol withdrawal, uncomplicated

## 2024-01-29 NOTE — PROGRESS NOTES
Quirino #[id number]  Session#[id number]    Jan 29, 2024 @ [id number]    Pt encouraged to use bathroom by this RN. Pt ambulated to toilet and had BM/urinated. Pt returned to bed without incident. Call light in reach. Bed alarm on. Seizure precautions in place. Camera in room. Pt spitting up small amount of blood, reports \"biting his tongue\" the other day.   
CLINICAL PHARMACY NOTE: MEDS TO BEDS    Total # of Prescriptions Filled: 2   The following medications were delivered to the patient:  AMLODIPINE BESYLATE 5MG  THIAMINE HCL 100MG    Additional Documentation:  Delivered to HUBER Barker = signed  Keysha Grady - Pharmacy Tech.   
Patient continues to wait on ride.   
Patient is waiting on ride. Patient stated that the friend he lives with will be picking him up.   
deficits, it is recommended that the patient have 3-5 sessions per week of Occupational Therapy at d/c to increase the patient's independence.  Please see assessment section for further patient specific details.    Despite an AM-PAC score higher than 17, Pt is unable to return home safely; he is a fall risk d/t being unsteady with functional mobility and ADLs. Also, he does not have support available to assist him at home.     If patient discharges prior to next session this note will serve as a discharge summary.  Please see below for the latest assessment towards goals.      DME Required For Discharge: DME to be determined at next level of care, shower chair with back    Precautions/Restrictions: high fall risk, seizure, Pt has bed mats in place  Weight Bearing Restrictions: no restrictions  [] Right Upper Extremity  [] Left Upper Extremity [] Right Lower Extremity  [] Left Lower Extremity     Required Braces/Orthotics: no braces required   [] Right  [] Left  Positional Restrictions:no positional restrictions    Pre-Admission Information    call attempted but unable to find an  for pt's language. Connection disconnected. PLOF information gathered from previous note on 1/11/24.  Lives With:  friend                  -   Type of Home: apartment  Home Layout: one level  Home Access: level entry, elevator  Bathroom Layout: tub/shower unit  Transfer Assistance: Independent without use of device  Ambulation Assistance:Independent without use of device  ADL Assistance: independent with all ADL's  IADL Assistance: independent with homemaking tasks  Active :        [] Yes                 [x] No  Hand Dominance: [] Left                 [x] Right  Current Employment: D/t language barrier unable to ascertain employment.  Hobbies:   Recent Falls: Yes, states fell last week, states knows when about to fall  Assistance Available Intermittent Supervision;Intermittent Physical Assistance  (friend works) 
99   CO2 25 29   BUN 6* 3*   CREATININE 0.6* 0.8*   GLUCOSE 112* 100*   CALCIUM 9.0 9.1   MG  --  1.50*   ANIONGAP 13 9     Hepatic:   Recent Labs     01/27/24 0359 01/28/24  0455   * 115*   ALT 88* 80*   BILITOT 0.5 0.8   PROT 8.4* 7.9   LABALBU 4.0 4.0   ALKPHOS 83 70     Troponin: No results for input(s): \"TROPONINI\" in the last 72 hours.  BNP: No results for input(s): \"BNP\" in the last 72 hours.  Lipids: No results for input(s): \"CHOL\", \"HDL\" in the last 72 hours.    Invalid input(s): \"LDLCALCU\", \"TRIGLYCERIDE\"  ABGs:  No results for input(s): \"PHART\", \"HJU4HYR\", \"PO2ART\", \"UHF7IVQ\", \"BEART\", \"THGBART\", \"M2CZRHVQ\", \"FOD5ZTA\" in the last 72 hours.    INR:   Recent Labs     01/27/24  0359   INR 1.16     Lactate: No results for input(s): \"LACTATE\" in the last 72 hours.  Cultures:  -----------------------------------------------------------------  RAD:   CT ABDOMEN PELVIS WO CONTRAST Additional Contrast? None   Final Result   1. No acute intra-abdominal or pelvic abnormality.   2. Mild hepatic steatosis.         XR CHEST PORTABLE   Final Result   No acute cardiopulmonary process.             Medications:     Scheduled Meds:   magnesium sulfate  4,000 mg IntraVENous Once    chlordiazePOXIDE  50 mg Oral TID    sodium chloride flush  5-40 mL IntraVENous 2 times per day    thiamine  100 mg Oral Daily    famotidine (PEPCID) injection  20 mg IntraVENous BID    enoxaparin  40 mg SubCUTAneous Nightly    amLODIPine  5 mg Oral Daily     Continuous Infusions:   sodium chloride       PRN Meds:sodium chloride flush, sodium chloride, potassium chloride **OR** potassium alternative oral replacement **OR** potassium chloride, magnesium sulfate, polyethylene glycol, acetaminophen **OR** acetaminophen, diazePAM **OR** diazePAM **OR** diazePAM **OR** diazePAM **OR** diazePAM **OR** diazePAM **OR** diazePAM **OR** diazePAM, labetalol, aluminum & magnesium hydroxide-simethicone (MAALOX) 30 mL, lidocaine viscous hcl (XYLOCAINE) 5 
v/c for keeping AD close, keeping upright posture, and big steps.  Comments:  On first bout of ambulation pt complained of dizziness, however, with continued bouts pt stated dizziness decreased.  Stair Mobility:  Stair mobility not completed on this date.  Comments:  Wheelchair Mobility:  No w/c mobility completed on this date.  Comments:  Balance:  Static Sitting Balance: fair (+): maintains balance at SBA/supervision without use of UE support  Dynamic Sitting Balance: fair (+): maintains balance at SBA/supervision without use of UE support  Static Standing Balance: fair (-): maintains balance at CGA with use of UE support  Dynamic Standing Balance: poor (+): requires min (A) to maintain balance  Comments: unsteady with sit<>stand transfers    Other Therapeutic Interventions  - D/c assessment and planning  - ADLs (see OT note)  - transfer and gait training   - AD education    Functional Outcomes  -PAC Inpatient Mobility Raw Score : 17              Cognition  WFL  Orientation:    alert and oriented x 4  Command Following:   accurately follows one step commands    Education  Barriers To Learning: language (requires )  Patient Education: patient educated on goals, PT role and benefits, plan of care, general safety, functional mobility training, proper use of assistive device/equipment, transfer training, discharge recommendations  Learning Assessment:  patient verbalizes understanding, would benefit from continued reinforcement    Assessment  Activity Tolerance: Good, pt able to complete session with minimal TIDWELL. Dizziness improved with activity.   Impairments Requiring Therapeutic Intervention: decreased functional mobility, decreased ROM, decreased strength, decreased safety awareness, decreased balance, increased pain  Prognosis: good  Clinical Assessment: Pt is a 48 year old M admitted for abdominal pain. Pt speaks Akan ([place] dialect) and we were unable to connect with  for this session,

## 2024-06-08 ENCOUNTER — APPOINTMENT (OUTPATIENT)
Dept: CT IMAGING | Age: 49
DRG: 897 | End: 2024-06-08
Payer: MEDICAID

## 2024-06-08 ENCOUNTER — HOSPITAL ENCOUNTER (INPATIENT)
Age: 49
LOS: 4 days | Discharge: HOME | DRG: 897 | End: 2024-06-12
Attending: EMERGENCY MEDICINE | Admitting: STUDENT IN AN ORGANIZED HEALTH CARE EDUCATION/TRAINING PROGRAM
Payer: MEDICAID

## 2024-06-08 DIAGNOSIS — R10.33 PERIUMBILICAL ABDOMINAL PAIN: Primary | ICD-10-CM

## 2024-06-08 DIAGNOSIS — R74.01 TRANSAMINITIS: ICD-10-CM

## 2024-06-08 DIAGNOSIS — F10.932 ALCOHOL WITHDRAWAL SYNDROME WITH PERCEPTUAL DISTURBANCE (HCC): ICD-10-CM

## 2024-06-08 PROBLEM — F10.931 ALCOHOL WITHDRAWAL DELIRIUM, ACUTE, HYPERACTIVE (HCC): Status: ACTIVE | Noted: 2024-06-08

## 2024-06-08 LAB
ALBUMIN SERPL-MCNC: 3.8 G/DL (ref 3.4–5)
ALBUMIN/GLOB SERPL: 0.9 {RATIO} (ref 1.1–2.2)
ALP SERPL-CCNC: 410 U/L (ref 40–129)
ALT SERPL-CCNC: 109 U/L (ref 10–40)
ANION GAP SERPL CALCULATED.3IONS-SCNC: 13 MMOL/L (ref 3–16)
AST SERPL-CCNC: 431 U/L (ref 15–37)
BASOPHILS # BLD: 0 K/UL (ref 0–0.2)
BASOPHILS NFR BLD: 0.3 %
BILIRUB SERPL-MCNC: 0.5 MG/DL (ref 0–1)
BUN SERPL-MCNC: 12 MG/DL (ref 7–20)
CALCIUM SERPL-MCNC: 8.8 MG/DL (ref 8.3–10.6)
CHLORIDE SERPL-SCNC: 99 MMOL/L (ref 99–110)
CK SERPL-CCNC: 156 U/L (ref 39–308)
CO2 SERPL-SCNC: 18 MMOL/L (ref 21–32)
CREAT SERPL-MCNC: 0.8 MG/DL (ref 0.9–1.3)
DEPRECATED RDW RBC AUTO: 16.1 % (ref 12.4–15.4)
EOSINOPHIL # BLD: 0.1 K/UL (ref 0–0.6)
EOSINOPHIL NFR BLD: 1.5 %
ETHANOLAMINE SERPL-MCNC: 127 MG/DL (ref 0–0.08)
GLUCOSE SERPL-MCNC: 101 MG/DL (ref 70–99)
HCT VFR BLD AUTO: 36.1 % (ref 40.5–52.5)
HGB BLD-MCNC: 13 G/DL (ref 13.5–17.5)
LACTATE BLDV-SCNC: 2.6 MMOL/L (ref 0.4–1.9)
LIPASE SERPL-CCNC: 49 U/L (ref 13–60)
LYMPHOCYTES # BLD: 1.3 K/UL (ref 1–5.1)
LYMPHOCYTES NFR BLD: 36.5 %
MAGNESIUM SERPL-MCNC: 1.7 MG/DL (ref 1.8–2.4)
MCH RBC QN AUTO: 35.4 PG (ref 26–34)
MCHC RBC AUTO-ENTMCNC: 36 G/DL (ref 31–36)
MCV RBC AUTO: 98.3 FL (ref 80–100)
MONOCYTES # BLD: 0.5 K/UL (ref 0–1.3)
MONOCYTES NFR BLD: 12.6 %
NEUTROPHILS # BLD: 1.8 K/UL (ref 1.7–7.7)
NEUTROPHILS NFR BLD: 49.1 %
PLATELET # BLD AUTO: 110 K/UL (ref 135–450)
PMV BLD AUTO: 8.9 FL (ref 5–10.5)
POTASSIUM SERPL-SCNC: 4.2 MMOL/L (ref 3.5–5.1)
PROT SERPL-MCNC: 8.1 G/DL (ref 6.4–8.2)
RBC # BLD AUTO: 3.68 M/UL (ref 4.2–5.9)
REASON FOR REJECTION: NORMAL
REASON FOR REJECTION: NORMAL
REJECTED TEST: NORMAL
REJECTED TEST: NORMAL
SODIUM SERPL-SCNC: 130 MMOL/L (ref 136–145)
WBC # BLD AUTO: 3.6 K/UL (ref 4–11)

## 2024-06-08 PROCEDURE — 2580000003 HC RX 258: Performed by: EMERGENCY MEDICINE

## 2024-06-08 PROCEDURE — 82077 ASSAY SPEC XCP UR&BREATH IA: CPT

## 2024-06-08 PROCEDURE — 6360000002 HC RX W HCPCS: Performed by: PHYSICIAN ASSISTANT

## 2024-06-08 PROCEDURE — 83690 ASSAY OF LIPASE: CPT

## 2024-06-08 PROCEDURE — 74177 CT ABD & PELVIS W/CONTRAST: CPT

## 2024-06-08 PROCEDURE — 36415 COLL VENOUS BLD VENIPUNCTURE: CPT

## 2024-06-08 PROCEDURE — 2060000000 HC ICU INTERMEDIATE R&B

## 2024-06-08 PROCEDURE — 93005 ELECTROCARDIOGRAM TRACING: CPT | Performed by: EMERGENCY MEDICINE

## 2024-06-08 PROCEDURE — 6360000002 HC RX W HCPCS: Performed by: NURSE PRACTITIONER

## 2024-06-08 PROCEDURE — 82550 ASSAY OF CK (CPK): CPT

## 2024-06-08 PROCEDURE — 83735 ASSAY OF MAGNESIUM: CPT

## 2024-06-08 PROCEDURE — 83605 ASSAY OF LACTIC ACID: CPT

## 2024-06-08 PROCEDURE — 99285 EMERGENCY DEPT VISIT HI MDM: CPT

## 2024-06-08 PROCEDURE — 6360000004 HC RX CONTRAST MEDICATION: Performed by: PHYSICIAN ASSISTANT

## 2024-06-08 PROCEDURE — 96374 THER/PROPH/DIAG INJ IV PUSH: CPT

## 2024-06-08 PROCEDURE — 2500000003 HC RX 250 WO HCPCS: Performed by: EMERGENCY MEDICINE

## 2024-06-08 PROCEDURE — 85025 COMPLETE CBC W/AUTO DIFF WBC: CPT

## 2024-06-08 PROCEDURE — 6360000002 HC RX W HCPCS: Performed by: EMERGENCY MEDICINE

## 2024-06-08 PROCEDURE — 2580000003 HC RX 258: Performed by: NURSE PRACTITIONER

## 2024-06-08 PROCEDURE — 80053 COMPREHEN METABOLIC PANEL: CPT

## 2024-06-08 PROCEDURE — 6370000000 HC RX 637 (ALT 250 FOR IP): Performed by: NURSE PRACTITIONER

## 2024-06-08 PROCEDURE — 96375 TX/PRO/DX INJ NEW DRUG ADDON: CPT

## 2024-06-08 RX ORDER — SODIUM CHLORIDE 9 MG/ML
INJECTION, SOLUTION INTRAVENOUS CONTINUOUS
Status: DISCONTINUED | OUTPATIENT
Start: 2024-06-08 | End: 2024-06-10

## 2024-06-08 RX ORDER — SODIUM CHLORIDE 9 MG/ML
INJECTION, SOLUTION INTRAVENOUS PRN
Status: DISCONTINUED | OUTPATIENT
Start: 2024-06-08 | End: 2024-06-12 | Stop reason: HOSPADM

## 2024-06-08 RX ORDER — POTASSIUM CHLORIDE 20 MEQ/1
40 TABLET, EXTENDED RELEASE ORAL PRN
Status: DISCONTINUED | OUTPATIENT
Start: 2024-06-08 | End: 2024-06-12 | Stop reason: HOSPADM

## 2024-06-08 RX ORDER — LORAZEPAM 1 MG/1
1 TABLET ORAL
Status: DISCONTINUED | OUTPATIENT
Start: 2024-06-08 | End: 2024-06-08

## 2024-06-08 RX ORDER — DICYCLOMINE HYDROCHLORIDE 10 MG/1
10 CAPSULE ORAL 4 TIMES DAILY PRN
Status: DISCONTINUED | OUTPATIENT
Start: 2024-06-08 | End: 2024-06-12 | Stop reason: HOSPADM

## 2024-06-08 RX ORDER — ACETAMINOPHEN 650 MG/1
650 SUPPOSITORY RECTAL EVERY 6 HOURS PRN
Status: DISCONTINUED | OUTPATIENT
Start: 2024-06-08 | End: 2024-06-12 | Stop reason: HOSPADM

## 2024-06-08 RX ORDER — SODIUM CHLORIDE 9 MG/ML
INJECTION, SOLUTION INTRAVENOUS PRN
Status: DISCONTINUED | OUTPATIENT
Start: 2024-06-08 | End: 2024-06-08

## 2024-06-08 RX ORDER — ACETAMINOPHEN 325 MG/1
650 TABLET ORAL EVERY 6 HOURS PRN
Status: DISCONTINUED | OUTPATIENT
Start: 2024-06-08 | End: 2024-06-12 | Stop reason: HOSPADM

## 2024-06-08 RX ORDER — FAMOTIDINE 20 MG/1
20 TABLET, FILM COATED ORAL 2 TIMES DAILY
Status: DISCONTINUED | OUTPATIENT
Start: 2024-06-09 | End: 2024-06-12 | Stop reason: HOSPADM

## 2024-06-08 RX ORDER — LEVETIRACETAM 500 MG/1
500 TABLET ORAL 2 TIMES DAILY
Status: DISCONTINUED | OUTPATIENT
Start: 2024-06-08 | End: 2024-06-12 | Stop reason: HOSPADM

## 2024-06-08 RX ORDER — AMLODIPINE BESYLATE 5 MG/1
5 TABLET ORAL DAILY
Status: DISCONTINUED | OUTPATIENT
Start: 2024-06-09 | End: 2024-06-12 | Stop reason: HOSPADM

## 2024-06-08 RX ORDER — SODIUM CHLORIDE 0.9 % (FLUSH) 0.9 %
5-40 SYRINGE (ML) INJECTION EVERY 12 HOURS SCHEDULED
Status: DISCONTINUED | OUTPATIENT
Start: 2024-06-08 | End: 2024-06-08 | Stop reason: HOSPADM

## 2024-06-08 RX ORDER — MULTIVITAMIN WITH IRON
1 TABLET ORAL DAILY
Status: DISCONTINUED | OUTPATIENT
Start: 2024-06-09 | End: 2024-06-12 | Stop reason: HOSPADM

## 2024-06-08 RX ORDER — MAGNESIUM SULFATE IN WATER 40 MG/ML
2000 INJECTION, SOLUTION INTRAVENOUS PRN
Status: DISCONTINUED | OUTPATIENT
Start: 2024-06-08 | End: 2024-06-12 | Stop reason: HOSPADM

## 2024-06-08 RX ORDER — MAGNESIUM SULFATE IN WATER 40 MG/ML
2000 INJECTION, SOLUTION INTRAVENOUS ONCE
Status: COMPLETED | OUTPATIENT
Start: 2024-06-08 | End: 2024-06-09

## 2024-06-08 RX ORDER — POTASSIUM CHLORIDE 7.45 MG/ML
10 INJECTION INTRAVENOUS PRN
Status: DISCONTINUED | OUTPATIENT
Start: 2024-06-08 | End: 2024-06-12 | Stop reason: HOSPADM

## 2024-06-08 RX ORDER — ONDANSETRON 4 MG/1
4 TABLET, ORALLY DISINTEGRATING ORAL EVERY 8 HOURS PRN
Status: DISCONTINUED | OUTPATIENT
Start: 2024-06-08 | End: 2024-06-12 | Stop reason: HOSPADM

## 2024-06-08 RX ORDER — LORAZEPAM 1 MG/1
3 TABLET ORAL
Status: DISCONTINUED | OUTPATIENT
Start: 2024-06-08 | End: 2024-06-12 | Stop reason: HOSPADM

## 2024-06-08 RX ORDER — LORAZEPAM 1 MG/1
2 TABLET ORAL
Status: DISCONTINUED | OUTPATIENT
Start: 2024-06-08 | End: 2024-06-12 | Stop reason: HOSPADM

## 2024-06-08 RX ORDER — LORAZEPAM 1 MG/1
2 TABLET ORAL
Status: DISCONTINUED | OUTPATIENT
Start: 2024-06-08 | End: 2024-06-08

## 2024-06-08 RX ORDER — SODIUM CHLORIDE 0.9 % (FLUSH) 0.9 %
5-40 SYRINGE (ML) INJECTION PRN
Status: DISCONTINUED | OUTPATIENT
Start: 2024-06-08 | End: 2024-06-12 | Stop reason: HOSPADM

## 2024-06-08 RX ORDER — POLYETHYLENE GLYCOL 3350 17 G/17G
17 POWDER, FOR SOLUTION ORAL DAILY PRN
Status: DISCONTINUED | OUTPATIENT
Start: 2024-06-08 | End: 2024-06-12 | Stop reason: HOSPADM

## 2024-06-08 RX ORDER — LORAZEPAM 1 MG/1
1 TABLET ORAL
Status: DISCONTINUED | OUTPATIENT
Start: 2024-06-08 | End: 2024-06-12 | Stop reason: HOSPADM

## 2024-06-08 RX ORDER — SODIUM CHLORIDE, SODIUM LACTATE, POTASSIUM CHLORIDE, AND CALCIUM CHLORIDE .6; .31; .03; .02 G/100ML; G/100ML; G/100ML; G/100ML
1000 INJECTION, SOLUTION INTRAVENOUS ONCE
Status: COMPLETED | OUTPATIENT
Start: 2024-06-08 | End: 2024-06-08

## 2024-06-08 RX ORDER — LORAZEPAM 1 MG/1
4 TABLET ORAL
Status: DISCONTINUED | OUTPATIENT
Start: 2024-06-08 | End: 2024-06-08

## 2024-06-08 RX ORDER — LORAZEPAM 1 MG/1
4 TABLET ORAL
Status: DISCONTINUED | OUTPATIENT
Start: 2024-06-08 | End: 2024-06-12 | Stop reason: HOSPADM

## 2024-06-08 RX ORDER — SODIUM CHLORIDE 0.9 % (FLUSH) 0.9 %
5-40 SYRINGE (ML) INJECTION PRN
Status: DISCONTINUED | OUTPATIENT
Start: 2024-06-08 | End: 2024-06-08 | Stop reason: HOSPADM

## 2024-06-08 RX ORDER — PHENOBARBITAL SODIUM 130 MG/ML
130 INJECTION, SOLUTION INTRAMUSCULAR; INTRAVENOUS ONCE
Status: COMPLETED | OUTPATIENT
Start: 2024-06-08 | End: 2024-06-08

## 2024-06-08 RX ORDER — ONDANSETRON 2 MG/ML
4 INJECTION INTRAMUSCULAR; INTRAVENOUS EVERY 6 HOURS PRN
Status: DISCONTINUED | OUTPATIENT
Start: 2024-06-08 | End: 2024-06-12 | Stop reason: HOSPADM

## 2024-06-08 RX ORDER — LORAZEPAM 1 MG/1
3 TABLET ORAL
Status: DISCONTINUED | OUTPATIENT
Start: 2024-06-08 | End: 2024-06-08

## 2024-06-08 RX ORDER — ONDANSETRON 2 MG/ML
4 INJECTION INTRAMUSCULAR; INTRAVENOUS EVERY 6 HOURS PRN
Status: DISCONTINUED | OUTPATIENT
Start: 2024-06-08 | End: 2024-06-08

## 2024-06-08 RX ORDER — SODIUM CHLORIDE 0.9 % (FLUSH) 0.9 %
5-40 SYRINGE (ML) INJECTION EVERY 12 HOURS SCHEDULED
Status: DISCONTINUED | OUTPATIENT
Start: 2024-06-08 | End: 2024-06-12 | Stop reason: HOSPADM

## 2024-06-08 RX ORDER — THIAMINE HYDROCHLORIDE 100 MG/ML
100 INJECTION, SOLUTION INTRAMUSCULAR; INTRAVENOUS DAILY
Status: DISCONTINUED | OUTPATIENT
Start: 2024-06-09 | End: 2024-06-11

## 2024-06-08 RX ORDER — ENOXAPARIN SODIUM 100 MG/ML
40 INJECTION SUBCUTANEOUS DAILY
Status: DISCONTINUED | OUTPATIENT
Start: 2024-06-09 | End: 2024-06-12 | Stop reason: HOSPADM

## 2024-06-08 RX ORDER — SODIUM CHLORIDE, SODIUM LACTATE, POTASSIUM CHLORIDE, CALCIUM CHLORIDE 600; 310; 30; 20 MG/100ML; MG/100ML; MG/100ML; MG/100ML
INJECTION, SOLUTION INTRAVENOUS CONTINUOUS
Status: CANCELLED | OUTPATIENT
Start: 2024-06-08 | End: 2024-06-10

## 2024-06-08 RX ADMIN — SODIUM CHLORIDE: 9 INJECTION, SOLUTION INTRAVENOUS at 23:28

## 2024-06-08 RX ADMIN — LEVETIRACETAM 500 MG: 500 TABLET, FILM COATED ORAL at 23:17

## 2024-06-08 RX ADMIN — THIAMINE HYDROCHLORIDE: 100 INJECTION, SOLUTION INTRAMUSCULAR; INTRAVENOUS at 20:37

## 2024-06-08 RX ADMIN — CHLORDIAZEPOXIDE HYDROCHLORIDE 25 MG: 10 CAPSULE ORAL at 23:17

## 2024-06-08 RX ADMIN — MAGNESIUM SULFATE HEPTAHYDRATE 2000 MG: 40 INJECTION, SOLUTION INTRAVENOUS at 23:29

## 2024-06-08 RX ADMIN — PHENOBARBITAL SODIUM 130 MG: 130 INJECTION INTRAMUSCULAR; INTRAVENOUS at 19:30

## 2024-06-08 RX ADMIN — SODIUM CHLORIDE, POTASSIUM CHLORIDE, SODIUM LACTATE AND CALCIUM CHLORIDE 1000 ML: 600; 310; 30; 20 INJECTION, SOLUTION INTRAVENOUS at 19:29

## 2024-06-08 RX ADMIN — IOPAMIDOL 75 ML: 755 INJECTION, SOLUTION INTRAVENOUS at 17:36

## 2024-06-08 RX ADMIN — ONDANSETRON 4 MG: 2 INJECTION INTRAMUSCULAR; INTRAVENOUS at 16:04

## 2024-06-08 ASSESSMENT — PAIN DESCRIPTION - LOCATION: LOCATION: ABDOMEN

## 2024-06-08 ASSESSMENT — PAIN SCALES - GENERAL: PAINLEVEL_OUTOF10: 8

## 2024-06-08 ASSESSMENT — PAIN DESCRIPTION - ORIENTATION: ORIENTATION: LEFT

## 2024-06-08 ASSESSMENT — PAIN DESCRIPTION - DESCRIPTORS: DESCRIPTORS: ACHING;SHOOTING

## 2024-06-08 NOTE — ED PROVIDER NOTES
[facility] [place]  EMERGENCY DEPARTMENT ENCOUNTER        Pt Name: Troy Benitez  MRN: [id number]  Birthdate 1975  Date of evaluation: 6/8/2024  Provider: HECTOR Velasquez  PCP: Frank Taylor MD  Note Started: 3:48 PM EDT 6/8/24       I have seen and evaluated this patient with my supervising physician Dr. Lashaun HEIN.      CHIEF COMPLAINT       Chief Complaint   Patient presents with    Abdominal Pain     Reports N/V/D with abd pain for one week. Describes pain as sharp in center of abd.       HISTORY OF PRESENT ILLNESS: 1 or more Elements     History from : Patient    Limitations to history :  used for history gathering.     Troy Benitez is a 48 y.o. male who presents to the emergency room due to nausea vomiting diarrhea with abdominal pain has been going on for the last week.  Describes pain as a sharp stabbing pain in the center of his abdomen.  States that he used to drink alcohol but when speaking with the  and confirmed with patient he states that he drinks beer at times.  States he did have a beer 1 week ago.  He denies any abdominal surgeries.  Denies any chest pain shortness breath difficulty breathing.    Nursing Notes were all reviewed and agreed with or any disagreements were addressed in the HPI.    REVIEW OF SYSTEMS :      Review of Systems   Constitutional:  Negative for chills, diaphoresis and fever.   HENT:  Negative for congestion, rhinorrhea and sore throat.    Eyes:  Negative for pain and visual disturbance.   Respiratory:  Negative for cough and shortness of breath.    Cardiovascular:  Negative for chest pain and leg swelling.   Gastrointestinal:  Positive for abdominal pain, diarrhea, nausea and vomiting. Negative for constipation.   Genitourinary:  Negative for difficulty urinating, dysuria and frequency.   Musculoskeletal:  Negative for back pain and neck pain.   Skin:  Negative for rash and wound.   Neurological:  Negative for dizziness and

## 2024-06-08 NOTE — ED PROVIDER NOTES
In addition to the advanced practice provider, I personally saw Troy Benitez and performed a substantive portion of the visit including all aspects of the medical decision making. I made/approved the management plan and take responsibility for the patient management    Briefly, this is a 48 y.o. male here for abdominal pain.  Pain most severe around the bellybutton and left upper abdomen, ongoing for the past week.  Associated with nausea, vomiting and diarrhea.  Patient also reports tremors in both arms.  History of alcohol abuse with admissions for withdrawal in the past, patient states he is no longer drinking alcohol however cannot describe when his last drink of alcohol was.  Of note, patient's primary language is reportedly a Bengali dialect with English as a second language, patient refused  phone, prefers to speak in English.    On exam, patient afebrile and nontoxic. No distress. Heart RRR. Lungs CTAB. Abdomen soft, nondistended, tender to palpation in periumbilical region and left hemiabdomen.  No focal right upper or right lower quadrant tenderness.  No rebound, no rigidity, no guarding.  Alert, oriented to person and place, not oriented to month, correctly names year.  Responds to some questions with irrelevant answers although unclear if this is due to disorientation or language barrier as patient is refusing  phone.  Speech clear, face symmetric. CN 2-12 intact. 5/5 motor and sensation grossly intact all extremities.  Resting tremors of bilateral upper extremities noted.  Normal gait was observed.      EKG  EKG was reviewed by emergency department physician in the absence of a cardiologist    Narrow complex sinus rhythm, rate 64, normal axis, normal RI and QRS intervals, normal Qtc, no ST elevations or depressions, normal t-wave morphology, impression NSR, no STEMI      Screenings          Is this patient to be included in the SEP-1 Core Measure due to severe sepsis or septic shock?

## 2024-06-09 LAB
ALBUMIN SERPL-MCNC: 3.4 G/DL (ref 3.4–5)
ALBUMIN/GLOB SERPL: 0.8 {RATIO} (ref 1.1–2.2)
ALP SERPL-CCNC: 308 U/L (ref 40–129)
ALT SERPL-CCNC: 115 U/L (ref 10–40)
AMPHETAMINES UR QL SCN>1000 NG/ML: ABNORMAL
ANION GAP SERPL CALCULATED.3IONS-SCNC: 12 MMOL/L (ref 3–16)
AST SERPL-CCNC: 371 U/L (ref 15–37)
BARBITURATES UR QL SCN>200 NG/ML: POSITIVE
BASOPHILS # BLD: 0.1 K/UL (ref 0–0.2)
BASOPHILS NFR BLD: 3.3 %
BENZODIAZ UR QL SCN>200 NG/ML: ABNORMAL
BILIRUB SERPL-MCNC: 1 MG/DL (ref 0–1)
BILIRUB UR QL STRIP.AUTO: NEGATIVE
BUN SERPL-MCNC: 9 MG/DL (ref 7–20)
CALCIUM SERPL-MCNC: 8.3 MG/DL (ref 8.3–10.6)
CANNABINOIDS UR QL SCN>50 NG/ML: ABNORMAL
CHLORIDE SERPL-SCNC: 99 MMOL/L (ref 99–110)
CHOLEST SERPL-MCNC: 156 MG/DL (ref 0–199)
CLARITY UR: CLEAR
CO2 SERPL-SCNC: 24 MMOL/L (ref 21–32)
COCAINE UR QL SCN: ABNORMAL
COLOR UR: YELLOW
CREAT SERPL-MCNC: 0.8 MG/DL (ref 0.9–1.3)
DEPRECATED RDW RBC AUTO: 15.8 % (ref 12.4–15.4)
DRUG SCREEN COMMENT UR-IMP: ABNORMAL
EKG ATRIAL RATE: 64 BPM
EKG DIAGNOSIS: NORMAL
EKG P AXIS: 52 DEGREES
EKG P-R INTERVAL: 144 MS
EKG Q-T INTERVAL: 410 MS
EKG QRS DURATION: 98 MS
EKG QTC CALCULATION (BAZETT): 422 MS
EKG R AXIS: 45 DEGREES
EKG T AXIS: 40 DEGREES
EKG VENTRICULAR RATE: 64 BPM
EOSINOPHIL # BLD: 0.2 K/UL (ref 0–0.6)
EOSINOPHIL NFR BLD: 5.7 %
FENTANYL SCREEN, URINE: ABNORMAL
GFR SERPLBLD CREATININE-BSD FMLA CKD-EPI: >90 ML/MIN/{1.73_M2}
GLUCOSE SERPL-MCNC: 92 MG/DL (ref 70–99)
GLUCOSE UR STRIP.AUTO-MCNC: NEGATIVE MG/DL
HCT VFR BLD AUTO: 35.7 % (ref 40.5–52.5)
HDLC SERPL-MCNC: 38 MG/DL (ref 40–60)
HGB BLD-MCNC: 12.7 G/DL (ref 13.5–17.5)
HGB UR QL STRIP.AUTO: NEGATIVE
KETONES UR STRIP.AUTO-MCNC: NEGATIVE MG/DL
LDLC SERPL CALC-MCNC: ABNORMAL MG/DL
LDLC SERPL-MCNC: 32 MG/DL
LEUKOCYTE ESTERASE UR QL STRIP.AUTO: NEGATIVE
LYMPHOCYTES # BLD: 1 K/UL (ref 1–5.1)
LYMPHOCYTES NFR BLD: 36.4 %
MCH RBC QN AUTO: 34.8 PG (ref 26–34)
MCHC RBC AUTO-ENTMCNC: 35.6 G/DL (ref 31–36)
MCV RBC AUTO: 97.8 FL (ref 80–100)
METHADONE UR QL SCN>300 NG/ML: ABNORMAL
MONOCYTES # BLD: 0.3 K/UL (ref 0–1.3)
MONOCYTES NFR BLD: 11.6 %
NEUTROPHILS # BLD: 1.2 K/UL (ref 1.7–7.7)
NEUTROPHILS NFR BLD: 43 %
NITRITE UR QL STRIP.AUTO: NEGATIVE
OPIATES UR QL SCN>300 NG/ML: ABNORMAL
OXYCODONE UR QL SCN: ABNORMAL
PCP UR QL SCN>25 NG/ML: ABNORMAL
PH UR STRIP.AUTO: 6 [PH] (ref 5–8)
PH UR STRIP: 5 [PH]
PHOSPHATE SERPL-MCNC: 2.7 MG/DL (ref 2.5–4.9)
PLATELET # BLD AUTO: 91 K/UL (ref 135–450)
PMV BLD AUTO: 9 FL (ref 5–10.5)
POTASSIUM SERPL-SCNC: 3.8 MMOL/L (ref 3.5–5.1)
PROT SERPL-MCNC: 7.7 G/DL (ref 6.4–8.2)
PROT UR STRIP.AUTO-MCNC: NEGATIVE MG/DL
RBC # BLD AUTO: 3.65 M/UL (ref 4.2–5.9)
SODIUM SERPL-SCNC: 135 MMOL/L (ref 136–145)
SP GR UR STRIP.AUTO: 1.01 (ref 1–1.03)
TRIGL SERPL-MCNC: 400 MG/DL (ref 0–150)
UA COMPLETE W REFLEX CULTURE PNL UR: NORMAL
UA DIPSTICK W REFLEX MICRO PNL UR: NORMAL
URN SPEC COLLECT METH UR: NORMAL
UROBILINOGEN UR STRIP-ACNC: 0.2 E.U./DL
VLDLC SERPL CALC-MCNC: ABNORMAL MG/DL
WBC # BLD AUTO: 2.8 K/UL (ref 4–11)

## 2024-06-09 PROCEDURE — 82746 ASSAY OF FOLIC ACID SERUM: CPT

## 2024-06-09 PROCEDURE — 2580000003 HC RX 258: Performed by: NURSE PRACTITIONER

## 2024-06-09 PROCEDURE — 80061 LIPID PANEL: CPT

## 2024-06-09 PROCEDURE — 6360000002 HC RX W HCPCS: Performed by: NURSE PRACTITIONER

## 2024-06-09 PROCEDURE — 81003 URINALYSIS AUTO W/O SCOPE: CPT

## 2024-06-09 PROCEDURE — 83540 ASSAY OF IRON: CPT

## 2024-06-09 PROCEDURE — 93010 ELECTROCARDIOGRAM REPORT: CPT | Performed by: INTERNAL MEDICINE

## 2024-06-09 PROCEDURE — 80307 DRUG TEST PRSMV CHEM ANLYZR: CPT

## 2024-06-09 PROCEDURE — 6370000000 HC RX 637 (ALT 250 FOR IP): Performed by: NURSE PRACTITIONER

## 2024-06-09 PROCEDURE — 82607 VITAMIN B-12: CPT

## 2024-06-09 PROCEDURE — 84100 ASSAY OF PHOSPHORUS: CPT

## 2024-06-09 PROCEDURE — 83550 IRON BINDING TEST: CPT

## 2024-06-09 PROCEDURE — 80053 COMPREHEN METABOLIC PANEL: CPT

## 2024-06-09 PROCEDURE — 2060000000 HC ICU INTERMEDIATE R&B

## 2024-06-09 PROCEDURE — 36415 COLL VENOUS BLD VENIPUNCTURE: CPT

## 2024-06-09 PROCEDURE — 85025 COMPLETE CBC W/AUTO DIFF WBC: CPT

## 2024-06-09 RX ORDER — CHLORDIAZEPOXIDE HYDROCHLORIDE 10 MG/1
10 CAPSULE, GELATIN COATED ORAL 3 TIMES DAILY
Status: DISCONTINUED | OUTPATIENT
Start: 2024-06-09 | End: 2024-06-09

## 2024-06-09 RX ORDER — CHLORDIAZEPOXIDE HYDROCHLORIDE 10 MG/1
10 CAPSULE, GELATIN COATED ORAL 2 TIMES DAILY
Status: DISCONTINUED | OUTPATIENT
Start: 2024-06-09 | End: 2024-06-12 | Stop reason: HOSPADM

## 2024-06-09 RX ADMIN — FAMOTIDINE 20 MG: 20 TABLET, FILM COATED ORAL at 20:45

## 2024-06-09 RX ADMIN — CHLORDIAZEPOXIDE HYDROCHLORIDE 10 MG: 10 CAPSULE ORAL at 08:36

## 2024-06-09 RX ADMIN — LEVETIRACETAM 500 MG: 500 TABLET, FILM COATED ORAL at 20:45

## 2024-06-09 RX ADMIN — DICYCLOMINE HYDROCHLORIDE 10 MG: 10 CAPSULE ORAL at 20:45

## 2024-06-09 RX ADMIN — DICYCLOMINE HYDROCHLORIDE 10 MG: 10 CAPSULE ORAL at 10:27

## 2024-06-09 RX ADMIN — THIAMINE HYDROCHLORIDE 100 MG: 100 INJECTION, SOLUTION INTRAMUSCULAR; INTRAVENOUS at 10:28

## 2024-06-09 RX ADMIN — FAMOTIDINE 20 MG: 20 TABLET, FILM COATED ORAL at 08:36

## 2024-06-09 RX ADMIN — THERA TABS 1 TABLET: TAB at 08:36

## 2024-06-09 RX ADMIN — AMLODIPINE BESYLATE 5 MG: 5 TABLET ORAL at 08:36

## 2024-06-09 RX ADMIN — LEVETIRACETAM 500 MG: 500 TABLET, FILM COATED ORAL at 08:36

## 2024-06-09 RX ADMIN — ENOXAPARIN SODIUM 40 MG: 100 INJECTION SUBCUTANEOUS at 08:36

## 2024-06-09 RX ADMIN — SODIUM CHLORIDE, PRESERVATIVE FREE 10 ML: 5 INJECTION INTRAVENOUS at 08:36

## 2024-06-09 RX ADMIN — CHLORDIAZEPOXIDE HYDROCHLORIDE 10 MG: 10 CAPSULE ORAL at 20:45

## 2024-06-09 ASSESSMENT — PAIN SCALES - WONG BAKER
WONGBAKER_NUMERICALRESPONSE: HURTS LITTLE MORE

## 2024-06-09 ASSESSMENT — PAIN SCALES - GENERAL
PAINLEVEL_OUTOF10: 0
PAINLEVEL_OUTOF10: 8

## 2024-06-09 ASSESSMENT — PAIN DESCRIPTION - LOCATION: LOCATION: ABDOMEN

## 2024-06-09 NOTE — PROGRESS NOTES
[facility]    6/9/2024 5:22 PM        Name: Troy Benitez .              Admitted: 6/8/2024  Primary Care Provider: Frank Taylor MD (Tel: [phone])        Subjective:    Lying bed no chest pain fevers chills nausea vomit    Reviewed interval ancillary notes    Current Medications  chlordiazePOXIDE (LIBRIUM) capsule 10 mg, BID  amLODIPine (NORVASC) tablet 5 mg, Daily  dicyclomine (BENTYL) capsule 10 mg, 4x Daily PRN  famotidine (PEPCID) tablet 20 mg, BID  levETIRAcetam (KEPPRA) tablet 500 mg, BID  sodium chloride flush 0.9 % injection 5-40 mL, 2 times per day  sodium chloride flush 0.9 % injection 5-40 mL, PRN  0.9 % sodium chloride infusion, PRN  potassium chloride (KLOR-CON M) extended release tablet 40 mEq, PRN   Or  potassium bicarb-citric acid (EFFER-K) effervescent tablet 40 mEq, PRN   Or  potassium chloride 10 mEq/100 mL IVPB (Peripheral Line), PRN  magnesium sulfate 2000 mg in 50 mL IVPB premix, PRN  enoxaparin (LOVENOX) injection 40 mg, Daily  ondansetron (ZOFRAN-ODT) disintegrating tablet 4 mg, Q8H PRN   Or  ondansetron (ZOFRAN) injection 4 mg, Q6H PRN  polyethylene glycol (GLYCOLAX) packet 17 g, Daily PRN  acetaminophen (TYLENOL) tablet 650 mg, Q6H PRN   Or  acetaminophen (TYLENOL) suppository 650 mg, Q6H PRN  thiamine (B-1) injection 100 mg, Daily  multivitamin 1 tablet, Daily  LORazepam (ATIVAN) tablet 1 mg, Q1H PRN   Or  LORazepam (ATIVAN) 1 mg in sodium chloride (PF) 0.9 % 10 mL injection, Q1H PRN   Or  LORazepam (ATIVAN) tablet 2 mg, Q1H PRN   Or  LORazepam (ATIVAN) 2 mg in sodium chloride (PF) 0.9 % 10 mL injection, Q1H PRN   Or  LORazepam (ATIVAN) tablet 3 mg, Q1H PRN   Or  LORazepam (ATIVAN) 3 mg in sodium chloride (PF) 0.9 % 10 mL injection, Q1H PRN   Or  LORazepam (ATIVAN) tablet 4 mg, Q1H PRN   Or  LORazepam (ATIVAN) 4 mg in sodium chloride (PF) 0.9 % 10 mL injection, Q1H PRN  0.9 % sodium

## 2024-06-09 NOTE — PROGRESS NOTES
.Shift assessment completed. Routine vitals stable. Scheduled medications given. Patient is awake, alert and oriented. Respirations are easy and unlabored. Call light within reach. CIWA 7, patient assisted to bathroom, patient seems a little out of sorts when he starts walking and will walk hunched over, asked if he was in pain and he said yes. Patient stated when he stands up he has pain in his abdomen but doesn't rate it when asked. Took medications all at one time and patient threw his head back and screamed out as he was swallowing them. Explained to patient that he did not have to take them all at one time, he just shook his head yes. Bed alarm on if patient tries to get up unassisted.

## 2024-06-09 NOTE — PROGRESS NOTES
4 Eyes Skin Assessment     NAME:  Troy Benitez  YOB: 1975  MEDICAL RECORD NUMBER:  [id number]    The patient is being assessed for  Admission    I agree that at least one RN has performed a thorough Head to Toe Skin Assessment on the patient. ALL assessment sites listed below have been assessed.      Areas assessed by both nurses:    Head, Face, Ears, Shoulders, Back, Chest, Arms, Elbows, Hands, Sacrum. Buttock, Coccyx, Ischium, and Legs. Feet and Heels        Does the Patient have a Wound? No noted wound(s)       Luc Prevention initiated by RN: No  Wound Care Orders initiated by RN: No    Pressure Injury (Stage 3,4, Unstageable, DTI, NWPT, and Complex wounds) if present, place Wound referral order by RN under : No    New Ostomies, if present place, Ostomy referral order under : No     Nurse 1 eSignature: Electronically signed by Tricia Sunshine RN on 6/9/24 at 5:23 AM EDT    **SHARE this note so that the co-signing nurse can place an eSignature**    Nurse 2 eSignature: Electronically signed by Opal Raymond RN on 6/9/24 at 5:26 AM EDT

## 2024-06-09 NOTE — PLAN OF CARE
Problem: Discharge Planning  Goal: Discharge to home or other facility with appropriate resources  Outcome: Progressing  Flowsheets (Taken 6/9/2024 5580)  Discharge to home or other facility with appropriate resources: Identify barriers to discharge with patient and caregiver     Problem: Pain  Goal: Verbalizes/displays adequate comfort level or baseline comfort level  Outcome: Progressing

## 2024-06-09 NOTE — H&P
V2.0  History and Physical      Name:  Troy eBnitez /Age/Sex: 1975  (48 y.o. male)   MRN & CSN:  [id number] & [id number] Encounter Date/Time: 2024 8:10 PM EDT   Location:  [facility] PCP: Frank Taylor MD       Hospital Day: 1    Assessment and Plan:   Troy Benitez is a 48 y.o. male  who presents with Alcohol withdrawal delirium, acute, hyperactive (HCC)    Hospital Problems             Last Modified POA    * (Principal) Alcohol withdrawal delirium, acute, hyperactive (HCC) 2024 Yes       Plan:  Acute Alcohol Withdrawal  Admit inpatient with telemetry   Step down unit  He was given IV phenobarbital in ER,  his CIWA is 13 on exam,  he is tremulous but alert and orient will start librium scheduled, ativan per CIWA score  Seizure precautions  Thiamine and multivitamin  Social work consult  IV fluids  Check Mag and phos    2. Transaminitis  Suspect secondary to alcohol, AST/ALT ratio >2  CK normal  Previous admissions liver enzymes have not been as elevated, will trend if continue to rise consult GI     3.  Acute Abdominal Pain  Pain is generalized but worse on left side of abdomen   UA pending  CT reviewed no acute findings for pain, afebrile   Full liquid diet   ? From withdrawal     4. Hypertension  BP stable continue home meds    5. Hx Seizures  Continue Keppra, follows with Neurology outpatient last visit 2023?     6. Dental Pain   Fractured tooth left upper, and right upper  Monitor,  may need antibiotics   Needs outpatient dentist/oral surgery      Disposition:   Current Living situation: home  Expected Disposition: home  Estimated D/C: 3    Diet No diet orders on file   DVT Prophylaxis [x] Lovenox, []  Heparin, [] SCDs, [] Ambulation,  [] Eliquis, [] Xarelto, [] Coumadin   Code Status Prior   Surrogate Decision Maker/ POA      Personally reviewed Lab Studies and Imaging     Discussed management of the case with Rei Wilks MD in ER who recommended admission         History from:

## 2024-06-09 NOTE — PROGRESS NOTES
Patient admitted to 3373, pt A&OX4, pt denies pain at the moment, pt in bed, comfortable, medications given per MAR, assessment done, call light within reach.

## 2024-06-09 NOTE — DISCHARGE INSTR - COC
Continuity of Care Form    Patient Name: Troy Caceres   :  1975  MRN:  [id number]    Admit date:  2024  Discharge date:  ***    Code Status Order: Full Code   Advance Directives:     Admitting Physician:  Maday Dietz MD  PCP: Frank Taylor MD    Discharging Nurse: ***  Discharging Hospital Unit/Room#: 3TN-3373/3373-01  Discharging Unit Phone Number: ***    Emergency Contact:   Extended Emergency Contact Information  Primary Emergency Contact: Gunner Berry  Home Phone: [phone]  Mobile Phone: [phone]  Relation: Friend  Preferred language: English   needed? No  Secondary Emergency Contact: UMA CACERES  Home Phone: [phone]  Relation: Spouse  Preferred language: English   needed? No    Past Surgical History:  Past Surgical History:   Procedure Laterality Date    UPPER GASTROINTESTINAL ENDOSCOPY N/A 2020    EGD BIOPSY performed by Jaime Liu MD at [facility] ENDOSCOPY       Immunization History:     There is no immunization history on file for this patient.    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

## 2024-06-09 NOTE — ED NOTES
How does patient ambulate?   []Low Fall Risk (ambulates by themselves without support)  []Stand by assist   []Contact Guard   []Front wheel walker  []Wheelchair   []Steady  []Bed bound  []History of Lower Extremity Amputation  [x]Unknown, did not assess in the emergency department   How does patient take pills?  []Whole with Water  []Crushed in applesauce  []Crushed in pudding  []Other  [x]Unknown no oral medications were given in the ED  Is patient alert?   [x]Alert  []Drowsy but responds to voice  []Doesn't respond to voice but responds to painful stimuli  []Unresponsive  Is patient oriented?   [x]To person  [x]To place  [x]To time  [x]To situation  []Confused  []Agitated  []Follows commands  If patient is disoriented or from a Skill Nursing Facility has family been notified of admission?   []Yes   []No  Patient belongings?   []Cell phone  []Wallet   []Dentures  [x]Clothing  Any specific patient or family belongings/needs/dynamics?   Pt. Becomes confused with withdrawal symptoms and has trouble speaking english. After phenobarbital administration, pt. CIWA is 0 and is speaking clear english.   Miscellaneous comments/pending orders?  Banana bag to be started after IV bolus of LR     If there are any additional questions please reach out to the Emergency Department.

## 2024-06-10 LAB
ALBUMIN SERPL-MCNC: 3.4 G/DL (ref 3.4–5)
ALBUMIN/GLOB SERPL: 0.8 {RATIO} (ref 1.1–2.2)
ALP SERPL-CCNC: 216 U/L (ref 40–129)
ALT SERPL-CCNC: 106 U/L (ref 10–40)
ANION GAP SERPL CALCULATED.3IONS-SCNC: 11 MMOL/L (ref 3–16)
AST SERPL-CCNC: 250 U/L (ref 15–37)
BILIRUB SERPL-MCNC: 1 MG/DL (ref 0–1)
BUN SERPL-MCNC: 3 MG/DL (ref 7–20)
CALCIUM SERPL-MCNC: 8.6 MG/DL (ref 8.3–10.6)
CHLORIDE SERPL-SCNC: 102 MMOL/L (ref 99–110)
CO2 SERPL-SCNC: 21 MMOL/L (ref 21–32)
CREAT SERPL-MCNC: 0.8 MG/DL (ref 0.9–1.3)
FOLATE SERPL-MCNC: >20 NG/ML (ref 4.78–24.2)
GFR SERPLBLD CREATININE-BSD FMLA CKD-EPI: >90 ML/MIN/{1.73_M2}
GLUCOSE SERPL-MCNC: 122 MG/DL (ref 70–99)
IRON SATN MFR SERPL: 94 % (ref 20–50)
IRON SERPL-MCNC: 246 UG/DL (ref 59–158)
POTASSIUM SERPL-SCNC: 3.6 MMOL/L (ref 3.5–5.1)
PROT SERPL-MCNC: 7.7 G/DL (ref 6.4–8.2)
SODIUM SERPL-SCNC: 134 MMOL/L (ref 136–145)
TIBC SERPL-MCNC: 261 UG/DL (ref 260–445)
VIT B12 SERPL-MCNC: 1808 PG/ML (ref 211–911)

## 2024-06-10 PROCEDURE — 6360000002 HC RX W HCPCS: Performed by: NURSE PRACTITIONER

## 2024-06-10 PROCEDURE — 2060000000 HC ICU INTERMEDIATE R&B

## 2024-06-10 PROCEDURE — 2580000003 HC RX 258: Performed by: NURSE PRACTITIONER

## 2024-06-10 PROCEDURE — 80053 COMPREHEN METABOLIC PANEL: CPT

## 2024-06-10 PROCEDURE — 36415 COLL VENOUS BLD VENIPUNCTURE: CPT

## 2024-06-10 PROCEDURE — 6370000000 HC RX 637 (ALT 250 FOR IP): Performed by: NURSE PRACTITIONER

## 2024-06-10 RX ADMIN — THERA TABS 1 TABLET: TAB at 08:48

## 2024-06-10 RX ADMIN — CHLORDIAZEPOXIDE HYDROCHLORIDE 10 MG: 10 CAPSULE ORAL at 21:43

## 2024-06-10 RX ADMIN — LEVETIRACETAM 500 MG: 500 TABLET, FILM COATED ORAL at 21:43

## 2024-06-10 RX ADMIN — FAMOTIDINE 20 MG: 20 TABLET, FILM COATED ORAL at 08:49

## 2024-06-10 RX ADMIN — CHLORDIAZEPOXIDE HYDROCHLORIDE 10 MG: 10 CAPSULE ORAL at 08:48

## 2024-06-10 RX ADMIN — DICYCLOMINE HYDROCHLORIDE 10 MG: 10 CAPSULE ORAL at 21:43

## 2024-06-10 RX ADMIN — LEVETIRACETAM 500 MG: 500 TABLET, FILM COATED ORAL at 08:48

## 2024-06-10 RX ADMIN — THIAMINE HYDROCHLORIDE 100 MG: 100 INJECTION, SOLUTION INTRAMUSCULAR; INTRAVENOUS at 08:55

## 2024-06-10 RX ADMIN — SODIUM CHLORIDE, PRESERVATIVE FREE 10 ML: 5 INJECTION INTRAVENOUS at 08:49

## 2024-06-10 RX ADMIN — AMLODIPINE BESYLATE 5 MG: 5 TABLET ORAL at 08:48

## 2024-06-10 RX ADMIN — SODIUM CHLORIDE, PRESERVATIVE FREE 10 ML: 5 INJECTION INTRAVENOUS at 21:46

## 2024-06-10 RX ADMIN — FAMOTIDINE 20 MG: 20 TABLET, FILM COATED ORAL at 21:43

## 2024-06-10 ASSESSMENT — PAIN SCALES - GENERAL
PAINLEVEL_OUTOF10: 0
PAINLEVEL_OUTOF10: 0

## 2024-06-10 ASSESSMENT — PAIN SCALES - WONG BAKER
WONGBAKER_NUMERICALRESPONSE: HURTS LITTLE MORE

## 2024-06-10 NOTE — PROGRESS NOTES
Shift assessment completed. Routine vitals stable. Scheduled medications given. Patient is awake, alert and oriented. Respirations are easy and unlabored. Patient does not appear to be in distress, resting comfortably at this time. Call light within reach.  Patient sat up at the bedside to eat breakfast. Secure message sent to Dr Juárez to upgrade diet, patient requesting and abdominal pain is much better. Tolerating full liquid diet at this time well.

## 2024-06-10 NOTE — PROGRESS NOTES
[facility]    6/10/2024 4:51 PM        Name: Troy Benitez .              Admitted: 6/8/2024  Primary Care Provider: Frank Taylor MD (Tel: [phone])        Subjective:    Feeling better no chest pain sob orfevers    Reviewed interval ancillary notes    Current Medications  chlordiazePOXIDE (LIBRIUM) capsule 10 mg, BID  amLODIPine (NORVASC) tablet 5 mg, Daily  dicyclomine (BENTYL) capsule 10 mg, 4x Daily PRN  famotidine (PEPCID) tablet 20 mg, BID  levETIRAcetam (KEPPRA) tablet 500 mg, BID  sodium chloride flush 0.9 % injection 5-40 mL, 2 times per day  sodium chloride flush 0.9 % injection 5-40 mL, PRN  0.9 % sodium chloride infusion, PRN  potassium chloride (KLOR-CON M) extended release tablet 40 mEq, PRN   Or  potassium bicarb-citric acid (EFFER-K) effervescent tablet 40 mEq, PRN   Or  potassium chloride 10 mEq/100 mL IVPB (Peripheral Line), PRN  magnesium sulfate 2000 mg in 50 mL IVPB premix, PRN  enoxaparin (LOVENOX) injection 40 mg, Daily  ondansetron (ZOFRAN-ODT) disintegrating tablet 4 mg, Q8H PRN   Or  ondansetron (ZOFRAN) injection 4 mg, Q6H PRN  polyethylene glycol (GLYCOLAX) packet 17 g, Daily PRN  acetaminophen (TYLENOL) tablet 650 mg, Q6H PRN   Or  acetaminophen (TYLENOL) suppository 650 mg, Q6H PRN  thiamine (B-1) injection 100 mg, Daily  multivitamin 1 tablet, Daily  LORazepam (ATIVAN) tablet 1 mg, Q1H PRN   Or  LORazepam (ATIVAN) 1 mg in sodium chloride (PF) 0.9 % 10 mL injection, Q1H PRN   Or  LORazepam (ATIVAN) tablet 2 mg, Q1H PRN   Or  LORazepam (ATIVAN) 2 mg in sodium chloride (PF) 0.9 % 10 mL injection, Q1H PRN   Or  LORazepam (ATIVAN) tablet 3 mg, Q1H PRN   Or  LORazepam (ATIVAN) 3 mg in sodium chloride (PF) 0.9 % 10 mL injection, Q1H PRN   Or  LORazepam (ATIVAN) tablet 4 mg, Q1H PRN   Or  LORazepam (ATIVAN) 4 mg in sodium chloride (PF) 0.9 % 10 mL injection, Q1H PRN        Objective:  BP

## 2024-06-10 NOTE — PLAN OF CARE
Problem: Discharge Planning  Goal: Discharge to home or other facility with appropriate resources  Outcome: Progressing  Flowsheets (Taken 6/9/2024 2043)  Discharge to home or other facility with appropriate resources: Identify discharge learning needs (meds, wound care, etc)     Problem: Pain  Goal: Verbalizes/displays adequate comfort level or baseline comfort level  Outcome: Progressing  Flowsheets (Taken 6/9/2024 1730 by Shannon Brian, RN)  Verbalizes/displays adequate comfort level or baseline comfort level: Encourage patient to monitor pain and request assistance

## 2024-06-10 NOTE — CARE COORDINATION
CM reviewed chart, Pt is self pay status. Pt is Palestinian speaking. CM to see Pt to provided SA abuse resources, Set up [facility] appointment if pt agreeable. Pt will need meds to beds at discharge.     Electronically signed by Carolyn Morrison on 6/10/2024 at 8:53 AM   
Periumbilical abdominal pain [R10.33]  Transaminitis [R74.01]  Alcohol withdrawal delirium, acute, hyperactive (HCC) [F10.931]  Alcohol withdrawal syndrome with perceptual disturbance (HCC) [F10.932]    IF APPLICABLE: The Patient and/or patient representative Troy and his family were provided with a choice of provider and agrees with the discharge plan. Freedom of choice list with basic dialogue that supports the patient's individualized plan of care/goals and shares the quality data associated with the providers was provided to:     Patient Representative Name:       The Patient and/or Patient Representative Agree with the Discharge Plan?      Carolyn Morrison  Case Management Department  Ph: [phone] Fax: [phone]

## 2024-06-10 NOTE — PLAN OF CARE
Problem: Discharge Planning  Goal: Discharge to home or other facility with appropriate resources  6/10/2024 0904 by Shannon Brian, RN  Outcome: Progressing  6/10/2024 0514 by Rudy Wilson, RN  Outcome: Progressing  Flowsheets (Taken 6/9/2024 2043)  Discharge to home or other facility with appropriate resources: Identify discharge learning needs (meds, wound care, etc)     Problem: Pain  Goal: Verbalizes/displays adequate comfort level or baseline comfort level  6/10/2024 0904 by Shannon Brian, RN  Outcome: Progressing  6/10/2024 0514 by Rudy Wilson, RN  Outcome: Progressing  Flowsheets (Taken 6/9/2024 1730 by Shannon Brian, RN)  Verbalizes/displays adequate comfort level or baseline comfort level: Encourage patient to monitor pain and request assistance

## 2024-06-11 LAB
ALBUMIN SERPL-MCNC: 3.4 G/DL (ref 3.4–5)
ALBUMIN/GLOB SERPL: 0.9 {RATIO} (ref 1.1–2.2)
ALP SERPL-CCNC: 242 U/L (ref 40–129)
ALT SERPL-CCNC: 79 U/L (ref 10–40)
ANION GAP SERPL CALCULATED.3IONS-SCNC: 10 MMOL/L (ref 3–16)
AST SERPL-CCNC: 145 U/L (ref 15–37)
BASOPHILS # BLD: 0 K/UL (ref 0–0.2)
BASOPHILS NFR BLD: 1.3 %
BILIRUB SERPL-MCNC: 0.6 MG/DL (ref 0–1)
BUN SERPL-MCNC: 7 MG/DL (ref 7–20)
CALCIUM SERPL-MCNC: 9 MG/DL (ref 8.3–10.6)
CHLORIDE SERPL-SCNC: 103 MMOL/L (ref 99–110)
CO2 SERPL-SCNC: 24 MMOL/L (ref 21–32)
CREAT SERPL-MCNC: 0.7 MG/DL (ref 0.9–1.3)
DEPRECATED RDW RBC AUTO: 15.5 % (ref 12.4–15.4)
EOSINOPHIL # BLD: 0.2 K/UL (ref 0–0.6)
EOSINOPHIL NFR BLD: 5.8 %
GFR SERPLBLD CREATININE-BSD FMLA CKD-EPI: >90 ML/MIN/{1.73_M2}
GLUCOSE BLD-MCNC: 112 MG/DL (ref 70–99)
GLUCOSE SERPL-MCNC: 112 MG/DL (ref 70–99)
HCT VFR BLD AUTO: 35.4 % (ref 40.5–52.5)
HGB BLD-MCNC: 11.9 G/DL (ref 13.5–17.5)
LYMPHOCYTES # BLD: 0.9 K/UL (ref 1–5.1)
LYMPHOCYTES NFR BLD: 28.9 %
MCH RBC QN AUTO: 33.6 PG (ref 26–34)
MCHC RBC AUTO-ENTMCNC: 33.5 G/DL (ref 31–36)
MCV RBC AUTO: 100.4 FL (ref 80–100)
MONOCYTES # BLD: 0.3 K/UL (ref 0–1.3)
MONOCYTES NFR BLD: 10.3 %
NEUTROPHILS # BLD: 1.7 K/UL (ref 1.7–7.7)
NEUTROPHILS NFR BLD: 53.7 %
PERFORMED ON: ABNORMAL
PLATELET # BLD AUTO: 80 K/UL (ref 135–450)
PMV BLD AUTO: 9.2 FL (ref 5–10.5)
POTASSIUM SERPL-SCNC: 4 MMOL/L (ref 3.5–5.1)
PROT SERPL-MCNC: 7.2 G/DL (ref 6.4–8.2)
RBC # BLD AUTO: 3.53 M/UL (ref 4.2–5.9)
SODIUM SERPL-SCNC: 137 MMOL/L (ref 136–145)
WBC # BLD AUTO: 3.2 K/UL (ref 4–11)

## 2024-06-11 PROCEDURE — 80053 COMPREHEN METABOLIC PANEL: CPT

## 2024-06-11 PROCEDURE — 36415 COLL VENOUS BLD VENIPUNCTURE: CPT

## 2024-06-11 PROCEDURE — 85025 COMPLETE CBC W/AUTO DIFF WBC: CPT

## 2024-06-11 PROCEDURE — 6360000002 HC RX W HCPCS: Performed by: NURSE PRACTITIONER

## 2024-06-11 PROCEDURE — 2580000003 HC RX 258: Performed by: NURSE PRACTITIONER

## 2024-06-11 PROCEDURE — 2060000000 HC ICU INTERMEDIATE R&B

## 2024-06-11 PROCEDURE — 6370000000 HC RX 637 (ALT 250 FOR IP): Performed by: NURSE PRACTITIONER

## 2024-06-11 RX ORDER — AMLODIPINE BESYLATE 5 MG/1
5 TABLET ORAL DAILY
Qty: 90 TABLET | Refills: 0 | Status: SHIPPED | OUTPATIENT
Start: 2024-06-11 | End: 2024-09-09

## 2024-06-11 RX ORDER — GAUZE BANDAGE 2" X 2"
100 BANDAGE TOPICAL DAILY
Status: DISCONTINUED | OUTPATIENT
Start: 2024-06-12 | End: 2024-06-12 | Stop reason: HOSPADM

## 2024-06-11 RX ADMIN — SODIUM CHLORIDE, PRESERVATIVE FREE 10 ML: 5 INJECTION INTRAVENOUS at 21:17

## 2024-06-11 RX ADMIN — CHLORDIAZEPOXIDE HYDROCHLORIDE 10 MG: 10 CAPSULE ORAL at 21:15

## 2024-06-11 RX ADMIN — AMLODIPINE BESYLATE 5 MG: 5 TABLET ORAL at 08:10

## 2024-06-11 RX ADMIN — FAMOTIDINE 20 MG: 20 TABLET, FILM COATED ORAL at 21:15

## 2024-06-11 RX ADMIN — FAMOTIDINE 20 MG: 20 TABLET, FILM COATED ORAL at 08:10

## 2024-06-11 RX ADMIN — LEVETIRACETAM 500 MG: 500 TABLET, FILM COATED ORAL at 21:15

## 2024-06-11 RX ADMIN — LEVETIRACETAM 500 MG: 500 TABLET, FILM COATED ORAL at 08:10

## 2024-06-11 RX ADMIN — ENOXAPARIN SODIUM 40 MG: 100 INJECTION SUBCUTANEOUS at 08:12

## 2024-06-11 RX ADMIN — SODIUM CHLORIDE, PRESERVATIVE FREE 10 ML: 5 INJECTION INTRAVENOUS at 08:12

## 2024-06-11 RX ADMIN — THIAMINE HYDROCHLORIDE 100 MG: 100 INJECTION, SOLUTION INTRAMUSCULAR; INTRAVENOUS at 08:10

## 2024-06-11 RX ADMIN — CHLORDIAZEPOXIDE HYDROCHLORIDE 10 MG: 10 CAPSULE ORAL at 08:10

## 2024-06-11 RX ADMIN — THERA TABS 1 TABLET: TAB at 08:10

## 2024-06-11 ASSESSMENT — PAIN SCALES - GENERAL
PAINLEVEL_OUTOF10: 0

## 2024-06-11 NOTE — DISCHARGE SUMMARY
Hospital Medicine Discharge Summary    Patient: Troy Benitez     Gender: male  : 1975   Age: 48 y.o.  MRN: [id number]    Admitting Physician: Maday Dietz MD  Discharge Physician: Chalo Juárez MD     Code Status: Full Code     Admit Date: 2024   Discharge Date:   2024    Disposition:  Home  Time spent arranging discharge: 31 minutes    Discharge Diagnoses:    Active Hospital Problems    Diagnosis Date Noted    Alcohol withdrawal delirium, acute, hyperactive (HCC) [F10.931] 2024         Condition at Discharge:  Stable    Hospital Course:   Admitted to the hospital with alcohol withdrawal treated with Ativan and Librium this improved transaminases improving patient pancytopenia secondary to this would recheck CMP and CBC on next office visit to ensure labs are improving  Discharge Exam:    /86   Pulse 72   Temp 97.9 °F (36.6 °C) (Oral)   Resp 16   Wt 62 kg (136 lb 11.2 oz)   SpO2 97%   BMI 25.83 kg/m²   General appearance:  Appears comfortable. AAOx3  HEENT: atraumatic, Pupils equal, muscous membranes moist, no masses appreciated  Cardiovascular: Regular rate and rhythm no murmurs appreciated  Respiratory: CTAB no wheezing  Gastrointestinal: Abdomen soft, non-tender, BS+  EXT: no edema  Neurology: no gross focal deficts  Psychiatry: Appropriate affect. Not agitated  Skin: Warm, dry, no rashes appreciated    Discharge Medications:   Current Discharge Medication List        Current Discharge Medication List        Current Discharge Medication List        CONTINUE these medications which have NOT CHANGED    Details   amLODIPine (NORVASC) 5 MG tablet Take 1 tablet by mouth daily  Qty: 90 tablet, Refills: 0      thiamine mononitrate (THIAMINE) 100 MG tablet Take 1 tablet by mouth daily  Qty: 90 tablet, Refills: 0      dicyclomine (BENTYL) 10 MG capsule Take 1 capsule by mouth 4 times daily  Qty: 30 capsule, Refills: 0      famotidine (PEPCID) 20 MG tablet Take 1 tablet by

## 2024-06-11 NOTE — PROGRESS NOTES
Discharge instructions and AVS reviewed with patient via . Pt states he is out of almost all of his home medications. RN notified provider and . Medications ordered, outpatient pharmacy to be delivering medications to pt's bedside. Pt agreeable to current plan of care.

## 2024-06-11 NOTE — PROGRESS NOTES
Pt only got amlodipine ordered to be filled here. Pt is missing almost all home meds, only had 1 amlodipine pill left. RN notified provider, outpatient pharmacy closed at this time. Pt agreeable to staying overnight until meds can be delivered tomorrow morning.

## 2024-06-11 NOTE — PLAN OF CARE
Problem: Discharge Planning  Goal: Discharge to home or other facility with appropriate resources  6/11/2024 1037 by Annie Sow RN  Outcome: Progressing  Flowsheets (Taken 6/11/2024 0810)  Discharge to home or other facility with appropriate resources: Refer to discharge planning if patient needs post-hospital services based on physician order or complex needs related to functional status, cognitive ability or social support system  6/11/2024 0416 by Rudy Wilson, RN  Outcome: Progressing  Flowsheets (Taken 6/10/2024 2146)  Discharge to home or other facility with appropriate resources: Identify discharge learning needs (meds, wound care, etc)     Problem: Pain  Goal: Verbalizes/displays adequate comfort level or baseline comfort level  6/11/2024 1037 by Annie Sow RN  Outcome: Progressing  6/11/2024 0416 by Rudy Wilson, RN  Outcome: Progressing

## 2024-06-11 NOTE — PLAN OF CARE
Problem: Discharge Planning  Goal: Discharge to home or other facility with appropriate resources  Outcome: Progressing  Flowsheets (Taken 6/10/2024 2146)  Discharge to home or other facility with appropriate resources: Identify discharge learning needs (meds, wound care, etc)     Problem: Pain  Goal: Verbalizes/displays adequate comfort level or baseline comfort level  Outcome: Progressing

## 2024-06-11 NOTE — DISCHARGE INSTR - DIET

## 2024-06-11 NOTE — PROGRESS NOTES
CLINICAL PHARMACY NOTE: MEDS TO BEDS    Total # of Prescriptions Filled: 1   The following medications were delivered to the patient:  Amlodipine 5mg    Additional Documentation:     [employer] approved medication delivery    Medication was delivered to patient's room and patient signed for    Lucia Wilhelm CPhT

## 2024-06-11 NOTE — PROGRESS NOTES
Pt A&Ox 4 on RA. AM assessment and vitals completed and put into flowsheets. AM medications given with no s/s of aspiration. Pt with no questions or concerns voiced to RN at this time. Fall precautions in place and call light within reach.

## 2024-06-12 VITALS
OXYGEN SATURATION: 99 % | DIASTOLIC BLOOD PRESSURE: 85 MMHG | SYSTOLIC BLOOD PRESSURE: 122 MMHG | RESPIRATION RATE: 18 BRPM | BODY MASS INDEX: 25.83 KG/M2 | WEIGHT: 136.7 LBS | HEART RATE: 83 BPM | TEMPERATURE: 97.4 F

## 2024-06-12 LAB
ALBUMIN SERPL-MCNC: 3.3 G/DL (ref 3.4–5)
ALBUMIN/GLOB SERPL: 0.8 {RATIO} (ref 1.1–2.2)
ALP SERPL-CCNC: 243 U/L (ref 40–129)
ALT SERPL-CCNC: 71 U/L (ref 10–40)
ANION GAP SERPL CALCULATED.3IONS-SCNC: 10 MMOL/L (ref 3–16)
AST SERPL-CCNC: 101 U/L (ref 15–37)
BILIRUB SERPL-MCNC: 0.4 MG/DL (ref 0–1)
BUN SERPL-MCNC: 8 MG/DL (ref 7–20)
CALCIUM SERPL-MCNC: 9.1 MG/DL (ref 8.3–10.6)
CHLORIDE SERPL-SCNC: 102 MMOL/L (ref 99–110)
CO2 SERPL-SCNC: 24 MMOL/L (ref 21–32)
CREAT SERPL-MCNC: 0.8 MG/DL (ref 0.9–1.3)
GFR SERPLBLD CREATININE-BSD FMLA CKD-EPI: >90 ML/MIN/{1.73_M2}
GLUCOSE SERPL-MCNC: 117 MG/DL (ref 70–99)
POTASSIUM SERPL-SCNC: 3.8 MMOL/L (ref 3.5–5.1)
PROT SERPL-MCNC: 7.3 G/DL (ref 6.4–8.2)
SODIUM SERPL-SCNC: 136 MMOL/L (ref 136–145)

## 2024-06-12 PROCEDURE — 6370000000 HC RX 637 (ALT 250 FOR IP): Performed by: NURSE PRACTITIONER

## 2024-06-12 PROCEDURE — 36415 COLL VENOUS BLD VENIPUNCTURE: CPT

## 2024-06-12 PROCEDURE — 80053 COMPREHEN METABOLIC PANEL: CPT

## 2024-06-12 PROCEDURE — 6370000000 HC RX 637 (ALT 250 FOR IP): Performed by: INTERNAL MEDICINE

## 2024-06-12 PROCEDURE — 2580000003 HC RX 258: Performed by: NURSE PRACTITIONER

## 2024-06-12 RX ORDER — LEVETIRACETAM 500 MG/1
500 TABLET ORAL 2 TIMES DAILY
Qty: 60 TABLET | Refills: 3 | Status: SHIPPED | OUTPATIENT
Start: 2024-06-12

## 2024-06-12 RX ORDER — DICYCLOMINE HYDROCHLORIDE 10 MG/1
10 CAPSULE ORAL 4 TIMES DAILY
Qty: 30 CAPSULE | Refills: 0 | Status: SHIPPED | OUTPATIENT
Start: 2024-06-12

## 2024-06-12 RX ORDER — THIAMINE MONONITRATE (VIT B1) 100 MG
100 TABLET ORAL DAILY
Qty: 39 TABLET | Refills: 0 | Status: SHIPPED | OUTPATIENT
Start: 2024-06-12 | End: 2024-06-12

## 2024-06-12 RX ORDER — ONDANSETRON 4 MG/1
4-8 TABLET, ORALLY DISINTEGRATING ORAL EVERY 12 HOURS PRN
Qty: 20 TABLET | Refills: 0 | Status: SHIPPED | OUTPATIENT
Start: 2024-06-12

## 2024-06-12 RX ORDER — FAMOTIDINE 20 MG/1
20 TABLET, FILM COATED ORAL 2 TIMES DAILY
Qty: 60 TABLET | Refills: 0 | Status: SHIPPED | OUTPATIENT
Start: 2024-06-12

## 2024-06-12 RX ORDER — THIAMINE MONONITRATE (VIT B1) 100 MG
100 TABLET ORAL DAILY
Qty: 30 TABLET | Refills: 0 | Status: SHIPPED | OUTPATIENT
Start: 2024-06-12 | End: 2024-07-21

## 2024-06-12 RX ADMIN — SODIUM CHLORIDE, PRESERVATIVE FREE 10 ML: 5 INJECTION INTRAVENOUS at 08:15

## 2024-06-12 RX ADMIN — LEVETIRACETAM 500 MG: 500 TABLET, FILM COATED ORAL at 08:14

## 2024-06-12 RX ADMIN — CHLORDIAZEPOXIDE HYDROCHLORIDE 10 MG: 10 CAPSULE ORAL at 08:14

## 2024-06-12 RX ADMIN — FAMOTIDINE 20 MG: 20 TABLET, FILM COATED ORAL at 08:14

## 2024-06-12 RX ADMIN — Medication 100 MG: at 08:14

## 2024-06-12 RX ADMIN — THERA TABS 1 TABLET: TAB at 08:14

## 2024-06-12 RX ADMIN — AMLODIPINE BESYLATE 5 MG: 5 TABLET ORAL at 08:14

## 2024-06-12 ASSESSMENT — PAIN SCALES - GENERAL: PAINLEVEL_OUTOF10: 0

## 2024-06-12 NOTE — PROGRESS NOTES
[facility]    6/12/2024 11:44 AM        Name: Troy Benitez .              Admitted: 6/8/2024  Primary Care Provider: Frank Taylor MD (Tel: [phone])        Subjective:    Lying bed no nausea vomiting chest    Reviewed interval ancillary notes    Current Medications  thiamine mononitrate tablet 100 mg, Daily  chlordiazePOXIDE (LIBRIUM) capsule 10 mg, BID  amLODIPine (NORVASC) tablet 5 mg, Daily  dicyclomine (BENTYL) capsule 10 mg, 4x Daily PRN  famotidine (PEPCID) tablet 20 mg, BID  levETIRAcetam (KEPPRA) tablet 500 mg, BID  sodium chloride flush 0.9 % injection 5-40 mL, 2 times per day  sodium chloride flush 0.9 % injection 5-40 mL, PRN  0.9 % sodium chloride infusion, PRN  potassium chloride (KLOR-CON M) extended release tablet 40 mEq, PRN   Or  potassium bicarb-citric acid (EFFER-K) effervescent tablet 40 mEq, PRN   Or  potassium chloride 10 mEq/100 mL IVPB (Peripheral Line), PRN  magnesium sulfate 2000 mg in 50 mL IVPB premix, PRN  enoxaparin (LOVENOX) injection 40 mg, Daily  ondansetron (ZOFRAN-ODT) disintegrating tablet 4 mg, Q8H PRN   Or  ondansetron (ZOFRAN) injection 4 mg, Q6H PRN  polyethylene glycol (GLYCOLAX) packet 17 g, Daily PRN  acetaminophen (TYLENOL) tablet 650 mg, Q6H PRN   Or  acetaminophen (TYLENOL) suppository 650 mg, Q6H PRN  multivitamin 1 tablet, Daily  LORazepam (ATIVAN) tablet 1 mg, Q1H PRN   Or  LORazepam (ATIVAN) 1 mg in sodium chloride (PF) 0.9 % 10 mL injection, Q1H PRN   Or  LORazepam (ATIVAN) tablet 2 mg, Q1H PRN   Or  LORazepam (ATIVAN) 2 mg in sodium chloride (PF) 0.9 % 10 mL injection, Q1H PRN   Or  LORazepam (ATIVAN) tablet 3 mg, Q1H PRN   Or  LORazepam (ATIVAN) 3 mg in sodium chloride (PF) 0.9 % 10 mL injection, Q1H PRN   Or  LORazepam (ATIVAN) tablet 4 mg, Q1H PRN   Or  LORazepam (ATIVAN) 4 mg in sodium chloride (PF) 0.9 % 10 mL injection, Q1H PRN        Objective:  /85

## 2024-06-12 NOTE — PROGRESS NOTES
CLINICAL PHARMACY NOTE: MEDS TO BEDS    Total # of Prescriptions Filled: 5   The following medications were delivered to the patient:  THIAMINE HCL 100MG TABS  DICYCLOMINE HCL 10MG CAPS  FAMOTIDINE 20MG CAPS  LEVETIRACETAM 500MG TABS  ONDANSETRON 4MG ODT    Additional Documentation: [employer] approved to deliver medications to patient room, patient unable to sign  [facility]

## 2024-06-12 NOTE — PROGRESS NOTES
Data- discharge order received, pt verbalized agreement to discharge, disposition to previous residence, no needs for HHC/DME.     Action- discharge instructions prepared and given to patient, pt verbalized understanding. Medication information packet given r/t NEW and/or CHANGED prescriptions emphasizing name/purpose/side effects, pt verbalized understanding. Discharge instruction summary: Diet- general, Activity- as tolerated, Primary Care Physician as follows: Frank Taylor -[phone] f/u appointment within 2 weeks, prescription medications filled [facility].        1. WEIGHT: Admit Weight - Scale: 59.4 kg (131 lb) (06/08/24 1532)        Today  Weight - Scale: 62 kg (136 lb 11.2 oz) (06/11/24 0844)       2. O2 SAT.: SpO2: 99 % (06/12/24 0800)    Response- Pt belongings gathered, IV removed. Disposition is home (no HHC/DME needs), transported with personal belongings, taken to lobby via w/c, no complications.

## (undated) DEVICE — MOUTHPIECE ENDOSCP L CTRL OPN AND SIDE PORTS DISP

## (undated) DEVICE — PROCEDURE KIT ENDOSCP CUST

## (undated) DEVICE — SET VLV 3 PC AWS DISPOSABLE GRDIAN SCOPEVALET

## (undated) DEVICE — BW-412T DISP COMBO CLEANING BRUSH: Brand: SINGLE USE COMBINATION CLEANING BRUSH

## (undated) DEVICE — FORCEPS BX L240CM WRK CHN 2.8MM STD CAP W/ NDL MIC MESH

## (undated) DEVICE — SOLUTION IV IRRIG WATER 500ML POUR BRL ST 2F7113

## (undated) DEVICE — GOWN AURORA NONREINF LG: Brand: MEDLINE INDUSTRIES, INC.

## (undated) DEVICE — Device